# Patient Record
Sex: FEMALE | Race: WHITE | NOT HISPANIC OR LATINO
[De-identification: names, ages, dates, MRNs, and addresses within clinical notes are randomized per-mention and may not be internally consistent; named-entity substitution may affect disease eponyms.]

---

## 2017-01-03 ENCOUNTER — APPOINTMENT (OUTPATIENT)
Dept: PULMONOLOGY | Facility: CLINIC | Age: 72
End: 2017-01-03

## 2017-01-03 VITALS
WEIGHT: 218 LBS | OXYGEN SATURATION: 94 % | BODY MASS INDEX: 39.87 KG/M2 | SYSTOLIC BLOOD PRESSURE: 128 MMHG | DIASTOLIC BLOOD PRESSURE: 78 MMHG | HEART RATE: 76 BPM

## 2017-01-03 VITALS — RESPIRATION RATE: 16 BRPM

## 2017-04-04 ENCOUNTER — APPOINTMENT (OUTPATIENT)
Dept: PULMONOLOGY | Facility: CLINIC | Age: 72
End: 2017-04-04

## 2017-04-04 VITALS — DIASTOLIC BLOOD PRESSURE: 80 MMHG | OXYGEN SATURATION: 94 % | SYSTOLIC BLOOD PRESSURE: 128 MMHG | HEART RATE: 95 BPM

## 2017-04-04 VITALS — BODY MASS INDEX: 38.78 KG/M2 | WEIGHT: 212 LBS

## 2017-07-19 ENCOUNTER — APPOINTMENT (OUTPATIENT)
Dept: PULMONOLOGY | Facility: CLINIC | Age: 72
End: 2017-07-19

## 2017-07-19 VITALS — WEIGHT: 212 LBS | BODY MASS INDEX: 38.78 KG/M2

## 2017-07-19 VITALS — RESPIRATION RATE: 16 BRPM

## 2017-07-19 DIAGNOSIS — G47.34 IDIOPATHIC SLEEP RELATED NONOBSTRUCTIVE ALVEOLAR HYPOVENTILATION: ICD-10-CM

## 2017-10-25 ENCOUNTER — APPOINTMENT (OUTPATIENT)
Dept: PULMONOLOGY | Facility: CLINIC | Age: 72
End: 2017-10-25
Payer: MEDICARE

## 2017-10-25 ENCOUNTER — MED ADMIN CHARGE (OUTPATIENT)
Age: 72
End: 2017-10-25

## 2017-10-25 VITALS
WEIGHT: 221 LBS | BODY MASS INDEX: 40.42 KG/M2 | HEART RATE: 91 BPM | SYSTOLIC BLOOD PRESSURE: 118 MMHG | DIASTOLIC BLOOD PRESSURE: 60 MMHG | OXYGEN SATURATION: 92 %

## 2017-10-25 VITALS — RESPIRATION RATE: 16 BRPM

## 2017-10-25 DIAGNOSIS — Z23 ENCOUNTER FOR IMMUNIZATION: ICD-10-CM

## 2017-10-25 PROCEDURE — 90688 IIV4 VACCINE SPLT 0.5 ML IM: CPT

## 2017-10-25 PROCEDURE — 99214 OFFICE O/P EST MOD 30 MIN: CPT | Mod: 25

## 2017-10-25 PROCEDURE — G0008: CPT

## 2018-04-03 ENCOUNTER — APPOINTMENT (OUTPATIENT)
Dept: PULMONOLOGY | Facility: CLINIC | Age: 73
End: 2018-04-03
Payer: MEDICARE

## 2018-04-03 VITALS — BODY MASS INDEX: 40.42 KG/M2 | WEIGHT: 221 LBS | SYSTOLIC BLOOD PRESSURE: 144 MMHG | DIASTOLIC BLOOD PRESSURE: 82 MMHG

## 2018-04-03 VITALS — RESPIRATION RATE: 16 BRPM

## 2018-04-03 VITALS — HEART RATE: 67 BPM | OXYGEN SATURATION: 94 %

## 2018-04-03 PROCEDURE — 99214 OFFICE O/P EST MOD 30 MIN: CPT

## 2018-04-03 RX ORDER — UMECLIDINIUM BROMIDE AND VILANTEROL TRIFENATATE 62.5; 25 UG/1; UG/1
62.5-25 POWDER RESPIRATORY (INHALATION) DAILY
Qty: 1 | Refills: 5 | Status: DISCONTINUED | COMMUNITY
Start: 2017-04-04 | End: 2018-04-03

## 2018-04-03 RX ORDER — CLONAZEPAM 0.5 MG/1
0.5 TABLET ORAL
Refills: 0 | Status: ACTIVE | COMMUNITY

## 2018-04-03 RX ORDER — ALBUTEROL SULFATE 90 UG/1
108 (90 BASE) AEROSOL, METERED RESPIRATORY (INHALATION)
Qty: 1 | Refills: 4 | Status: DISCONTINUED | COMMUNITY
Start: 2017-07-19 | End: 2018-04-03

## 2018-06-08 ENCOUNTER — APPOINTMENT (OUTPATIENT)
Dept: PULMONOLOGY | Facility: CLINIC | Age: 73
End: 2018-06-08
Payer: MEDICARE

## 2018-06-08 VITALS — BODY MASS INDEX: 38.78 KG/M2 | WEIGHT: 212 LBS

## 2018-06-08 VITALS
DIASTOLIC BLOOD PRESSURE: 80 MMHG | HEART RATE: 74 BPM | SYSTOLIC BLOOD PRESSURE: 130 MMHG | OXYGEN SATURATION: 95 % | HEIGHT: 62 IN | BODY MASS INDEX: 38.78 KG/M2

## 2018-06-08 VITALS — RESPIRATION RATE: 16 BRPM

## 2018-06-08 PROCEDURE — 94664 DEMO&/EVAL PT USE INHALER: CPT | Mod: 59

## 2018-06-08 PROCEDURE — 94060 EVALUATION OF WHEEZING: CPT

## 2018-06-08 PROCEDURE — 99214 OFFICE O/P EST MOD 30 MIN: CPT | Mod: 25

## 2018-06-08 RX ORDER — METFORMIN ER 500 MG 500 MG/1
500 TABLET ORAL
Qty: 90 | Refills: 0 | Status: ACTIVE | COMMUNITY
Start: 2018-05-17

## 2018-06-08 RX ORDER — ATORVASTATIN CALCIUM 40 MG/1
40 TABLET, FILM COATED ORAL
Qty: 90 | Refills: 0 | Status: ACTIVE | COMMUNITY
Start: 2018-02-21

## 2018-06-12 ENCOUNTER — APPOINTMENT (OUTPATIENT)
Dept: PULMONOLOGY | Facility: CLINIC | Age: 73
End: 2018-06-12

## 2018-07-05 ENCOUNTER — APPOINTMENT (OUTPATIENT)
Dept: PULMONOLOGY | Facility: CLINIC | Age: 73
End: 2018-07-05

## 2018-07-05 RX ORDER — PREDNISOLONE ACETATE 10 MG/ML
1 SUSPENSION/ DROPS OPHTHALMIC
Qty: 5 | Refills: 0 | Status: DISCONTINUED | COMMUNITY
Start: 2018-05-21 | End: 2018-07-05

## 2018-07-05 RX ORDER — OFLOXACIN 3 MG/ML
0.3 SOLUTION/ DROPS OPHTHALMIC
Qty: 5 | Refills: 0 | Status: DISCONTINUED | COMMUNITY
Start: 2018-05-21 | End: 2018-07-05

## 2018-07-05 RX ORDER — BROMFENAC 0.76 MG/ML
0.07 SOLUTION/ DROPS OPHTHALMIC
Qty: 5 | Refills: 0 | Status: DISCONTINUED | COMMUNITY
Start: 2018-05-21 | End: 2018-07-05

## 2018-10-22 ENCOUNTER — APPOINTMENT (OUTPATIENT)
Dept: PULMONOLOGY | Facility: CLINIC | Age: 73
End: 2018-10-22
Payer: MEDICARE

## 2018-10-22 VITALS — BODY MASS INDEX: 39.14 KG/M2 | WEIGHT: 214 LBS

## 2018-10-22 VITALS — DIASTOLIC BLOOD PRESSURE: 80 MMHG | OXYGEN SATURATION: 98 % | SYSTOLIC BLOOD PRESSURE: 130 MMHG | HEART RATE: 70 BPM

## 2018-10-22 VITALS — RESPIRATION RATE: 16 BRPM

## 2018-10-22 PROCEDURE — 99214 OFFICE O/P EST MOD 30 MIN: CPT | Mod: 25

## 2018-10-22 PROCEDURE — 94010 BREATHING CAPACITY TEST: CPT

## 2019-04-25 ENCOUNTER — APPOINTMENT (OUTPATIENT)
Dept: PULMONOLOGY | Facility: CLINIC | Age: 74
End: 2019-04-25

## 2019-07-11 ENCOUNTER — APPOINTMENT (OUTPATIENT)
Dept: PULMONOLOGY | Facility: CLINIC | Age: 74
End: 2019-07-11
Payer: MEDICARE

## 2019-07-11 VITALS
BODY MASS INDEX: 37.68 KG/M2 | RESPIRATION RATE: 16 BRPM | HEART RATE: 81 BPM | WEIGHT: 206 LBS | OXYGEN SATURATION: 95 % | SYSTOLIC BLOOD PRESSURE: 128 MMHG | DIASTOLIC BLOOD PRESSURE: 80 MMHG

## 2019-07-11 PROCEDURE — 99214 OFFICE O/P EST MOD 30 MIN: CPT

## 2019-07-11 NOTE — REVIEW OF SYSTEMS
[Back Pain] : ~T back pain [Hypertension] : ~T hypertension [Diabetes] : diabetes mellitus [As Noted in HPI] : as noted in HPI [Negative] : Psychiatric

## 2019-10-14 NOTE — PHYSICAL EXAM
[Normal Conjunctiva] : the conjunctiva exhibited no abnormalities [Eyelids - No Xanthelasma] : the eyelids demonstrated no xanthelasmas [Low Lying Soft Palate] : low lying soft palate [Enlarged Base of the Tongue] : enlargement of the base of the tongue [Elongated Uvula] : elongated uvula [III] : III [Neck Appearance] : the appearance of the neck was normal [Neck Cervical Mass (___cm)] : no neck mass was observed [Jugular Venous Distention Increased] : there was no jugular-venous distention [Thyroid Diffuse Enlargement] : the thyroid was not enlarged [Thyroid Nodule] : there were no palpable thyroid nodules [Heart Rate And Rhythm] : heart rate and rhythm were normal [Heart Sounds] : normal S1 and S2 [Murmurs] : no murmurs present [Abdomen Soft] : soft [Abdomen Tenderness] : non-tender [Abdomen Mass (___ Cm)] : no abdominal mass palpated [Abnormal Walk] : normal gait [Gait - Sufficient For Exercise Testing] : the gait was sufficient for exercise testing [Nail Clubbing] : no clubbing of the fingernails [Cyanosis, Localized] : no localized cyanosis [Petechial Hemorrhages (___cm)] : no petechial hemorrhages [Deep Tendon Reflexes (DTR)] : deep tendon reflexes were 2+ and symmetric [Sensation] : the sensory exam was normal to light touch and pinprick [No Focal Deficits] : no focal deficits [Oriented To Time, Place, And Person] : oriented to person, place, and time [Impaired Insight] : insight and judgment were intact [Affect] : the affect was normal [Normal Rate] : the respiratory rate was normal [Normal Rhythm/Effort] : normal respiratory rhythm and effort [Decreased Breath Sounds] : breath sounds were decreased diffusely [Normal to Percussion] : the lungs were normal to percussion [Normal] : palpation of the chest was normal [Skin Color & Pigmentation] : normal skin color and pigmentation [Skin Turgor] : normal skin turgor [] : no rash [FreeTextEntry1] : morbidly obese

## 2019-10-14 NOTE — ASSESSMENT
[FreeTextEntry1] : Mild COPD mild sleep apnea. CPAP use encouraged. Anoro was prescribed. Followup in 6 months with spirometry. We will probably repeat sleep studies at that time to reassess her sleep apnea and need for CPAP

## 2019-10-14 NOTE — HISTORY OF PRESENT ILLNESS
[FreeTextEntry1] : Patient with mild sleep apnea and mild COPD. Continues to display poor insight. She wants to go back on Anoro. she has not been using her CPAP.

## 2019-12-28 ENCOUNTER — OUTPATIENT (OUTPATIENT)
Dept: OUTPATIENT SERVICES | Facility: HOSPITAL | Age: 74
LOS: 1 days | End: 2019-12-28
Payer: MEDICARE

## 2019-12-28 DIAGNOSIS — G47.33 OBSTRUCTIVE SLEEP APNEA (ADULT) (PEDIATRIC): ICD-10-CM

## 2019-12-28 PROCEDURE — 95810 POLYSOM 6/> YRS 4/> PARAM: CPT

## 2019-12-28 PROCEDURE — 95810 POLYSOM 6/> YRS 4/> PARAM: CPT | Mod: 26

## 2020-01-14 ENCOUNTER — APPOINTMENT (OUTPATIENT)
Dept: PULMONOLOGY | Facility: CLINIC | Age: 75
End: 2020-01-14

## 2020-01-29 ENCOUNTER — APPOINTMENT (OUTPATIENT)
Dept: PULMONOLOGY | Facility: CLINIC | Age: 75
End: 2020-01-29
Payer: MEDICARE

## 2020-01-29 VITALS — RESPIRATION RATE: 16 BRPM

## 2020-01-29 VITALS — HEIGHT: 62 IN | WEIGHT: 211 LBS | BODY MASS INDEX: 38.83 KG/M2

## 2020-01-29 VITALS — OXYGEN SATURATION: 95 % | HEART RATE: 96 BPM | SYSTOLIC BLOOD PRESSURE: 130 MMHG | DIASTOLIC BLOOD PRESSURE: 68 MMHG

## 2020-01-29 PROCEDURE — 99214 OFFICE O/P EST MOD 30 MIN: CPT

## 2020-01-29 RX ORDER — UMECLIDINIUM BROMIDE AND VILANTEROL TRIFENATATE 62.5; 25 UG/1; UG/1
62.5-25 POWDER RESPIRATORY (INHALATION)
Qty: 1 | Refills: 2 | Status: DISCONTINUED | COMMUNITY
Start: 2018-06-08 | End: 2020-01-29

## 2020-01-29 NOTE — REVIEW OF SYSTEMS
[Hypertension] : ~T hypertension [Back Pain] : ~T back pain [Diabetes] : diabetes mellitus [As Noted in HPI] : as noted in HPI [Negative] : Pulmonary Hypertension

## 2020-01-29 NOTE — HISTORY OF PRESENT ILLNESS
[TextBox_4] : Not taking Anoro because of the cost. Feels reasonably well in terms of her breathing. She came in to review her recent sleep study. She is willing to try CPAP again. [TextBox_100] : 12/19 [TextBox_108] : 29 [TextBox_112] : 0 [TextBox_116] : 90

## 2020-01-29 NOTE — PHYSICAL EXAM
[FreeTextEntry1] : morbidly obese [Normal Conjunctiva] : the conjunctiva exhibited no abnormalities [Eyelids - No Xanthelasma] : the eyelids demonstrated no xanthelasmas [Elongated Uvula] : elongated uvula [Low Lying Soft Palate] : low lying soft palate [III] : III [Enlarged Base of the Tongue] : enlargement of the base of the tongue [Neck Appearance] : the appearance of the neck was normal [Neck Cervical Mass (___cm)] : no neck mass was observed [Thyroid Nodule] : there were no palpable thyroid nodules [Thyroid Diffuse Enlargement] : the thyroid was not enlarged [Jugular Venous Distention Increased] : there was no jugular-venous distention [Heart Rate And Rhythm] : heart rate and rhythm were normal [Heart Sounds] : normal S1 and S2 [Abdomen Soft] : soft [Murmurs] : no murmurs present [Abdomen Tenderness] : non-tender [Abdomen Mass (___ Cm)] : no abdominal mass palpated [Abnormal Walk] : normal gait [Nail Clubbing] : no clubbing of the fingernails [Gait - Sufficient For Exercise Testing] : the gait was sufficient for exercise testing [Petechial Hemorrhages (___cm)] : no petechial hemorrhages [Deep Tendon Reflexes (DTR)] : deep tendon reflexes were 2+ and symmetric [Cyanosis, Localized] : no localized cyanosis [No Focal Deficits] : no focal deficits [Sensation] : the sensory exam was normal to light touch and pinprick [Oriented To Time, Place, And Person] : oriented to person, place, and time [Impaired Insight] : insight and judgment were intact [Affect] : the affect was normal [Decreased Breath Sounds] : breath sounds were decreased diffusely [Normal Rhythm/Effort] : normal respiratory rhythm and effort [Normal Rate] : the respiratory rate was normal [Normal to Percussion] : the lungs were normal to percussion [Normal] : palpation of the chest was normal [Skin Color & Pigmentation] : normal skin color and pigmentation [Skin Turgor] : normal skin turgor [] : no rash

## 2020-01-29 NOTE — ASSESSMENT
[FreeTextEntry1] : Patient has moderate obstructive sleep apnea in need of treatment.AutoPAP will be ordered for the patient at 6 to 12 cm H2O.  The patient was instructed to begin wearing it with the goal being all night, every night.  Minimum acceptable use parameters were discussed with the patient.  Followup will be in 2-3 months to evaluate compliance and efficacy, and address any problems the patient may have with APAP.\par \par Patient has moderate COPD and does not wish treatment at this time. She is relatively asymptomatic.\par

## 2020-04-02 ENCOUNTER — APPOINTMENT (OUTPATIENT)
Dept: PULMONOLOGY | Facility: CLINIC | Age: 75
End: 2020-04-02

## 2020-04-02 NOTE — HISTORY OF PRESENT ILLNESS
[Home] : at home, [unfilled] , at the time of the visit. [Medical Office: ([facility])___] : at ~his/her~ medical office located in [place] [Obstructive Sleep Apnea] : obstructive sleep apnea [APAP:] : APAP [TextBox_4] : Denies SOB [TextBox_100] : 12/19 [TextBox_108] : 29 [TextBox_112] : 99 [TextBox_116] : 90 [TextBox_127] : 3/20 [TextBox_129] : 3/20 [TextBox_133] : 100 [TextBox_137] : 100 [TextBox_141] : 6 [TextBox_143] : 26 [TextBox_147] : 2.1 [TextBox_165] : Feels much more alert since on CPAP

## 2020-05-13 ENCOUNTER — APPOINTMENT (OUTPATIENT)
Dept: PULMONOLOGY | Facility: CLINIC | Age: 75
End: 2020-05-13

## 2020-11-02 ENCOUNTER — APPOINTMENT (OUTPATIENT)
Dept: PULMONOLOGY | Facility: CLINIC | Age: 75
End: 2020-11-02
Payer: MEDICARE

## 2020-11-02 VITALS
DIASTOLIC BLOOD PRESSURE: 80 MMHG | HEART RATE: 110 BPM | OXYGEN SATURATION: 94 % | SYSTOLIC BLOOD PRESSURE: 140 MMHG | RESPIRATION RATE: 20 BRPM | WEIGHT: 220 LBS | BODY MASS INDEX: 40.24 KG/M2

## 2020-11-02 PROCEDURE — 99214 OFFICE O/P EST MOD 30 MIN: CPT

## 2020-11-02 RX ORDER — DOXYCYCLINE HYCLATE 100 MG/1
100 TABLET ORAL
Qty: 30 | Refills: 0 | Status: DISCONTINUED | COMMUNITY
Start: 2020-09-15

## 2020-11-02 RX ORDER — ENALAPRIL MALEATE 5 MG/1
5 TABLET ORAL
Qty: 180 | Refills: 0 | Status: DISCONTINUED | COMMUNITY
Start: 2020-09-15

## 2020-11-02 NOTE — PHYSICAL EXAM
[FreeTextEntry1] : morbidly obese [Normal Conjunctiva] : the conjunctiva exhibited no abnormalities [Eyelids - No Xanthelasma] : the eyelids demonstrated no xanthelasmas [Low Lying Soft Palate] : low lying soft palate [Elongated Uvula] : elongated uvula [Enlarged Base of the Tongue] : enlargement of the base of the tongue [III] : III [Neck Appearance] : the appearance of the neck was normal [Neck Cervical Mass (___cm)] : no neck mass was observed [Jugular Venous Distention Increased] : there was no jugular-venous distention [Thyroid Diffuse Enlargement] : the thyroid was not enlarged [Thyroid Nodule] : there were no palpable thyroid nodules [Heart Rate And Rhythm] : heart rate and rhythm were normal [Heart Sounds] : normal S1 and S2 [Murmurs] : no murmurs present [Abdomen Soft] : soft [Abdomen Tenderness] : non-tender [Abdomen Mass (___ Cm)] : no abdominal mass palpated [Abnormal Walk] : normal gait [Gait - Sufficient For Exercise Testing] : the gait was sufficient for exercise testing [Nail Clubbing] : no clubbing of the fingernails [Cyanosis, Localized] : no localized cyanosis [Petechial Hemorrhages (___cm)] : no petechial hemorrhages [Deep Tendon Reflexes (DTR)] : deep tendon reflexes were 2+ and symmetric [Sensation] : the sensory exam was normal to light touch and pinprick [No Focal Deficits] : no focal deficits [Oriented To Time, Place, And Person] : oriented to person, place, and time [Impaired Insight] : insight and judgment were intact [Affect] : the affect was normal [Normal Rate] : the respiratory rate was normal [Normal Rhythm/Effort] : normal respiratory rhythm and effort [Decreased Breath Sounds] : breath sounds were decreased diffusely [Normal to Percussion] : the lungs were normal to percussion [Normal] : palpation of the chest was normal [Skin Color & Pigmentation] : normal skin color and pigmentation [Skin Turgor] : normal skin turgor [] : no rash

## 2020-11-02 NOTE — HISTORY OF PRESENT ILLNESS
[TextBox_4] : Patient remains alert on CPAP. She's been complaining of increasing shortness of breath lately. She does not have any inhalers currently. Of note is that she's gained about 9 pounds since last visit. [Obstructive Sleep Apnea] : obstructive sleep apnea [APAP:] : APAP [TextBox_100] : 12/19 [TextBox_108] : 29 [TextBox_112] : 99 [TextBox_116] : 90 [TextBox_127] : 9/20 [TextBox_129] : 10/20 [TextBox_133] : 100 [TextBox_137] : 100 [TextBox_141] : 6 [TextBox_143] : 37 [TextBox_147] : 1.4 [TextBox_165] : Feels much more alert since on CPAP

## 2020-11-02 NOTE — ASSESSMENT
[FreeTextEntry1] : Sleep apnea with excellent compliance and benefit from CPAP.\par \par Patient with mild to moderate COPD. Probably worse due to weight gain but she should be on some chronic medication. Anoro was restarted and I gave her samples. Followup will be in 3 months.

## 2020-11-02 NOTE — REASON FOR VISIT
[Follow-Up] : a follow-up visit [Sleep Apnea] : sleep apnea [COPD] : COPD [Shortness of Breath] : shortness of breath

## 2021-02-05 ENCOUNTER — APPOINTMENT (OUTPATIENT)
Dept: PULMONOLOGY | Facility: CLINIC | Age: 76
End: 2021-02-05

## 2021-03-09 ENCOUNTER — APPOINTMENT (OUTPATIENT)
Dept: PULMONOLOGY | Facility: CLINIC | Age: 76
End: 2021-03-09
Payer: MEDICARE

## 2021-03-09 VITALS
OXYGEN SATURATION: 96 % | HEIGHT: 65 IN | WEIGHT: 220 LBS | HEART RATE: 83 BPM | DIASTOLIC BLOOD PRESSURE: 70 MMHG | BODY MASS INDEX: 36.65 KG/M2 | SYSTOLIC BLOOD PRESSURE: 118 MMHG | RESPIRATION RATE: 15 BRPM

## 2021-03-09 PROCEDURE — 99214 OFFICE O/P EST MOD 30 MIN: CPT

## 2021-03-09 NOTE — HISTORY OF PRESENT ILLNESS
[TextBox_4] : Patient feels somewhat better with Anoro in terms of shortness of breath but feels that she could use a rescue inhaler. [Obstructive Sleep Apnea] : obstructive sleep apnea [APAP:] : APAP [TextBox_100] : 12/19 [TextBox_108] : 29 [TextBox_112] : 99 [TextBox_116] : 90 [TextBox_127] : 2/21 [TextBox_129] : 3/21 [TextBox_133] : 100 [TextBox_137] : 100 [TextBox_141] : 6 [TextBox_143] : 41 [TextBox_147] : 1.6 [TextBox_165] : Feels much more alert since on CPAP

## 2021-03-09 NOTE — ASSESSMENT
[FreeTextEntry1] : COPD doing better. I ordered a rescue inhaler for her but I told her that he should only be used if she feels wheezy or congested.\par \par Sleep apnea with excellent compliance and benefit from CPAP. CPAP will be continued.\par \par Followup in 6 months.

## 2021-06-24 ENCOUNTER — RX RENEWAL (OUTPATIENT)
Age: 76
End: 2021-06-24

## 2021-09-09 ENCOUNTER — APPOINTMENT (OUTPATIENT)
Dept: PULMONOLOGY | Facility: CLINIC | Age: 76
End: 2021-09-09
Payer: MEDICARE

## 2021-09-09 VITALS
HEART RATE: 72 BPM | RESPIRATION RATE: 16 BRPM | OXYGEN SATURATION: 96 % | DIASTOLIC BLOOD PRESSURE: 78 MMHG | WEIGHT: 222 LBS | SYSTOLIC BLOOD PRESSURE: 122 MMHG | BODY MASS INDEX: 36.94 KG/M2

## 2021-09-09 PROCEDURE — 99214 OFFICE O/P EST MOD 30 MIN: CPT

## 2021-09-09 NOTE — HISTORY OF PRESENT ILLNESS
[TextBox_4] : Breathing better since going on Anoro. [Obstructive Sleep Apnea] : obstructive sleep apnea [APAP:] : APAP [TextBox_100] : 12/19 [TextBox_108] : 29 [TextBox_112] : 99 [TextBox_116] : 90 [TextBox_127] : 8/21 [TextBox_129] : 9/21 [TextBox_133] : 100 [TextBox_137] : 100 [TextBox_141] : 6 [TextBox_143] : 46 [TextBox_147] : 1.6 [TextBox_165] : Feels much more alert since on CPAP

## 2021-09-09 NOTE — PHYSICAL EXAM
[Normal Conjunctiva] : the conjunctiva exhibited no abnormalities [FreeTextEntry1] : morbidly obese [Eyelids - No Xanthelasma] : the eyelids demonstrated no xanthelasmas [Low Lying Soft Palate] : low lying soft palate [Elongated Uvula] : elongated uvula [Enlarged Base of the Tongue] : enlargement of the base of the tongue [III] : III [Neck Appearance] : the appearance of the neck was normal [Neck Cervical Mass (___cm)] : no neck mass was observed [Jugular Venous Distention Increased] : there was no jugular-venous distention [Thyroid Diffuse Enlargement] : the thyroid was not enlarged [Thyroid Nodule] : there were no palpable thyroid nodules [Heart Rate And Rhythm] : heart rate and rhythm were normal [Heart Sounds] : normal S1 and S2 [Murmurs] : no murmurs present [Abdomen Soft] : soft [Abdomen Tenderness] : non-tender [Abdomen Mass (___ Cm)] : no abdominal mass palpated [Abnormal Walk] : normal gait [Gait - Sufficient For Exercise Testing] : the gait was sufficient for exercise testing [Nail Clubbing] : no clubbing of the fingernails [Cyanosis, Localized] : no localized cyanosis [Petechial Hemorrhages (___cm)] : no petechial hemorrhages [Deep Tendon Reflexes (DTR)] : deep tendon reflexes were 2+ and symmetric [Sensation] : the sensory exam was normal to light touch and pinprick [Oriented To Time, Place, And Person] : oriented to person, place, and time [No Focal Deficits] : no focal deficits [Impaired Insight] : insight and judgment were intact [Affect] : the affect was normal [Normal Rate] : the respiratory rate was normal [Normal Rhythm/Effort] : normal respiratory rhythm and effort [Decreased Breath Sounds] : breath sounds were decreased diffusely [Normal to Percussion] : the lungs were normal to percussion [Normal] : palpation of the chest was normal [Skin Color & Pigmentation] : normal skin color and pigmentation [Skin Turgor] : normal skin turgor [] : no rash

## 2022-03-08 ENCOUNTER — APPOINTMENT (OUTPATIENT)
Dept: PULMONOLOGY | Facility: CLINIC | Age: 77
End: 2022-03-08
Payer: MEDICARE

## 2022-03-08 VITALS — OXYGEN SATURATION: 96 % | DIASTOLIC BLOOD PRESSURE: 64 MMHG | SYSTOLIC BLOOD PRESSURE: 110 MMHG | HEART RATE: 92 BPM

## 2022-03-08 VITALS — BODY MASS INDEX: 37.11 KG/M2 | WEIGHT: 223 LBS

## 2022-03-08 PROCEDURE — 99214 OFFICE O/P EST MOD 30 MIN: CPT

## 2022-03-08 RX ORDER — PANTOPRAZOLE 40 MG/1
40 TABLET, DELAYED RELEASE ORAL
Qty: 30 | Refills: 0 | Status: DISCONTINUED | COMMUNITY
Start: 2021-09-01 | End: 2022-03-08

## 2022-03-08 RX ORDER — PANTOPRAZOLE 20 MG/1
20 TABLET, DELAYED RELEASE ORAL
Refills: 0 | Status: DISCONTINUED | COMMUNITY
End: 2022-03-08

## 2022-03-08 NOTE — PHYSICAL EXAM
[FreeTextEntry1] : morbidly obese [Normal Conjunctiva] : the conjunctiva exhibited no abnormalities [Eyelids - No Xanthelasma] : the eyelids demonstrated no xanthelasmas [Low Lying Soft Palate] : low lying soft palate [Elongated Uvula] : elongated uvula [Enlarged Base of the Tongue] : enlargement of the base of the tongue [III] : III [Neck Appearance] : the appearance of the neck was normal [Neck Cervical Mass (___cm)] : no neck mass was observed [Jugular Venous Distention Increased] : there was no jugular-venous distention [Thyroid Diffuse Enlargement] : the thyroid was not enlarged [Thyroid Nodule] : there were no palpable thyroid nodules [Heart Rate And Rhythm] : heart rate and rhythm were normal [Heart Sounds] : normal S1 and S2 [Murmurs] : no murmurs present [Abdomen Soft] : soft [Abdomen Tenderness] : non-tender [Abdomen Mass (___ Cm)] : no abdominal mass palpated [Abnormal Walk] : normal gait [Gait - Sufficient For Exercise Testing] : the gait was sufficient for exercise testing [Cyanosis, Localized] : no localized cyanosis [Nail Clubbing] : no clubbing of the fingernails [Petechial Hemorrhages (___cm)] : no petechial hemorrhages [Deep Tendon Reflexes (DTR)] : deep tendon reflexes were 2+ and symmetric [Sensation] : the sensory exam was normal to light touch and pinprick [No Focal Deficits] : no focal deficits [Oriented To Time, Place, And Person] : oriented to person, place, and time [Impaired Insight] : insight and judgment were intact [Affect] : the affect was normal [Normal Rate] : the respiratory rate was normal [Normal Rhythm/Effort] : normal respiratory rhythm and effort [Decreased Breath Sounds] : breath sounds were decreased diffusely [Normal to Percussion] : the lungs were normal to percussion [Normal] : palpation of the chest was normal [Skin Color & Pigmentation] : normal skin color and pigmentation [Skin Turgor] : normal skin turgor [] : no rash

## 2022-03-08 NOTE — ASSESSMENT
[FreeTextEntry1] : Severe sleep apnea doing well on CPAP. Some increased shortness of breath with her COPD. We will recheck pulmonary function testing on her and I will see her back in 3 weeks to discuss it before we decide on any other medications.

## 2022-03-08 NOTE — HISTORY OF PRESENT ILLNESS
[TextBox_4] : Patient complains of some increased shortness of breath with exertion. Not associated with cough and wheezing but does improve with resting. Remains compliant with Anoro. Weight is stable. [Obstructive Sleep Apnea] : obstructive sleep apnea [APAP:] : APAP [TextBox_100] : 12/19 [TextBox_108] : 29 [TextBox_112] : 99 [TextBox_116] : 90 [TextBox_125] : 6-12 [TextBox_127] : 2/22 [TextBox_129] : 3/22 [TextBox_133] : 100 [TextBox_137] : 100 [TextBox_141] : 6 [TextBox_143] : 56 [TextBox_147] : 1.6 [TextBox_165] : Feels much more alert since on CPAP

## 2022-04-06 ENCOUNTER — APPOINTMENT (OUTPATIENT)
Dept: PULMONOLOGY | Facility: CLINIC | Age: 77
End: 2022-04-06
Payer: MEDICARE

## 2022-04-06 VITALS
RESPIRATION RATE: 16 BRPM | OXYGEN SATURATION: 96 % | DIASTOLIC BLOOD PRESSURE: 78 MMHG | HEART RATE: 125 BPM | SYSTOLIC BLOOD PRESSURE: 124 MMHG

## 2022-04-06 VITALS — BODY MASS INDEX: 42.67 KG/M2 | HEIGHT: 61 IN | WEIGHT: 226 LBS

## 2022-04-06 PROCEDURE — 94729 DIFFUSING CAPACITY: CPT

## 2022-04-06 PROCEDURE — 94727 GAS DIL/WSHOT DETER LNG VOL: CPT

## 2022-04-06 PROCEDURE — 99214 OFFICE O/P EST MOD 30 MIN: CPT | Mod: 25

## 2022-04-06 PROCEDURE — 85018 HEMOGLOBIN: CPT | Mod: QW

## 2022-04-06 PROCEDURE — 94010 BREATHING CAPACITY TEST: CPT

## 2022-04-06 NOTE — PROCEDURE
[FreeTextEntry1] : Pulmonary function studies show no change in FEV1 since 2018. There is a mild drop in vital capacity consistent with 20 pounds weight gain. Obesity related volume changes were noted.

## 2022-04-06 NOTE — HISTORY OF PRESENT ILLNESS
[TextBox_4] : The patient came in for pulmonary function studies today. She remains fully compliant with CPAP and Anoro.

## 2022-04-06 NOTE — ASSESSMENT
[FreeTextEntry1] : Patient's increased shortness of breath is on the basis of weight gain. Her COPD appears to be stable. No additional medications are needed at this time.\par \par Severe obstructive sleep apnea with excellent compliance and benefit from CPAP.\par \par Followup in one year.

## 2022-11-14 ENCOUNTER — APPOINTMENT (OUTPATIENT)
Dept: PULMONOLOGY | Facility: CLINIC | Age: 77
End: 2022-11-14

## 2022-11-14 VITALS
WEIGHT: 225 LBS | OXYGEN SATURATION: 97 % | BODY MASS INDEX: 42.48 KG/M2 | HEIGHT: 61 IN | RESPIRATION RATE: 16 BRPM | SYSTOLIC BLOOD PRESSURE: 124 MMHG | DIASTOLIC BLOOD PRESSURE: 72 MMHG | HEART RATE: 127 BPM

## 2022-11-14 PROCEDURE — G0296 VISIT TO DETERM LDCT ELIG: CPT | Mod: 59

## 2022-11-14 PROCEDURE — 99214 OFFICE O/P EST MOD 30 MIN: CPT | Mod: 25

## 2022-11-14 NOTE — ASSESSMENT
[FreeTextEntry1] : Patient with COPD compounded by morbid obesity.  Shortness of breath has a large component of deconditioning with restriction from obesity.  We will continue Anoro with albuterol as needed.  I have asked her to enroll in pulmonary rehabilitation and started that paperwork.\par \par Lung cancer screening CT scan.\par Moderate to severe sleep apnea with excellent compliance and benefit from CPAP.\par \par Follow-up in 3 months otherwise.

## 2022-11-14 NOTE — HISTORY OF PRESENT ILLNESS
[TextBox_4] : The patient remains fully compliant with CPAP.  She reports ongoing dyspnea on exertion.  She lost few pounds but then gained them back.  Denies cough or wheeze.  Patient is a former smoker having quit 10 years ago.

## 2022-11-21 ENCOUNTER — NON-APPOINTMENT (OUTPATIENT)
Age: 77
End: 2022-11-21

## 2022-11-21 VITALS — WEIGHT: 225 LBS | HEIGHT: 61 IN | BODY MASS INDEX: 42.48 KG/M2

## 2022-11-21 DIAGNOSIS — Z87.891 PERSONAL HISTORY OF NICOTINE DEPENDENCE: ICD-10-CM

## 2022-11-21 NOTE — HISTORY OF PRESENT ILLNESS
[Former] : Former [TextBox_13] : Referred by Dr. Kaley Alvarenga.\par \par Ms. SANDHU is a 77 year old female with a history of HTN, high cholesterol and COPD.\par \par She was called to review eligibility for Low-Dose CT lung cancer screening.  Reviewed and confirmed that the patient meets screening eligibility criteria:\par \par 77 years old \par \par Smoking Status: Former smoker \par \par Number of pack(s) per day: 1\par Number of years smoked: 30 \par Number of pack years: 30\par \par Number of years since quitting smokin\par Quit year: \par \par Ms. SANDHU denies any symptoms of lung cancer, including new cough, change in cough, hemoptysis, and unintentional weight loss.\par \par Ms. SANDHU denies any personal history of lung cancer.  No lung cancer in a first degree relative.  Denies any history of occupational exposures. [YearQuit] : 2010 [PacksperDay] : 1 [N_Years] : 30 [PacksperYear] : 30

## 2022-11-25 ENCOUNTER — APPOINTMENT (OUTPATIENT)
Dept: CT IMAGING | Facility: CLINIC | Age: 77
End: 2022-11-25

## 2022-11-25 ENCOUNTER — OUTPATIENT (OUTPATIENT)
Dept: OUTPATIENT SERVICES | Facility: HOSPITAL | Age: 77
LOS: 1 days | End: 2022-11-25
Payer: MEDICARE

## 2022-11-25 DIAGNOSIS — J44.9 CHRONIC OBSTRUCTIVE PULMONARY DISEASE, UNSPECIFIED: ICD-10-CM

## 2022-11-25 PROCEDURE — 71271 CT THORAX LUNG CANCER SCR C-: CPT | Mod: 26

## 2022-11-25 PROCEDURE — 71271 CT THORAX LUNG CANCER SCR C-: CPT

## 2023-01-11 ENCOUNTER — OFFICE (OUTPATIENT)
Dept: URBAN - METROPOLITAN AREA CLINIC 12 | Facility: CLINIC | Age: 78
Setting detail: OPHTHALMOLOGY
End: 2023-01-11
Payer: MEDICARE

## 2023-01-11 DIAGNOSIS — H43.393: ICD-10-CM

## 2023-01-11 DIAGNOSIS — Z96.1: ICD-10-CM

## 2023-01-11 DIAGNOSIS — E11.9: ICD-10-CM

## 2023-01-11 DIAGNOSIS — D31.31: ICD-10-CM

## 2023-01-11 DIAGNOSIS — H43.812: ICD-10-CM

## 2023-01-11 DIAGNOSIS — H40.033: ICD-10-CM

## 2023-01-11 PROCEDURE — 92250 FUNDUS PHOTOGRAPHY W/I&R: CPT | Performed by: OPTOMETRIST

## 2023-01-11 PROCEDURE — 92014 COMPRE OPH EXAM EST PT 1/>: CPT | Performed by: OPTOMETRIST

## 2023-01-11 ASSESSMENT — REFRACTION_MANIFEST
OD_CYLINDER: -0.50
OD_AXIS: 100
OS_AXIS: 65
OS_VA1: 20/30
OS_SPHERE: +2.25
OU_VA: 20/20
OD_SPHERE: +2.50
OD_VA1: 20/30
OS_CYLINDER: -0.50

## 2023-01-11 ASSESSMENT — TONOMETRY
OS_IOP_MMHG: 18
OD_IOP_MMHG: 20

## 2023-01-11 ASSESSMENT — SPHEQUIV_DERIVED
OS_SPHEQUIV: 2
OD_SPHEQUIV: 2.25
OS_SPHEQUIV: -0.125
OD_SPHEQUIV: -0.125

## 2023-01-11 ASSESSMENT — REFRACTION_CURRENTRX
OD_ADD: +2.75
OD_AXIS: 067
OS_CYLINDER: SPH
OD_OVR_VA: 20/
OS_VPRISM_DIRECTION: PROGS
OS_OVR_VA: 20/
OD_VPRISM_DIRECTION: PROGS
OS_ADD: +2.75
OS_SPHERE: +2.50
OD_CYLINDER: -0.25
OD_SPHERE: +2.50

## 2023-01-11 ASSESSMENT — VISUAL ACUITY
OD_BCVA: 20/40
OS_BCVA: 20/40-2

## 2023-01-11 ASSESSMENT — KERATOMETRY
OD_AXISANGLE_DEGREES: 035
OS_K2POWER_DIOPTERS: 44.75
OS_K1POWER_DIOPTERS: 44.25
OD_K1POWER_DIOPTERS: 44.25
METHOD_AUTO_MANUAL: AUTO
OS_AXISANGLE_DEGREES: 179
OD_K2POWER_DIOPTERS: 44.75

## 2023-01-11 ASSESSMENT — AXIALLENGTH_DERIVED
OD_AL: 23.2775
OS_AL: 23.2775
OD_AL: 22.4103
OS_AL: 22.4985

## 2023-01-11 ASSESSMENT — REFRACTION_AUTOREFRACTION
OD_SPHERE: +0.25
OD_AXIS: 114
OS_SPHERE: +0.25
OS_CYLINDER: -0.75
OS_AXIS: 090
OD_CYLINDER: -0.75

## 2023-01-11 ASSESSMENT — CONFRONTATIONAL VISUAL FIELD TEST (CVF)
OD_FINDINGS: FULL
OS_FINDINGS: FULL

## 2023-01-24 ENCOUNTER — OFFICE (OUTPATIENT)
Dept: URBAN - METROPOLITAN AREA CLINIC 12 | Facility: CLINIC | Age: 78
Setting detail: OPHTHALMOLOGY
End: 2023-01-24
Payer: MEDICARE

## 2023-01-24 VITALS — HEIGHT: 55 IN

## 2023-01-24 DIAGNOSIS — E11.9: ICD-10-CM

## 2023-01-24 DIAGNOSIS — H43.813: ICD-10-CM

## 2023-01-24 DIAGNOSIS — D31.31: ICD-10-CM

## 2023-01-24 DIAGNOSIS — H43.393: ICD-10-CM

## 2023-01-24 DIAGNOSIS — H40.033: ICD-10-CM

## 2023-01-24 DIAGNOSIS — Z96.1: ICD-10-CM

## 2023-01-24 PROCEDURE — 99213 OFFICE O/P EST LOW 20 MIN: CPT | Performed by: OPTOMETRIST

## 2023-01-24 ASSESSMENT — REFRACTION_CURRENTRX
OD_OVR_VA: 20/
OS_CYLINDER: SPH
OS_VPRISM_DIRECTION: PROGS
OD_VPRISM_DIRECTION: PROGS
OD_CYLINDER: -0.25
OS_OVR_VA: 20/
OS_ADD: +2.75
OD_AXIS: 067
OS_SPHERE: +2.50
OD_SPHERE: +2.50
OD_ADD: +2.75

## 2023-01-24 ASSESSMENT — TONOMETRY
OS_IOP_MMHG: 15
OD_IOP_MMHG: 17

## 2023-01-24 ASSESSMENT — AXIALLENGTH_DERIVED
OD_AL: 22.4518
OS_AL: 23.3278
OS_AL: 22.4568
OD_AL: 23.37

## 2023-01-24 ASSESSMENT — REFRACTION_MANIFEST
OD_CYLINDER: -0.50
OD_SPHERE: +2.50
OS_AXIS: 65
OS_SPHERE: +2.25
OD_VA1: 20/30
OD_AXIS: 100
OU_VA: 20/20
OS_VA1: 20/30
OS_CYLINDER: -0.50

## 2023-01-24 ASSESSMENT — CONFRONTATIONAL VISUAL FIELD TEST (CVF)
OD_FINDINGS: FULL
OS_FINDINGS: FULL

## 2023-01-24 ASSESSMENT — REFRACTION_AUTOREFRACTION
OD_AXIS: 119
OS_SPHERE: -0.25
OD_SPHERE: +0.25
OS_AXIS: 089
OD_CYLINDER: -1.00
OS_CYLINDER: -0.25

## 2023-01-24 ASSESSMENT — KERATOMETRY
OS_K1POWER_DIOPTERS: 44.50
OD_K1POWER_DIOPTERS: 44.00
METHOD_AUTO_MANUAL: AUTO
OS_K2POWER_DIOPTERS: 44.75
OD_K2POWER_DIOPTERS: 44.75
OS_AXISANGLE_DEGREES: 005
OD_AXISANGLE_DEGREES: 043

## 2023-01-24 ASSESSMENT — SPHEQUIV_DERIVED
OS_SPHEQUIV: -0.375
OS_SPHEQUIV: 2
OD_SPHEQUIV: 2.25
OD_SPHEQUIV: -0.25

## 2023-01-24 ASSESSMENT — VISUAL ACUITY
OS_BCVA: 20/40
OD_BCVA: 20/30-2

## 2023-01-25 ENCOUNTER — APPOINTMENT (OUTPATIENT)
Dept: PULMONOLOGY | Facility: CLINIC | Age: 78
End: 2023-01-25

## 2023-01-31 ENCOUNTER — APPOINTMENT (OUTPATIENT)
Dept: PULMONOLOGY | Facility: CLINIC | Age: 78
End: 2023-01-31

## 2023-02-02 ENCOUNTER — APPOINTMENT (OUTPATIENT)
Dept: PULMONOLOGY | Facility: CLINIC | Age: 78
End: 2023-02-02

## 2023-02-07 ENCOUNTER — APPOINTMENT (OUTPATIENT)
Dept: PULMONOLOGY | Facility: CLINIC | Age: 78
End: 2023-02-07

## 2023-02-09 ENCOUNTER — APPOINTMENT (OUTPATIENT)
Dept: PULMONOLOGY | Facility: CLINIC | Age: 78
End: 2023-02-09

## 2023-02-14 ENCOUNTER — APPOINTMENT (OUTPATIENT)
Dept: PULMONOLOGY | Facility: CLINIC | Age: 78
End: 2023-02-14

## 2023-02-14 ENCOUNTER — APPOINTMENT (OUTPATIENT)
Dept: PULMONOLOGY | Facility: CLINIC | Age: 78
End: 2023-02-14
Payer: MEDICARE

## 2023-02-14 VITALS
WEIGHT: 221 LBS | HEART RATE: 108 BPM | OXYGEN SATURATION: 97 % | BODY MASS INDEX: 41.76 KG/M2 | DIASTOLIC BLOOD PRESSURE: 70 MMHG | SYSTOLIC BLOOD PRESSURE: 112 MMHG

## 2023-02-14 PROCEDURE — 99214 OFFICE O/P EST MOD 30 MIN: CPT

## 2023-02-14 RX ORDER — RIVAROXABAN 20 MG/1
20 TABLET, FILM COATED ORAL
Refills: 0 | Status: ACTIVE | COMMUNITY

## 2023-02-14 RX ORDER — DILTIAZEM HYDROCHLORIDE 180 MG/1
180 CAPSULE, COATED, EXTENDED RELEASE ORAL
Refills: 0 | Status: ACTIVE | COMMUNITY

## 2023-02-14 NOTE — HISTORY OF PRESENT ILLNESS
[TextBox_4] : Patient had significant tachycardia during 6-minute walk testing for pulmonary rehabilitation and is now undergoing cardiac evaluation.  Shortness of breath is at baseline.  Remains fully compliant with CPAP.

## 2023-02-14 NOTE — ASSESSMENT
[FreeTextEntry1] : COPD awaiting pulmonary rehabilitation.  Continue current medications and complete cardiac evaluation.\par \par Severe obstructive sleep apnea with excellent compliance and benefit from CPAP.\par \par Abnormalities seen on lung cancer screening CT scan.  Repeat is recommended for 6 months.  This has been scheduled for May.  I will see her back here in 4 months.

## 2023-02-16 ENCOUNTER — APPOINTMENT (OUTPATIENT)
Dept: PULMONOLOGY | Facility: CLINIC | Age: 78
End: 2023-02-16

## 2023-02-21 ENCOUNTER — APPOINTMENT (OUTPATIENT)
Dept: PULMONOLOGY | Facility: CLINIC | Age: 78
End: 2023-02-21

## 2023-02-23 ENCOUNTER — APPOINTMENT (OUTPATIENT)
Dept: PULMONOLOGY | Facility: CLINIC | Age: 78
End: 2023-02-23

## 2023-02-28 ENCOUNTER — APPOINTMENT (OUTPATIENT)
Dept: PULMONOLOGY | Facility: CLINIC | Age: 78
End: 2023-02-28

## 2023-03-02 ENCOUNTER — APPOINTMENT (OUTPATIENT)
Dept: PULMONOLOGY | Facility: CLINIC | Age: 78
End: 2023-03-02

## 2023-03-07 ENCOUNTER — APPOINTMENT (OUTPATIENT)
Dept: PULMONOLOGY | Facility: CLINIC | Age: 78
End: 2023-03-07

## 2023-03-09 ENCOUNTER — APPOINTMENT (OUTPATIENT)
Dept: PULMONOLOGY | Facility: CLINIC | Age: 78
End: 2023-03-09

## 2023-03-14 ENCOUNTER — APPOINTMENT (OUTPATIENT)
Dept: PULMONOLOGY | Facility: CLINIC | Age: 78
End: 2023-03-14

## 2023-03-16 ENCOUNTER — APPOINTMENT (OUTPATIENT)
Dept: PULMONOLOGY | Facility: CLINIC | Age: 78
End: 2023-03-16

## 2023-03-21 ENCOUNTER — APPOINTMENT (OUTPATIENT)
Dept: PULMONOLOGY | Facility: CLINIC | Age: 78
End: 2023-03-21

## 2023-03-23 ENCOUNTER — APPOINTMENT (OUTPATIENT)
Dept: PULMONOLOGY | Facility: CLINIC | Age: 78
End: 2023-03-23

## 2023-03-27 NOTE — H&P PST ADULT - NSICDXPASTMEDICALHX_GEN_ALL_CORE_FT
PAST MEDICAL HISTORY:  COPD (chronic obstructive pulmonary disease)     Diabetes     Former smoker     Hyperlipemia     Hypertension     Permanent atrial fibrillation

## 2023-03-27 NOTE — H&P PST ADULT - ASSESSMENT
Indication: 77 year old female with c/o MURCIA/SOB with abnormal stress test.     Risk Stratification:  ASA:  Mallampati:  Bleeding Risk:  Creatinine:  GFR:    Coronary anatomy: unknown    Plan/Recommendations:   -plan for [facility]  -preferred access: [facility]   -patient seen and examined  -confirmed appropriate NPO duration  -ECG and Labs reviewed  -Aspirin 81mg po pre-cath***  -NS 250mL IV bolus pre-cath***  -procedure discussed with patient; risks and benefits explained, questions answered  -consent obtained by attending IC     Indication: 77 year old female with c/o MURCIA/SOB with abnormal stress test.     Risk Stratification:  ASA: 3  Mallampati: 2  Bleeding Risk: 1.7%  Creatinine: 0.79  GFR: 78    Coronary anatomy: unknown    Plan/Recommendations:   -plan for [facility]  -preferred access: [facility]RA   -patient seen and examined  -confirmed appropriate NPO duration  -ECG and Labs reviewed  -Aspirin 81mg po pre-cath  -NS 250mL IV bolus pre-cath  -procedure discussed with patient; risks and benefits explained, questions answered  -consent obtained by attending IC

## 2023-03-27 NOTE — H&P PST ADULT - HISTORY OF PRESENT ILLNESS
Department of Cardiology                                                                  [facility]/[place] [address] [address]                                                            Telephone: [phone]. Fax:[phone]                                                                             Pre- Cardiac Procedure H + P      HPI:  77 year old female h/o HTN, HLD, DM, COPD, prior smoker and Afib on xarelto who c/o SOB and MURCIA.  NST revealed small sized, mild to mod severity, apical and distal inferior, partially reversible defect.  For [facility] to assess coronary anatomy.      Symptoms:        Angina (Class):        Ischemic Symptoms: MURCIA, SOB    Heart Failure:        Systolic/Diastolic/Combined: NA       NYHA Class (within 2 weeks):     Assessment of LVEF:       EF: 55-60%       Assessed by: echo       Date: 2/28/23    Prior Cardiac Interventions: NA         Noninvasive Testing:   Stress Test: Date: 2/28/23       Myocardial Imaging: small sized, mild to mod severity, apical and distal inferior, partially reversible defect.       Risk Assessment (Low, Medium, High): Medium    Echo:   mild to mod MR  tild TR      Associated Risk Factors:        Frailty Screening: (N/A, mild, mod, severe)       Cerebrovascular Disease: N/A       Chronic Lung Disease: N/A       Peripheral Arterial Disease: N/A       Chronic Kidney Disease (if yes, what is GFR): N/A       Uncontrolled Diabetes (if yes, what is HgbA1C or FBS): N/A       Poorly Controlled Hypertension (if yes, what is SBP): N/A       Morbid Obesity (if yes, what is BMI): N/A       History of Recent Ventricular Arrhythmia: N/A       Inability to Ambulate Safely: N/A       Need for Therapeutic Anticoagulation: N/A       Antiplatelet or Contrast Allergy: N/A    Antianginal Therapies:        Beta Blockers:         Calcium Channel Blockers: y       Long Acting Nitrates:        Ranexa:     	  ROS:     PHYSICAL EXAM:        TELEMETRY: 	      ECG:  	    LABS:	 	                                                                                                         Department of Cardiology                                                                  [facility]/[place] [address] [address]                                                            Telephone: [phone]. Fax:[phone]                                                                             Pre- Cardiac Procedure H + P      HPI:  77 year old female h/o HTN, HLD, DM, COPD, prior smoker and Afib on xarelto (last dose 3/26) who c/o SOB and MURCIA.  NST revealed small sized, mild to mod severity, apical and distal inferior, partially reversible defect.  For [facility] to assess coronary anatomy.      Symptoms:        Angina (Class):        Ischemic Symptoms: MURCIA, SOB    Heart Failure:        Systolic/Diastolic/Combined: NA       NYHA Class (within 2 weeks):     Assessment of LVEF:       EF: 55-60%       Assessed by: echo       Date: 2/28/23    Prior Cardiac Interventions: NA         Noninvasive Testing:   Stress Test: Date: 2/28/23       Myocardial Imaging: small sized, mild to mod severity, apical and distal inferior, partially reversible defect.       Risk Assessment (Low, Medium, High): Medium    Echo:   mild to mod MR  mild TR      Associated Risk Factors:        Cerebrovascular Disease: N/A       Chronic Lung Disease: N/A       Peripheral Arterial Disease: N/A       Chronic Kidney Disease (if yes, what is GFR): N/A       Uncontrolled Diabetes (if yes, what is HgbA1C or FBS): N/A       Poorly Controlled Hypertension (if yes, what is SBP): N/A       Morbid Obesity (if yes, what is BMI): N/A       History of Recent Ventricular Arrhythmia: N/A       Inability to Ambulate Safely: N/A       Need for Therapeutic Anticoagulation: yes AF on xarelto       Antiplatelet or Contrast Allergy: N/A    Antianginal Therapies:        Beta Blockers:         Calcium Channel Blockers: y       Long Acting Nitrates:        Ranexa:     	  ROS: as stated above, otherwise negative    PHYSICAL EXAM:  Constitutional: A & O x 3, NAD  HEENT:  Normal oral mucosa, PERRL, EOMI	  Cardiovascular: irreg, No murmurs, No JVD  Respiratory: Lungs clear to auscultation	  Gastrointestinal:  Soft, Non-tender, + BS	  Skin: No rashes or cyanosis  Neurologic: No deficit appreciated  Extremities: Normal range of motion, + edema  Vascular: distal pulses +           TELEMETRY: AF (100-150)	      ECG:  	    LABS:

## 2023-03-28 ENCOUNTER — TRANSCRIPTION ENCOUNTER (OUTPATIENT)
Age: 78
End: 2023-03-28

## 2023-03-28 ENCOUNTER — OUTPATIENT (OUTPATIENT)
Dept: OUTPATIENT SERVICES | Facility: HOSPITAL | Age: 78
LOS: 1 days | End: 2023-03-28
Payer: MEDICARE

## 2023-03-28 ENCOUNTER — APPOINTMENT (OUTPATIENT)
Dept: PULMONOLOGY | Facility: CLINIC | Age: 78
End: 2023-03-28

## 2023-03-28 VITALS
SYSTOLIC BLOOD PRESSURE: 130 MMHG | HEART RATE: 99 BPM | RESPIRATION RATE: 22 BRPM | OXYGEN SATURATION: 98 % | DIASTOLIC BLOOD PRESSURE: 75 MMHG

## 2023-03-28 VITALS
HEART RATE: 84 BPM | SYSTOLIC BLOOD PRESSURE: 105 MMHG | DIASTOLIC BLOOD PRESSURE: 56 MMHG | OXYGEN SATURATION: 95 % | RESPIRATION RATE: 18 BRPM

## 2023-03-28 DIAGNOSIS — Z90.710 ACQUIRED ABSENCE OF BOTH CERVIX AND UTERUS: Chronic | ICD-10-CM

## 2023-03-28 DIAGNOSIS — Z98.890 OTHER SPECIFIED POSTPROCEDURAL STATES: Chronic | ICD-10-CM

## 2023-03-28 DIAGNOSIS — R94.39 ABNORMAL RESULT OF OTHER CARDIOVASCULAR FUNCTION STUDY: ICD-10-CM

## 2023-03-28 LAB
ANION GAP SERPL CALC-SCNC: 13 MMOL/L — SIGNIFICANT CHANGE UP (ref 5–17)
BUN SERPL-MCNC: 26.4 MG/DL — HIGH (ref 8–20)
CALCIUM SERPL-MCNC: 9.1 MG/DL — SIGNIFICANT CHANGE UP (ref 8.4–10.5)
CHLORIDE SERPL-SCNC: 99 MMOL/L — SIGNIFICANT CHANGE UP (ref 96–108)
CO2 SERPL-SCNC: 24 MMOL/L — SIGNIFICANT CHANGE UP (ref 22–29)
CREAT SERPL-MCNC: 0.79 MG/DL — SIGNIFICANT CHANGE UP (ref 0.5–1.3)
EGFR: 78 ML/MIN/1.73M2 — SIGNIFICANT CHANGE UP
GLUCOSE SERPL-MCNC: 136 MG/DL — HIGH (ref 70–99)
HCT VFR BLD CALC: 37.7 % — SIGNIFICANT CHANGE UP (ref 34.5–45)
HGB BLD-MCNC: 12.7 G/DL — SIGNIFICANT CHANGE UP (ref 11.5–15.5)
MAGNESIUM SERPL-MCNC: 2.1 MG/DL — SIGNIFICANT CHANGE UP (ref 1.6–2.6)
MCHC RBC-ENTMCNC: 28.7 PG — SIGNIFICANT CHANGE UP (ref 27–34)
MCHC RBC-ENTMCNC: 33.7 GM/DL — SIGNIFICANT CHANGE UP (ref 32–36)
MCV RBC AUTO: 85.1 FL — SIGNIFICANT CHANGE UP (ref 80–100)
PLATELET # BLD AUTO: 306 K/UL — SIGNIFICANT CHANGE UP (ref 150–400)
POTASSIUM SERPL-MCNC: 4.1 MMOL/L — SIGNIFICANT CHANGE UP (ref 3.5–5.3)
POTASSIUM SERPL-SCNC: 4.1 MMOL/L — SIGNIFICANT CHANGE UP (ref 3.5–5.3)
RBC # BLD: 4.43 M/UL — SIGNIFICANT CHANGE UP (ref 3.8–5.2)
RBC # FLD: 14.5 % — SIGNIFICANT CHANGE UP (ref 10.3–14.5)
SODIUM SERPL-SCNC: 136 MMOL/L — SIGNIFICANT CHANGE UP (ref 135–145)
WBC # BLD: 9.66 K/UL — SIGNIFICANT CHANGE UP (ref 3.8–10.5)
WBC # FLD AUTO: 9.66 K/UL — SIGNIFICANT CHANGE UP (ref 3.8–10.5)

## 2023-03-28 PROCEDURE — C1887: CPT

## 2023-03-28 PROCEDURE — 85027 COMPLETE CBC AUTOMATED: CPT

## 2023-03-28 PROCEDURE — 93010 ELECTROCARDIOGRAM REPORT: CPT

## 2023-03-28 PROCEDURE — 83735 ASSAY OF MAGNESIUM: CPT

## 2023-03-28 PROCEDURE — 36415 COLL VENOUS BLD VENIPUNCTURE: CPT

## 2023-03-28 PROCEDURE — 93458 L HRT ARTERY/VENTRICLE ANGIO: CPT

## 2023-03-28 PROCEDURE — 80048 BASIC METABOLIC PNL TOTAL CA: CPT

## 2023-03-28 PROCEDURE — C1894: CPT

## 2023-03-28 PROCEDURE — C1769: CPT

## 2023-03-28 PROCEDURE — 93005 ELECTROCARDIOGRAM TRACING: CPT

## 2023-03-28 RX ORDER — DILTIAZEM HCL 120 MG
5 CAPSULE, EXT RELEASE 24 HR ORAL ONCE
Refills: 0 | Status: COMPLETED | OUTPATIENT
Start: 2023-03-28 | End: 2023-03-28

## 2023-03-28 RX ORDER — RIVAROXABAN 15 MG-20MG
1 KIT ORAL
Refills: 0 | DISCHARGE

## 2023-03-28 RX ORDER — RIVAROXABAN 15 MG-20MG
20 KIT ORAL
Qty: 0 | Refills: 0 | DISCHARGE
Start: 2023-03-28

## 2023-03-28 RX ORDER — SODIUM CHLORIDE 9 MG/ML
250 INJECTION INTRAMUSCULAR; INTRAVENOUS; SUBCUTANEOUS ONCE
Refills: 0 | Status: DISCONTINUED | OUTPATIENT
Start: 2023-03-28 | End: 2023-04-12

## 2023-03-28 RX ORDER — DILTIAZEM HCL 120 MG
180 CAPSULE, EXT RELEASE 24 HR ORAL ONCE
Refills: 0 | Status: COMPLETED | OUTPATIENT
Start: 2023-03-28 | End: 2023-03-28

## 2023-03-28 RX ADMIN — Medication 180 MILLIGRAM(S): at 14:10

## 2023-03-28 RX ADMIN — Medication 5 MILLIGRAM(S): at 14:50

## 2023-03-28 NOTE — DISCHARGE NOTE PROVIDER - CARE PROVIDER_API CALL
Kiran Yang)  Cardiovascular Disease; Interventional Cardiology  [address] [place] [address]  Phone: [phone]  Fax: [phone]  Follow Up Time:

## 2023-03-28 NOTE — DISCHARGE NOTE PROVIDER - NSDCCPTREATMENT_GEN_ALL_CORE_FT
PRINCIPAL PROCEDURE  Procedure: Left heart cardiac cath  Findings and Treatment: Go to the ED with any acute onset of chest pain, palpitations, shortness of breath or dizziness.   Managing risk factors will help prevent future blockages, risk factors may include: high blood pressure, high cholesterol, obesity, sedentary life style and smoking.    Your diet should be low in fat, cholesterol, salt and carbohydrates, increase fruits (caution if diabetic), vegetables and whole grains/fiber rich foods.   Take all your cardiac  medications as prescribed.    Restricted use with no heavy lifting of affected arm for 48 hours.  No submerging the arm in water for 48 hours.  You may start showering today.  Call your doctor for any bleeding, swelling, loss of sensation in the hand or fingers, or fingers turning blue.  If heavy bleeding or large lumps form, hold pressure at the spot and come to the Emergency Room.  access  Exercise is a very important factor in heart health. Once your post procedure restrictions have passed, you should engage in heart healthy, aerobic exercise. Be sure to have clearance from your cardiologist. Cardiac rehab programs could be extremely beneficial and your cardiologist could help set this up.   Follow up with your cardiologist within 1-2 weeks after your procedure.   Call your cardiologist or our unit ([phone]) with any questions or concerns that may arise.

## 2023-03-28 NOTE — DISCHARGE NOTE PROVIDER - HOSPITAL COURSE
77 year old female h/o HTN, HLD, DM, COPD, prior smoker and Afib on xarelto (last dose 3/26) who c/o SOB and MURCIA.  NST revealed small sized, mild to mod severity, apical and distal inferior, partially reversible defect.  For LHC to assess coronary anatomy.      s/p LHC via RRA with Dr. Yang tolerated procedure well. Pt arrived to recovery in NAD and HDS, RRA access site stable, no bleed/hematoma, distal pulse +,   Intraprocedurally: Heparin 4,000 Units, Versed 1 mg Fentanyl 25 ucg, Heparin 4,000 Units, Cardizem 10 mg IV  Findings: No Obstructive CAD      Plan:  -Formal cath report pending  -Post procedure management/monitoring per protocol  -Access site precautions  -Radial compression band removal at 1800  -Bedrest while radial band on then OOB as tolerated  -NS 0.9% 250ml/hr x 1 bolus: post procedure MELCHOR ppx   -Repeat ECG if any clinical indication or change on tele  -Continue current medical therapy  -Resume Xarelto tonight  -Cont statin therapy with Lipitor 40 mg po qHS   -Educated regarding strict adherence with meds  -Educated regarding post procedure management and care  -Discussed the importance of RF modification  -Cardiac rehab info provided/referral and communication to cardiac rehab completed  -F/U outpt in 1-2 weeks with Cardiologist Dr. Yang  -DISPO:  Plan for D/C if remains HDS and radial site stable and without complications

## 2023-03-28 NOTE — DISCHARGE NOTE PROVIDER - NSDCCPCAREPLAN_GEN_ALL_CORE_FT
PRINCIPAL DISCHARGE DIAGNOSIS  Diagnosis: Chronic atrial fibrillation  Assessment and Plan of Treatment:       SECONDARY DISCHARGE DIAGNOSES  Diagnosis: Hypertension  Assessment and Plan of Treatment: Continue to take antihypertensive medications as prescribed. Obtain a home blood pressure monitor (at any pharmacy or medical supply store) and monitor blood pressure trends. Call your doctor if you note you blood pressure to be running much higher or lower than usual. Limit salt intake.

## 2023-03-28 NOTE — DISCHARGE NOTE PROVIDER - NSDCFUSCHEDAPPT_GEN_ALL_CORE_FT
Kaley Alvarenga  [facility] Physician [facility] [address]  Scheduled Appointment: 05/16/2023

## 2023-03-28 NOTE — PROGRESS NOTE ADULT - SUBJECTIVE AND OBJECTIVE BOX
Now s/p LHC via RRA with Dr. Yang tolerated procedure well. Pt arrived to recovery in NAD and HDS, RRA access site stable, no bleed/hematoma, distal pulse +,   Intraprocedurally: Heparin 4,000 Units, Versed 1 mg Fentanyl 25 ucg, Heparin 4,000 Units, Cardizem 10 mg IV  Findings: No Obstructive CAD      Plan:  -Formal cath report pending  -Post procedure management/monitoring per protocol  -Access site precautions  -Radial compression band removal at 1800  -Bedrest while radial band on then OOB as tolerated  -NS 0.9% 250ml/hr x 1 bolus: post procedure MELCHOR ppx   -Repeat ECG if any clinical indication or change on tele  -Continue current medical therapy  -Resume Xarelto tonight  -Cont statin therapy with Lipitor 40 mg po qHS   -Educated regarding strict adherence with meds  -Educated regarding post procedure management and care  -Discussed the importance of RF modification  -Cardiac rehab info provided/referral and communication to cardiac rehab completed  -F/U outpt in 1-2 weeks with Cardiologist Dr. Yang  -DISPO:  Plan for D/C if remains HDS and radial site stable and without complications

## 2023-03-28 NOTE — DISCHARGE NOTE NURSING/CASE MANAGEMENT/SOCIAL WORK - PATIENT PORTAL LINK FT
You can access the FollowMyHealth Patient Portal offered by [facility] by registering at the following website: [email or web address]. By joining [employer]’s FollowMyHealth portal, you will also be able to view your health information using other applications (apps) compatible with our system.

## 2023-03-28 NOTE — DISCHARGE NOTE PROVIDER - NSDCMRMEDTOKEN_GEN_ALL_CORE_FT
Abilify 30 mg oral tablet: 1 orally once a day  aspirin 81 mg oral tablet: 81 milligram(s) orally once a day  atorvastatin 40 mg oral tablet: 1 tab(s) orally once a day  clonazePAM 0.5 mg oral tablet: 1 tab(s) orally once a day  DilTIAZem Hydrochloride  mg/24 hours oral capsule, extended release: 1 tab(s) orally once a day  enalapril 5 mg oral tablet: 1 tab(s) orally 2 times a day  metFORMIN 500 mg oral tablet: 1 tab(s) orally once a day Hold 48 hours post procedure  pioglitazone 15 mg oral tablet: 1 tab(s) orally once a day  triamterene-hydrochlorothiazide 37.5 mg-25 mg oral capsule: 1 orally once a day  Xarelto 20 mg oral tablet: 20 milligram(s) orally once a day

## 2023-03-28 NOTE — DISCHARGE NOTE PROVIDER - NS AS DC PROVIDER CONTACT Y/N MULTI
Yes [Absent] : Adnexa(ae): Absent [Normal] : Recto-Vaginal Exam: Normal [Fully active, able to carry on all pre-disease performance without restriction] : Status 0 - Fully active, able to carry on all pre-disease performance without restriction [de-identified] : well healed LSC scars

## 2023-03-30 ENCOUNTER — APPOINTMENT (OUTPATIENT)
Dept: PULMONOLOGY | Facility: CLINIC | Age: 78
End: 2023-03-30

## 2023-04-04 ENCOUNTER — APPOINTMENT (OUTPATIENT)
Dept: PULMONOLOGY | Facility: CLINIC | Age: 78
End: 2023-04-04

## 2023-04-06 ENCOUNTER — APPOINTMENT (OUTPATIENT)
Dept: PULMONOLOGY | Facility: CLINIC | Age: 78
End: 2023-04-06

## 2023-04-11 ENCOUNTER — APPOINTMENT (OUTPATIENT)
Dept: PULMONOLOGY | Facility: CLINIC | Age: 78
End: 2023-04-11

## 2023-04-13 ENCOUNTER — APPOINTMENT (OUTPATIENT)
Dept: PULMONOLOGY | Facility: CLINIC | Age: 78
End: 2023-04-13

## 2023-04-18 ENCOUNTER — APPOINTMENT (OUTPATIENT)
Dept: PULMONOLOGY | Facility: CLINIC | Age: 78
End: 2023-04-18

## 2023-04-20 ENCOUNTER — APPOINTMENT (OUTPATIENT)
Dept: PULMONOLOGY | Facility: CLINIC | Age: 78
End: 2023-04-20

## 2023-04-25 ENCOUNTER — APPOINTMENT (OUTPATIENT)
Dept: PULMONOLOGY | Facility: CLINIC | Age: 78
End: 2023-04-25

## 2023-04-27 ENCOUNTER — APPOINTMENT (OUTPATIENT)
Dept: PULMONOLOGY | Facility: CLINIC | Age: 78
End: 2023-04-27

## 2023-04-27 PROBLEM — E11.9 TYPE 2 DIABETES MELLITUS WITHOUT COMPLICATIONS: Chronic | Status: ACTIVE | Noted: 2023-03-27

## 2023-04-27 PROBLEM — E78.5 HYPERLIPIDEMIA, UNSPECIFIED: Chronic | Status: ACTIVE | Noted: 2023-03-27

## 2023-04-27 PROBLEM — J44.9 CHRONIC OBSTRUCTIVE PULMONARY DISEASE, UNSPECIFIED: Chronic | Status: ACTIVE | Noted: 2023-03-27

## 2023-04-27 PROBLEM — Z87.891 PERSONAL HISTORY OF NICOTINE DEPENDENCE: Chronic | Status: ACTIVE | Noted: 2023-03-27

## 2023-04-27 PROBLEM — I48.21 PERMANENT ATRIAL FIBRILLATION: Chronic | Status: ACTIVE | Noted: 2023-03-27

## 2023-04-27 PROBLEM — I10 ESSENTIAL (PRIMARY) HYPERTENSION: Chronic | Status: ACTIVE | Noted: 2023-03-27

## 2023-05-02 ENCOUNTER — OUTPATIENT (OUTPATIENT)
Dept: OUTPATIENT SERVICES | Facility: HOSPITAL | Age: 78
LOS: 1 days | End: 2023-05-02
Payer: MEDICARE

## 2023-05-02 ENCOUNTER — TRANSCRIPTION ENCOUNTER (OUTPATIENT)
Age: 78
End: 2023-05-02

## 2023-05-02 VITALS — HEART RATE: 71 BPM | RESPIRATION RATE: 18 BRPM | SYSTOLIC BLOOD PRESSURE: 111 MMHG | DIASTOLIC BLOOD PRESSURE: 74 MMHG

## 2023-05-02 VITALS
DIASTOLIC BLOOD PRESSURE: 65 MMHG | SYSTOLIC BLOOD PRESSURE: 119 MMHG | TEMPERATURE: 98 F | OXYGEN SATURATION: 95 % | RESPIRATION RATE: 20 BRPM | HEART RATE: 88 BPM

## 2023-05-02 DIAGNOSIS — Z90.710 ACQUIRED ABSENCE OF BOTH CERVIX AND UTERUS: Chronic | ICD-10-CM

## 2023-05-02 DIAGNOSIS — I40.8 OTHER ACUTE MYOCARDITIS: ICD-10-CM

## 2023-05-02 DIAGNOSIS — Z98.890 OTHER SPECIFIED POSTPROCEDURAL STATES: Chronic | ICD-10-CM

## 2023-05-02 LAB
ANION GAP SERPL CALC-SCNC: 13 MMOL/L — SIGNIFICANT CHANGE UP (ref 5–17)
BUN SERPL-MCNC: 23.7 MG/DL — HIGH (ref 8–20)
CALCIUM SERPL-MCNC: 9 MG/DL — SIGNIFICANT CHANGE UP (ref 8.4–10.5)
CHLORIDE SERPL-SCNC: 105 MMOL/L — SIGNIFICANT CHANGE UP (ref 96–108)
CO2 SERPL-SCNC: 25 MMOL/L — SIGNIFICANT CHANGE UP (ref 22–29)
CREAT SERPL-MCNC: 0.62 MG/DL — SIGNIFICANT CHANGE UP (ref 0.5–1.3)
EGFR: 93 ML/MIN/1.73M2 — SIGNIFICANT CHANGE UP
GLUCOSE SERPL-MCNC: 119 MG/DL — HIGH (ref 70–99)
HCT VFR BLD CALC: 31.6 % — LOW (ref 34.5–45)
HGB BLD-MCNC: 10.4 G/DL — LOW (ref 11.5–15.5)
MAGNESIUM SERPL-MCNC: 1.9 MG/DL — SIGNIFICANT CHANGE UP (ref 1.8–2.6)
MCHC RBC-ENTMCNC: 28.8 PG — SIGNIFICANT CHANGE UP (ref 27–34)
MCHC RBC-ENTMCNC: 32.9 GM/DL — SIGNIFICANT CHANGE UP (ref 32–36)
MCV RBC AUTO: 87.5 FL — SIGNIFICANT CHANGE UP (ref 80–100)
PLATELET # BLD AUTO: 224 K/UL — SIGNIFICANT CHANGE UP (ref 150–400)
POTASSIUM SERPL-MCNC: 4.1 MMOL/L — SIGNIFICANT CHANGE UP (ref 3.5–5.3)
POTASSIUM SERPL-SCNC: 4.1 MMOL/L — SIGNIFICANT CHANGE UP (ref 3.5–5.3)
RBC # BLD: 3.61 M/UL — LOW (ref 3.8–5.2)
RBC # FLD: 15.1 % — HIGH (ref 10.3–14.5)
SODIUM SERPL-SCNC: 142 MMOL/L — SIGNIFICANT CHANGE UP (ref 135–145)
WBC # BLD: 7.22 K/UL — SIGNIFICANT CHANGE UP (ref 3.8–10.5)
WBC # FLD AUTO: 7.22 K/UL — SIGNIFICANT CHANGE UP (ref 3.8–10.5)

## 2023-05-02 PROCEDURE — 93005 ELECTROCARDIOGRAM TRACING: CPT

## 2023-05-02 PROCEDURE — 85027 COMPLETE CBC AUTOMATED: CPT

## 2023-05-02 PROCEDURE — 83735 ASSAY OF MAGNESIUM: CPT

## 2023-05-02 PROCEDURE — 93010 ELECTROCARDIOGRAM REPORT: CPT

## 2023-05-02 PROCEDURE — 80048 BASIC METABOLIC PNL TOTAL CA: CPT

## 2023-05-02 PROCEDURE — 36415 COLL VENOUS BLD VENIPUNCTURE: CPT

## 2023-05-02 PROCEDURE — 92960 CARDIOVERSION ELECTRIC EXT: CPT

## 2023-05-02 RX ORDER — TRIAMTERENE/HYDROCHLOROTHIAZID 75 MG-50MG
1 TABLET ORAL
Refills: 0 | DISCHARGE

## 2023-05-02 NOTE — DISCHARGE NOTE PROVIDER - NSDCMRMEDTOKEN_GEN_ALL_CORE_FT
Abilify 30 mg oral tablet: 1 orally once a day  aspirin 81 mg oral tablet: 81 milligram(s) orally once a day  atorvastatin 40 mg oral tablet: 1 tab(s) orally once a day  clonazePAM 0.5 mg oral tablet: 1 tab(s) orally once a day  DilTIAZem Hydrochloride  mg/24 hours oral capsule, extended release: 1 tab(s) orally once a day  enalapril 5 mg oral tablet: 1 tab(s) orally 2 times a day  furosemide 20 mg oral tablet: 1 orally once a day  metFORMIN 500 mg oral tablet: 1 tab(s) orally once a day Hold 48 hours post procedure  Metoprolol Succinate ER 50 mg oral tablet, extended release: 1 orally once a day  pioglitazone 15 mg oral tablet: 1 tab(s) orally once a day  potassium chloride 10 mEq oral capsule, extended release: 1 orally once a day  Xarelto 20 mg oral tablet: 20 milligram(s) orally once a day

## 2023-05-02 NOTE — ASU PATIENT PROFILE, ADULT - FALL HARM RISK - RISK INTERVENTIONS

## 2023-05-02 NOTE — DISCHARGE NOTE PROVIDER - NSDCFUADDINST_GEN_ALL_CORE_FT
Follow up with Dr. Yang next week. Please take your blood thinning medication without interruption.

## 2023-05-02 NOTE — PROGRESS NOTE ADULT - SUBJECTIVE AND OBJECTIVE BOX
BRIEF POST-OP NOTE    I have personally seen and examined the patient today in cath lab holding area spot 6.    PRE-OP DIAGNOSIS: Persistent Atrial fibrillation     POST-OP DIAGNOSIS: SR with ectopic atrial pacemaker site. P wave axis is superiorly directed with short MA. Positive in lead I.     PROCEDURE: [facility]    Physician: Dr. Yang.     ESTIMATED BLOOD LOSS: None    ANESTHESIA TYPE:  [  ]General Anesthesia  [ x ]Sedation  [  ]Local/Regional    CONDITION:  [  ]Critical  [  ]Serious  [ x ]Stable  [  ]Good    EKG: SR (P wave is positive in lead I, short MA with superior axis) at rate of 58bpm; QRSD 80ms.     Vital Signs   HR: 63 (02 May 2023 15:15) (61 - 88)  BP: 124/67 (02 May 2023 15:15) (119/65 - 132/56)  RR: 17 (02 May 2023 15:15) (17 - 20)  SpO2: 96% (02 May 2023 15:14) (95% - 96%)    Physical Exam:  Constitutional: NAD, AAOx3  Cardiovascular: +S1S2 RRR; 3/6 AGUSTINA at LSB  Pulmonary: global expiratory wheezes; late; unlabored  GI: soft NTND +BS  Extremities: 1+ no pitting edema bilaterally   Neuro: non focal, VALADEZ x4    A/P  77 year old female patient with a known history of anxiety, schizophrenia, depression, HTN, HLD, GERD, DM, COPD, prior smoker, and persistent atrial fibrillation on Xarelto who is now s/p DCCV x1 at 360J.     -Bedrest x 1 hours  -Cont Xarelto 20mg PO nightly  -Follow up next week with Dr. Yang  -Discharge home today.

## 2023-05-02 NOTE — DISCHARGE NOTE NURSING/CASE MANAGEMENT/SOCIAL WORK - PATIENT PORTAL LINK FT
You can access the FollowMyHealth Patient Portal offered by [facility] by registering at the following website: [email or web address]. By joining [employer]’s FollowMyHealth portal, you will also be able to view your health information using other applications (apps) compatible with our system.

## 2023-05-02 NOTE — H&P PST ADULT - ASSESSMENT
77 year old female patient with a known history of anxiety, schizophrenia, depression, HTN, HLD, GERD, DM, COPD, prior smoker, and persistent atrial fibrillation on Xarelto with CHADSVASC: 5 (age, gender, HTN, DM) who presents for elective DCCV.     LV gram with normal EF  Questionable compliance with Xarelto in last monthly    - Arrived in fasting state  - Last dose Xarelto on PM of 5/1/23  - PST labs and EKG drawn.  77 year old female patient with a known history of anxiety, schizophrenia, depression, HTN, HLD, GERD, DM, COPD, prior smoker, and persistent atrial fibrillation on Xarelto with CHADSVASC: 5 (age, gender, HTN, DM) who presents for elective DCCV.     LV gram with normal EF  Questionable compliance with Xarelto in last month. Reports she may have missed at least two doses    - Arrived in fasting state  - Last dose Xarelto on PM of 5/1/23  - PST labs and EKG performed.  77 year old female patient with a known history of anxiety, schizophrenia, depression, HTN, HLD, GERD, DM, COPD, prior smoker, and persistent atrial fibrillation on Xarelto with CHADSVASC: 5 (age, gender, HTN, DM) who presents for elective DCCV.     LV gram and TTE with normal EF  Questionable compliance with Xarelto in last month. Reports she may have missed at least two doses in last 30 days.     - Ate 2 hardboiled eggs this AM - procedure postponed till 14:00 per Anesthesiologist.   - Last dose Xarelto on PM of 5/1/23  - PST labs and EKG performed.

## 2023-05-02 NOTE — DISCHARGE NOTE NURSING/CASE MANAGEMENT/SOCIAL WORK - NSDCPEFALRISK_GEN_ALL_CORE
For information on Fall & Injury Prevention, visit: [email or web address] OR  [email or web address] OR  https://www.cdc.gov/steadi/patient.html

## 2023-05-02 NOTE — DISCHARGE NOTE PROVIDER - CARE PROVIDER_API CALL
Ladinsky, Michael A ()  Family Medicine; Geriatric Medicine  [address], [address]  [place] [address]  Phone: [phone]  Fax: [phone]  Established Patient  Follow Up Time: Routine    Kiran Yang)  Cardiology  [address]  [place] [address]  Phone: [phone]  Fax: [phone]  Established Patient  Follow Up Time: Routine   Ladinsky, Michael A ()  Family Medicine; Geriatric Medicine  [address], [address]  [place] [address]  Phone: [phone]  Fax: [phone]  Established Patient  Follow Up Time: Routine    Kiran Yang)  Cardiology  [address]  [place] [address]  Phone: [phone]  Fax: [phone]  Established Patient  Follow Up Time: 1 week

## 2023-05-02 NOTE — DISCHARGE NOTE PROVIDER - NSDCCPCAREPLAN_GEN_ALL_CORE_FT
PRINCIPAL DISCHARGE DIAGNOSIS  Diagnosis: Persistent atrial fibrillation  Assessment and Plan of Treatment:

## 2023-05-02 NOTE — H&P PST ADULT - HISTORY OF PRESENT ILLNESS
This is a 77 year old female patient with a known history of HTN, HLD, DM, COPD, prior smoker, and paroxysmal atrial fibrillation on Xarelto who presented initially with complaints of SOB and MURCIA. A nuclear stress test revealed a small sized, mild to mod severity, apical and distal inferior, partially reversible defect. She underwent a LHC via the RRA on 3/27/23 which revealed no obstructive CAD.     Cardiac Summary:  This is a 77 year old female patient with a known history of anxiety, schizophrenia, depression, HTN, HLD, GERD, DM, COPD, prior smoker, and persistent atrial fibrillation on Xarelto who presented initially with complaints of SOB and MURCIA to Dr. Ladinsky's office. She was referred to Dr. Yang. A nuclear stress test revealed a small sized, mild to mod severity, apical and distal inferior, partially reversible defect. She underwent a LHC via the RRA on 3/27/23 which revealed no obstructive CAD. In terms of her AFib she reports she does not have typical symptoms. She reports that had Dr. Ladinsky not performed an EKG she wouldn't be able to tell if she has AFib. She continues to have occasional dyspnea. She admits to missing 2-3 doses on Xarelto in the last month or so. She also admits to a recent fall at home when she slipped. She otherwise denies any recent fever or chills, dizziness, palpitations or syncope.     Cardiac Summary:   LHC 3/28/23   Diagnostic Conclusions:   Normal EF   Mild to moderate stenosis of the mid LAD and OM3.    This is a 77 year old female patient with a known history of anxiety, schizophrenia, depression, HTN, HLD, GERD, DM, COPD, prior smoker, and persistent atrial fibrillation on Xarelto who presented initially with complaints of SOB and MURCIA to Dr. Ladinsky's office. She was referred to Dr. Yang. A nuclear stress test revealed a small sized, mild to mod severity, apical and distal inferior, partially reversible defect. She underwent a LHC via the RRA on 3/27/23 which revealed no obstructive CAD. In terms of her AFib she reports she does not have typical symptoms. She reports that had Dr. Ladinsky not performed an EKG she wouldn't be able to tell if she has AFib. She continues to have occasional dyspnea. She admits to missing 2-3 doses on Xarelto in the last month or so. She also admits to a recent fall at home when she slipped. She otherwise denies any recent fever or chills, dizziness, palpitations or syncope.     Cardiac Summary:   LHC 3/28/23   Diagnostic Conclusions:   Normal EF   Mild to moderate stenosis of the mid LAD and OM3.   TTE: 2/28/23: EF 55-60%; normal LA;

## 2023-05-02 NOTE — DISCHARGE NOTE PROVIDER - NSDCFUSCHEDAPPT_GEN_ALL_CORE_FT
Kaley Alvarenga  [facility] Physician [facility] [address]  Scheduled Appointment: 05/16/2023

## 2023-05-02 NOTE — DISCHARGE NOTE PROVIDER - PROVIDER TOKENS
PROVIDER:[TOKEN:[[id number]],FOLLOWUP:[Routine],ESTABLISHEDPATIENT:[T]],PROVIDER:[[id number]],FOLLOWUP:[Routine],ESTABLISHEDPATIENT:[T]] PROVIDER:[TOKEN:[[id number]],FOLLOWUP:[Routine],ESTABLISHEDPATIENT:[T]],PROVIDER:[[id number]],FOLLOWUP:[1 week],ESTABLISHEDPATIENT:[T]]

## 2023-05-02 NOTE — DISCHARGE NOTE PROVIDER - NSDCCPTREATMENT_GEN_ALL_CORE_FT
PRINCIPAL PROCEDURE  Procedure: External cardioversion  Findings and Treatment: -Take each dose of your anticoagulation medication (blood thinner) exactly as prescribed.   -Resume your diet with soft foods first.  - Do not drive, operate heavy machinery or make important decisions for 24 hours following the procedure.  - You may resume all other activities the day after the procedure.  Call your doctor if:   -you develop a fever, cough up blood, have any chest pain, palpitations or difficulty breathing.  - you have any questions or concerns regarding the procedure.  If you experience increased difficulty breathing or chest pain, or if you faint, have dizzy spells, or for any other distressing symptom, please seek immediate medical attention.

## 2023-05-02 NOTE — DISCHARGE NOTE PROVIDER - HOSPITAL COURSE
77 year old female patient with a known history of anxiety, schizophrenia, depression, HTN, HLD, GERD, DM, COPD, prior smoker, and persistent atrial fibrillation on Xarelto who is now s/p successful DCCV x 1 at 360J.

## 2023-05-12 ENCOUNTER — APPOINTMENT (OUTPATIENT)
Dept: CT IMAGING | Facility: CLINIC | Age: 78
End: 2023-05-12
Payer: MEDICARE

## 2023-05-12 ENCOUNTER — OUTPATIENT (OUTPATIENT)
Dept: OUTPATIENT SERVICES | Facility: HOSPITAL | Age: 78
LOS: 1 days | End: 2023-05-12
Payer: MEDICARE

## 2023-05-12 DIAGNOSIS — Z98.890 OTHER SPECIFIED POSTPROCEDURAL STATES: Chronic | ICD-10-CM

## 2023-05-12 DIAGNOSIS — Z90.710 ACQUIRED ABSENCE OF BOTH CERVIX AND UTERUS: Chronic | ICD-10-CM

## 2023-05-12 DIAGNOSIS — R93.89 ABNORMAL FINDINGS ON DIAGNOSTIC IMAGING OF OTHER SPECIFIED BODY STRUCTURES: ICD-10-CM

## 2023-05-12 PROCEDURE — 71250 CT THORAX DX C-: CPT

## 2023-05-12 PROCEDURE — 71250 CT THORAX DX C-: CPT | Mod: 26,MH

## 2023-05-24 ENCOUNTER — APPOINTMENT (OUTPATIENT)
Dept: PULMONOLOGY | Facility: CLINIC | Age: 78
End: 2023-05-24
Payer: MEDICARE

## 2023-05-24 VITALS
RESPIRATION RATE: 16 BRPM | SYSTOLIC BLOOD PRESSURE: 118 MMHG | DIASTOLIC BLOOD PRESSURE: 80 MMHG | OXYGEN SATURATION: 96 % | HEIGHT: 62 IN | WEIGHT: 225 LBS | HEART RATE: 80 BPM | BODY MASS INDEX: 41.41 KG/M2

## 2023-05-24 VITALS — RESPIRATION RATE: 16 BRPM

## 2023-05-24 DIAGNOSIS — J44.9 CHRONIC OBSTRUCTIVE PULMONARY DISEASE, UNSPECIFIED: ICD-10-CM

## 2023-05-24 DIAGNOSIS — Z01.811 ENCOUNTER FOR PREPROCEDURAL RESPIRATORY EXAMINATION: ICD-10-CM

## 2023-05-24 DIAGNOSIS — Z99.89 OBSTRUCTIVE SLEEP APNEA (ADULT) (PEDIATRIC): ICD-10-CM

## 2023-05-24 DIAGNOSIS — R93.89 ABNORMAL FINDINGS ON DIAGNOSTIC IMAGING OF OTHER SPECIFIED BODY STRUCTURES: ICD-10-CM

## 2023-05-24 DIAGNOSIS — G47.33 OBSTRUCTIVE SLEEP APNEA (ADULT) (PEDIATRIC): ICD-10-CM

## 2023-05-24 PROCEDURE — 99214 OFFICE O/P EST MOD 30 MIN: CPT

## 2023-05-24 RX ORDER — ASPIRIN 81 MG
81 TABLET, DELAYED RELEASE (ENTERIC COATED) ORAL
Refills: 0 | Status: ACTIVE | COMMUNITY

## 2023-05-24 RX ORDER — PIOGLITAZONE HYDROCHLORIDE 15 MG/1
15 TABLET ORAL
Refills: 0 | Status: ACTIVE | COMMUNITY

## 2023-05-24 RX ORDER — ENALAPRIL MALEATE 5 MG/1
5 TABLET ORAL
Refills: 0 | Status: ACTIVE | COMMUNITY

## 2023-05-24 RX ORDER — UMECLIDINIUM BROMIDE AND VILANTEROL TRIFENATATE 62.5; 25 UG/1; UG/1
62.5-25 POWDER RESPIRATORY (INHALATION)
Qty: 3 | Refills: 3 | Status: DISCONTINUED | COMMUNITY
Start: 2020-11-02 | End: 2023-05-24

## 2023-05-24 RX ORDER — ALBUTEROL SULFATE 90 UG/1
108 (90 BASE) INHALANT RESPIRATORY (INHALATION)
Qty: 1 | Refills: 4 | Status: DISCONTINUED | COMMUNITY
Start: 2021-03-09 | End: 2023-05-24

## 2023-05-24 RX ORDER — FUROSEMIDE 80 MG/1
TABLET ORAL
Refills: 0 | Status: ACTIVE | COMMUNITY

## 2023-05-24 NOTE — ASSESSMENT
[FreeTextEntry1] : Patient has moderate COPD reasonably stable.  A lot of her dyspnea is on the basis of obesity and possibly atrial fibrillation.  She is cleared for ablation from a pulmonary point of view.  I suggest that she take her CPAP to the hospital and be on it during the procedure while sedated.\par \par Chest CT scan shows a superior segment left lower lobe nodule that is being read as more prominent by the radiologist.  A short-term follow-up CT in 3 months will be done and I will see her back after that.\par Trelegy samples were given to the patient to try for the next month.  If she finds that it helps, I will order it for her.

## 2023-05-24 NOTE — HISTORY OF PRESENT ILLNESS
[TextBox_4] : The patient remains compliant with CPAP for her moderate obstructive sleep apnea.  She continues with significant dyspnea on exertion.  She has atrial fibrillation and is going for ablation in a few weeks.  She has not been using Anoro.  She wishes to try Trelegy.  She also had a follow-up CT scan regarding her lung nodules.

## 2023-08-30 ENCOUNTER — APPOINTMENT (OUTPATIENT)
Dept: PULMONOLOGY | Facility: CLINIC | Age: 78
End: 2023-08-30

## 2024-01-31 ENCOUNTER — APPOINTMENT (OUTPATIENT)
Dept: ORTHOPEDIC SURGERY | Facility: CLINIC | Age: 79
End: 2024-01-31
Payer: MEDICARE

## 2024-01-31 VITALS — HEIGHT: 62 IN | BODY MASS INDEX: 41.41 KG/M2 | WEIGHT: 225 LBS

## 2024-01-31 DIAGNOSIS — S53.409A UNSPECIFIED SPRAIN OF UNSPECIFIED ELBOW, INITIAL ENCOUNTER: ICD-10-CM

## 2024-01-31 DIAGNOSIS — S46.911A STRAIN OF UNSPECIFIED MUSCLE, FASCIA AND TENDON AT SHOULDER AND UPPER ARM LEVEL, RIGHT ARM, INITIAL ENCOUNTER: ICD-10-CM

## 2024-01-31 DIAGNOSIS — S53.402A UNSPECIFIED SPRAIN OF LEFT ELBOW, INITIAL ENCOUNTER: ICD-10-CM

## 2024-01-31 DIAGNOSIS — S46.912A STRAIN OF UNSPECIFIED MUSCLE, FASCIA AND TENDON AT SHOULDER AND UPPER ARM LEVEL, LEFT ARM, INITIAL ENCOUNTER: ICD-10-CM

## 2024-01-31 PROCEDURE — 73030 X-RAY EXAM OF SHOULDER: CPT | Mod: LT

## 2024-01-31 PROCEDURE — 99204 OFFICE O/P NEW MOD 45 MIN: CPT

## 2024-02-01 NOTE — DATA REVIEWED
[Elbow] : elbow [Outside X-rays] : outside x-rays [Right] : of the right [Shoulder] : shoulder [Report was reviewed and noted in the chart] : The report was reviewed and noted in the chart [I reviewed the films/CD and agree] : I reviewed the films/CD and agree [FreeTextEntry1] : oa

## 2024-02-01 NOTE — PHYSICAL EXAM
[3 ___] : forward flexion 3[unfilled]/5 [3___] : abduction 3[unfilled]/5 [Degenerative change] : Degenerative change [Right] : right elbow [Left] : left elbow [NL (0)] : extension 0 degrees [4___] : supination 4[unfilled]/5 [] : light touch intact [TWNoteComboBox7] : flexion 130 degrees [TWNoteComboBox6] : pronation 80 degrees [de-identified] : supination 80 degrees

## 2024-02-01 NOTE — DISCUSSION/SUMMARY
[de-identified] : hep and Pt. discussed options with patient and daughter. she has increased dysfunction in both elbow and shoulders after fall but underlying oa in all 4 joints. PT now. follow up 6 weeks. injuries 6 weeks old and oa present so do not think mris necessary at this point.

## 2024-02-01 NOTE — HISTORY OF PRESENT ILLNESS
[10] : 10 [0] : 0 [Sharp] : sharp [Shooting] : shooting [Rest] : rest [Meds] : meds [] : yes [de-identified] : 1/31/24:  bilateral elbow and shoulder pain after fall.   [FreeTextEntry1] : BL shoulder/ elbows  [FreeTextEntry3] : 2 months ago [FreeTextEntry5] : Pt had fallen  [de-identified] : activity, putting seatbelt on  [de-identified] : PCP- RX diclofenac  [de-identified] : XR agatha hill

## 2024-02-01 NOTE — ASSESSMENT
[FreeTextEntry1] : bilateral shoulder pain, bilateral elbow pain after fall around in mid december.  right shoulder pain.  oa present.  worse motion.  concern for cuff tear. strain. left shoulder pain.  concern for cuff tear.  oa present and some inferior subluxation but in place. left elbow pain.  oa present.  no acute fracture seen.  some weakness with extesion but ttp lateral epicondyle triceps feels intact. right elbow pain.  oas present.  possible sprain. strain. no acute fracture seen.  diabetes. emphysema. copd.

## 2024-02-12 ENCOUNTER — OFFICE (OUTPATIENT)
Dept: URBAN - METROPOLITAN AREA CLINIC 12 | Facility: CLINIC | Age: 79
Setting detail: OPHTHALMOLOGY
End: 2024-02-12
Payer: MEDICARE

## 2024-02-12 DIAGNOSIS — H43.813: ICD-10-CM

## 2024-02-12 DIAGNOSIS — E11.9: ICD-10-CM

## 2024-02-12 DIAGNOSIS — D31.31: ICD-10-CM

## 2024-02-12 DIAGNOSIS — H40.033: ICD-10-CM

## 2024-02-12 DIAGNOSIS — Z96.1: ICD-10-CM

## 2024-02-12 DIAGNOSIS — H43.393: ICD-10-CM

## 2024-02-12 PROCEDURE — 92250 FUNDUS PHOTOGRAPHY W/I&R: CPT | Performed by: OPTOMETRIST

## 2024-02-12 PROCEDURE — 92014 COMPRE OPH EXAM EST PT 1/>: CPT | Performed by: OPTOMETRIST

## 2024-02-12 ASSESSMENT — REFRACTION_MANIFEST
OD_SPHERE: +2.50
OD_AXIS: 100
OD_CYLINDER: -0.50
OS_AXIS: 65
OS_CYLINDER: -0.50
OU_VA: 20/20
OD_VA1: 20/30
OS_SPHERE: +2.25
OS_VA1: 20/30

## 2024-02-12 ASSESSMENT — REFRACTION_AUTOREFRACTION
OD_AXIS: 113
OS_AXIS: 088
OD_CYLINDER: -1.25
OD_SPHERE: +0.75
OS_SPHERE: +0.25
OS_CYLINDER: -0.50

## 2024-02-12 ASSESSMENT — SPHEQUIV_DERIVED
OD_SPHEQUIV: 0.125
OD_SPHEQUIV: 2.25
OS_SPHEQUIV: 0
OS_SPHEQUIV: 2

## 2024-02-12 ASSESSMENT — REFRACTION_CURRENTRX
OS_SPHERE: +2.50
OD_OVR_VA: 20/
OD_VPRISM_DIRECTION: PROGS
OD_AXIS: 067
OD_ADD: +2.75
OD_CYLINDER: -0.25
OS_VPRISM_DIRECTION: PROGS
OS_CYLINDER: SPH
OS_ADD: +2.75
OS_OVR_VA: 20/
OD_SPHERE: +2.50

## 2024-02-12 ASSESSMENT — CONFRONTATIONAL VISUAL FIELD TEST (CVF)
OD_FINDINGS: FULL
OS_FINDINGS: FULL

## 2024-02-22 ENCOUNTER — TRANSCRIPTION ENCOUNTER (OUTPATIENT)
Age: 79
End: 2024-02-22

## 2024-02-22 ENCOUNTER — INPATIENT (INPATIENT)
Facility: HOSPITAL | Age: 79
LOS: 8 days | Discharge: HOME | DRG: 378 | End: 2024-03-02
Attending: INTERNAL MEDICINE | Admitting: HOSPITALIST
Payer: MEDICARE

## 2024-02-22 VITALS
HEIGHT: 63 IN | WEIGHT: 216.93 LBS | RESPIRATION RATE: 24 BRPM | HEART RATE: 46 BPM | OXYGEN SATURATION: 94 % | DIASTOLIC BLOOD PRESSURE: 86 MMHG | SYSTOLIC BLOOD PRESSURE: 122 MMHG

## 2024-02-22 DIAGNOSIS — K21.9 GASTRO-ESOPHAGEAL REFLUX DISEASE WITHOUT ESOPHAGITIS: ICD-10-CM

## 2024-02-22 DIAGNOSIS — E66.9 OBESITY, UNSPECIFIED: ICD-10-CM

## 2024-02-22 DIAGNOSIS — I10 ESSENTIAL (PRIMARY) HYPERTENSION: ICD-10-CM

## 2024-02-22 DIAGNOSIS — E11.9 TYPE 2 DIABETES MELLITUS WITHOUT COMPLICATIONS: ICD-10-CM

## 2024-02-22 DIAGNOSIS — D50.9 IRON DEFICIENCY ANEMIA, UNSPECIFIED: ICD-10-CM

## 2024-02-22 DIAGNOSIS — I50.32 CHRONIC DIASTOLIC (CONGESTIVE) HEART FAILURE: ICD-10-CM

## 2024-02-22 DIAGNOSIS — Z90.710 ACQUIRED ABSENCE OF BOTH CERVIX AND UTERUS: Chronic | ICD-10-CM

## 2024-02-22 DIAGNOSIS — K92.2 GASTROINTESTINAL HEMORRHAGE, UNSPECIFIED: ICD-10-CM

## 2024-02-22 DIAGNOSIS — Z98.890 OTHER SPECIFIED POSTPROCEDURAL STATES: Chronic | ICD-10-CM

## 2024-02-22 DIAGNOSIS — S43.014A ANTERIOR DISLOCATION OF RIGHT HUMERUS, INITIAL ENCOUNTER: ICD-10-CM

## 2024-02-22 DIAGNOSIS — R60.1 GENERALIZED EDEMA: ICD-10-CM

## 2024-02-22 DIAGNOSIS — I48.0 PAROXYSMAL ATRIAL FIBRILLATION: ICD-10-CM

## 2024-02-22 DIAGNOSIS — E78.5 HYPERLIPIDEMIA, UNSPECIFIED: ICD-10-CM

## 2024-02-22 DIAGNOSIS — R00.1 BRADYCARDIA, UNSPECIFIED: ICD-10-CM

## 2024-02-22 LAB
ALBUMIN SERPL ELPH-MCNC: 3.5 G/DL — SIGNIFICANT CHANGE UP (ref 3.3–5.2)
ALP SERPL-CCNC: 162 U/L — HIGH (ref 40–120)
ALT FLD-CCNC: 20 U/L — SIGNIFICANT CHANGE UP
ANION GAP SERPL CALC-SCNC: 12 MMOL/L — SIGNIFICANT CHANGE UP (ref 5–17)
AST SERPL-CCNC: 24 U/L — SIGNIFICANT CHANGE UP
BASOPHILS # BLD AUTO: 0.02 K/UL — SIGNIFICANT CHANGE UP (ref 0–0.2)
BASOPHILS NFR BLD AUTO: 0.3 % — SIGNIFICANT CHANGE UP (ref 0–2)
BILIRUB SERPL-MCNC: 1 MG/DL — SIGNIFICANT CHANGE UP (ref 0.4–2)
BUN SERPL-MCNC: 27.2 MG/DL — HIGH (ref 8–20)
CALCIUM SERPL-MCNC: 8.7 MG/DL — SIGNIFICANT CHANGE UP (ref 8.4–10.5)
CHLORIDE SERPL-SCNC: 101 MMOL/L — SIGNIFICANT CHANGE UP (ref 96–108)
CO2 SERPL-SCNC: 25 MMOL/L — SIGNIFICANT CHANGE UP (ref 22–29)
CREAT SERPL-MCNC: 0.95 MG/DL — SIGNIFICANT CHANGE UP (ref 0.5–1.3)
EGFR: 62 ML/MIN/1.73M2 — SIGNIFICANT CHANGE UP
EOSINOPHIL # BLD AUTO: 0.1 K/UL — SIGNIFICANT CHANGE UP (ref 0–0.5)
EOSINOPHIL NFR BLD AUTO: 1.5 % — SIGNIFICANT CHANGE UP (ref 0–6)
GLUCOSE BLDC GLUCOMTR-MCNC: 112 MG/DL — HIGH (ref 70–99)
GLUCOSE BLDC GLUCOMTR-MCNC: 148 MG/DL — HIGH (ref 70–99)
GLUCOSE SERPL-MCNC: 98 MG/DL — SIGNIFICANT CHANGE UP (ref 70–99)
HCT VFR BLD CALC: 24.3 % — LOW (ref 34.5–45)
HGB BLD-MCNC: 7.6 G/DL — LOW (ref 11.5–15.5)
IMM GRANULOCYTES NFR BLD AUTO: 0.5 % — SIGNIFICANT CHANGE UP (ref 0–0.9)
LYMPHOCYTES # BLD AUTO: 0.59 K/UL — LOW (ref 1–3.3)
LYMPHOCYTES # BLD AUTO: 9 % — LOW (ref 13–44)
MAGNESIUM SERPL-MCNC: 2.1 MG/DL — SIGNIFICANT CHANGE UP (ref 1.6–2.6)
MCHC RBC-ENTMCNC: 27.8 PG — SIGNIFICANT CHANGE UP (ref 27–34)
MCHC RBC-ENTMCNC: 31.3 GM/DL — LOW (ref 32–36)
MCV RBC AUTO: 89 FL — SIGNIFICANT CHANGE UP (ref 80–100)
MONOCYTES # BLD AUTO: 0.85 K/UL — SIGNIFICANT CHANGE UP (ref 0–0.9)
MONOCYTES NFR BLD AUTO: 12.9 % — SIGNIFICANT CHANGE UP (ref 2–14)
NEUTROPHILS # BLD AUTO: 5 K/UL — SIGNIFICANT CHANGE UP (ref 1.8–7.4)
NEUTROPHILS NFR BLD AUTO: 75.8 % — SIGNIFICANT CHANGE UP (ref 43–77)
NT-PROBNP SERPL-SCNC: 328 PG/ML — HIGH (ref 0–300)
PLATELET # BLD AUTO: 253 K/UL — SIGNIFICANT CHANGE UP (ref 150–400)
POTASSIUM SERPL-MCNC: 4.5 MMOL/L — SIGNIFICANT CHANGE UP (ref 3.5–5.3)
POTASSIUM SERPL-SCNC: 4.5 MMOL/L — SIGNIFICANT CHANGE UP (ref 3.5–5.3)
PROT SERPL-MCNC: 6.2 G/DL — LOW (ref 6.6–8.7)
RBC # BLD: 2.73 M/UL — LOW (ref 3.8–5.2)
RBC # FLD: 17 % — HIGH (ref 10.3–14.5)
SODIUM SERPL-SCNC: 138 MMOL/L — SIGNIFICANT CHANGE UP (ref 135–145)
TROPONIN T, HIGH SENSITIVITY RESULT: 10 NG/L — SIGNIFICANT CHANGE UP (ref 0–51)
TROPONIN T, HIGH SENSITIVITY RESULT: 12 NG/L — SIGNIFICANT CHANGE UP (ref 0–51)
WBC # BLD: 6.59 K/UL — SIGNIFICANT CHANGE UP (ref 3.8–10.5)
WBC # FLD AUTO: 6.59 K/UL — SIGNIFICANT CHANGE UP (ref 3.8–10.5)

## 2024-02-22 PROCEDURE — 99222 1ST HOSP IP/OBS MODERATE 55: CPT

## 2024-02-22 PROCEDURE — 93010 ELECTROCARDIOGRAM REPORT: CPT

## 2024-02-22 PROCEDURE — 99285 EMERGENCY DEPT VISIT HI MDM: CPT | Mod: GC

## 2024-02-22 PROCEDURE — 71045 X-RAY EXAM CHEST 1 VIEW: CPT | Mod: 26

## 2024-02-22 PROCEDURE — 73030 X-RAY EXAM OF SHOULDER: CPT | Mod: 26,RT,76

## 2024-02-22 PROCEDURE — 99223 1ST HOSP IP/OBS HIGH 75: CPT

## 2024-02-22 RX ORDER — INSULIN LISPRO 100/ML
VIAL (ML) SUBCUTANEOUS
Refills: 0 | Status: DISCONTINUED | OUTPATIENT
Start: 2024-02-22 | End: 2024-03-02

## 2024-02-22 RX ORDER — PIOGLITAZONE HYDROCHLORIDE 15 MG/1
1 TABLET ORAL
Refills: 0 | DISCHARGE

## 2024-02-22 RX ORDER — INSULIN LISPRO 100/ML
VIAL (ML) SUBCUTANEOUS AT BEDTIME
Refills: 0 | Status: DISCONTINUED | OUTPATIENT
Start: 2024-02-22 | End: 2024-03-02

## 2024-02-22 RX ORDER — SODIUM CHLORIDE 9 MG/ML
1000 INJECTION, SOLUTION INTRAVENOUS
Refills: 0 | Status: DISCONTINUED | OUTPATIENT
Start: 2024-02-22 | End: 2024-03-02

## 2024-02-22 RX ORDER — POTASSIUM CHLORIDE 20 MEQ
1 PACKET (EA) ORAL
Refills: 0 | DISCHARGE

## 2024-02-22 RX ORDER — ARIPIPRAZOLE 15 MG/1
1 TABLET ORAL
Refills: 0 | DISCHARGE

## 2024-02-22 RX ORDER — CLONAZEPAM 1 MG
1 TABLET ORAL
Refills: 0 | DISCHARGE

## 2024-02-22 RX ORDER — DEXTROSE 50 % IN WATER 50 %
25 SYRINGE (ML) INTRAVENOUS ONCE
Refills: 0 | Status: DISCONTINUED | OUTPATIENT
Start: 2024-02-22 | End: 2024-03-02

## 2024-02-22 RX ORDER — ASPIRIN/CALCIUM CARB/MAGNESIUM 324 MG
81 TABLET ORAL
Refills: 0 | DISCHARGE

## 2024-02-22 RX ORDER — CLONAZEPAM 1 MG
0.5 TABLET ORAL AT BEDTIME
Refills: 0 | Status: DISCONTINUED | OUTPATIENT
Start: 2024-02-22 | End: 2024-02-29

## 2024-02-22 RX ORDER — METOPROLOL TARTRATE 50 MG
50 TABLET ORAL DAILY
Refills: 0 | Status: DISCONTINUED | OUTPATIENT
Start: 2024-02-22 | End: 2024-02-22

## 2024-02-22 RX ORDER — DEXTROSE 50 % IN WATER 50 %
12.5 SYRINGE (ML) INTRAVENOUS ONCE
Refills: 0 | Status: DISCONTINUED | OUTPATIENT
Start: 2024-02-22 | End: 2024-03-02

## 2024-02-22 RX ORDER — DEXTROSE 50 % IN WATER 50 %
15 SYRINGE (ML) INTRAVENOUS ONCE
Refills: 0 | Status: DISCONTINUED | OUTPATIENT
Start: 2024-02-22 | End: 2024-03-02

## 2024-02-22 RX ORDER — METOPROLOL TARTRATE 50 MG
1 TABLET ORAL
Refills: 0 | DISCHARGE

## 2024-02-22 RX ORDER — FAMOTIDINE 10 MG/ML
40 INJECTION INTRAVENOUS ONCE
Refills: 0 | Status: DISCONTINUED | OUTPATIENT
Start: 2024-02-22 | End: 2024-02-22

## 2024-02-22 RX ORDER — METOPROLOL TARTRATE 50 MG
25 TABLET ORAL DAILY
Refills: 0 | Status: DISCONTINUED | OUTPATIENT
Start: 2024-02-23 | End: 2024-02-24

## 2024-02-22 RX ORDER — FUROSEMIDE 40 MG
40 TABLET ORAL ONCE
Refills: 0 | Status: COMPLETED | OUTPATIENT
Start: 2024-02-22 | End: 2024-02-22

## 2024-02-22 RX ORDER — ARIPIPRAZOLE 15 MG/1
30 TABLET ORAL DAILY
Refills: 0 | Status: DISCONTINUED | OUTPATIENT
Start: 2024-02-22 | End: 2024-03-02

## 2024-02-22 RX ORDER — HYDROMORPHONE HYDROCHLORIDE 2 MG/ML
0.5 INJECTION INTRAMUSCULAR; INTRAVENOUS; SUBCUTANEOUS ONCE
Refills: 0 | Status: DISCONTINUED | OUTPATIENT
Start: 2024-02-22 | End: 2024-02-22

## 2024-02-22 RX ORDER — METFORMIN HYDROCHLORIDE 850 MG/1
1 TABLET ORAL
Qty: 0 | Refills: 0 | DISCHARGE

## 2024-02-22 RX ORDER — GLUCAGON INJECTION, SOLUTION 0.5 MG/.1ML
1 INJECTION, SOLUTION SUBCUTANEOUS ONCE
Refills: 0 | Status: DISCONTINUED | OUTPATIENT
Start: 2024-02-22 | End: 2024-03-02

## 2024-02-22 RX ORDER — IRON SUCROSE 20 MG/ML
200 INJECTION, SOLUTION INTRAVENOUS EVERY 24 HOURS
Refills: 0 | Status: COMPLETED | OUTPATIENT
Start: 2024-02-22 | End: 2024-02-24

## 2024-02-22 RX ORDER — FUROSEMIDE 40 MG
40 TABLET ORAL DAILY
Refills: 0 | Status: DISCONTINUED | OUTPATIENT
Start: 2024-02-22 | End: 2024-02-22

## 2024-02-22 RX ORDER — ATORVASTATIN CALCIUM 80 MG/1
1 TABLET, FILM COATED ORAL
Refills: 0 | DISCHARGE

## 2024-02-22 RX ORDER — ALBUTEROL 90 UG/1
2 AEROSOL, METERED ORAL EVERY 6 HOURS
Refills: 0 | Status: DISCONTINUED | OUTPATIENT
Start: 2024-02-22 | End: 2024-03-02

## 2024-02-22 RX ORDER — ATORVASTATIN CALCIUM 80 MG/1
40 TABLET, FILM COATED ORAL AT BEDTIME
Refills: 0 | Status: DISCONTINUED | OUTPATIENT
Start: 2024-02-22 | End: 2024-03-02

## 2024-02-22 RX ORDER — ACETAMINOPHEN 500 MG
975 TABLET ORAL EVERY 8 HOURS
Refills: 0 | Status: DISCONTINUED | OUTPATIENT
Start: 2024-02-22 | End: 2024-03-02

## 2024-02-22 RX ORDER — PANTOPRAZOLE SODIUM 20 MG/1
40 TABLET, DELAYED RELEASE ORAL EVERY 12 HOURS
Refills: 0 | Status: DISCONTINUED | OUTPATIENT
Start: 2024-02-22 | End: 2024-03-02

## 2024-02-22 RX ORDER — FUROSEMIDE 40 MG
1 TABLET ORAL
Refills: 0 | DISCHARGE

## 2024-02-22 RX ADMIN — ARIPIPRAZOLE 30 MILLIGRAM(S): 15 TABLET ORAL at 22:45

## 2024-02-22 RX ADMIN — PANTOPRAZOLE SODIUM 40 MILLIGRAM(S): 20 TABLET, DELAYED RELEASE ORAL at 18:10

## 2024-02-22 RX ADMIN — HYDROMORPHONE HYDROCHLORIDE 0.5 MILLIGRAM(S): 2 INJECTION INTRAMUSCULAR; INTRAVENOUS; SUBCUTANEOUS at 17:02

## 2024-02-22 RX ADMIN — Medication 975 MILLIGRAM(S): at 21:19

## 2024-02-22 RX ADMIN — IRON SUCROSE 200 MILLIGRAM(S): 20 INJECTION, SOLUTION INTRAVENOUS at 18:10

## 2024-02-22 RX ADMIN — Medication 0.5 MILLIGRAM(S): at 22:45

## 2024-02-22 RX ADMIN — Medication 5 MILLIGRAM(S): at 20:19

## 2024-02-22 RX ADMIN — HYDROMORPHONE HYDROCHLORIDE 0.5 MILLIGRAM(S): 2 INJECTION INTRAMUSCULAR; INTRAVENOUS; SUBCUTANEOUS at 17:54

## 2024-02-22 RX ADMIN — Medication 975 MILLIGRAM(S): at 20:19

## 2024-02-22 RX ADMIN — ATORVASTATIN CALCIUM 40 MILLIGRAM(S): 80 TABLET, FILM COATED ORAL at 22:46

## 2024-02-22 RX ADMIN — Medication 40 MILLIGRAM(S): at 10:14

## 2024-02-22 NOTE — DISCHARGE NOTE PROVIDER - NSDCFUSCHEDAPPT_GEN_ALL_CORE_FT
Ramu Hernández Physician [facility]  [address] [facility]  Scheduled Appointment: 03/20/2024

## 2024-02-22 NOTE — ED ADULT NURSE REASSESSMENT NOTE - NS ED NURSE REASSESS COMMENT FT1
Received report from ROCKY, pt came from [facility]. Pt is AxOx3, cardiac monitor and  in place, pt is sinus mike on monitor,  at 94%. PMHX of COPD. Pt is aware of plan of care.

## 2024-02-22 NOTE — H&P ADULT - HISTORY OF PRESENT ILLNESS
78 year old female with Hypertension, Dyslipidemia, Diabetes, COPD and Atrial Fibrillation on anticoagulation presents with Right arm pain and swelling and bruising. As per patient she hurt her arm  approximately 6 weeks ago and had imaging which is reported to have Xray and MRI and reported to be normal.  She does have pain in the arm and take 2 extra strength Tylenol twice daily as well as Motrin sometimes in addition to baby aspirin she takes daily. She also reports heartburn as well as 3 episodes of darkish blood in stool in the past few weeks . Denies any nausea, hematemesis, abdominal pain. Also admits to occasional lightheadedness Last colonoscopy years ago.  Denies any chest pain, dizziness, dyspnea, palpitations, Orthopnea or PND. Does get up in night to urinate. Also has mild swelling of feet.

## 2024-02-22 NOTE — CONSULT NOTE ADULT - TIME BILLING
Reviewing lab work and history in [place] to formulate the above assessment and management plan recommendations. Reviewing lab work and history in order to formulate the above assessment and management plan recommendations.

## 2024-02-22 NOTE — CONSULT NOTE ADULT - SUBJECTIVE AND OBJECTIVE BOX
Patient is a 78y old  Female who presents with a chief complaint of My arm hurts and is bruised and swollen  Anemia (22 Feb 2024 11:39)      HPI:  78 year old female former smoker with  PMH of Hypertension, HLD, Diabetes, COPD and Atrial Fibrillation (S/p A-fib ablation with Dr Crowley 6/2023) with continued episodes of paroxysmal A-Fib and remains on Xarelto, who presents to ER today with complaints of Right arm pain and increased swelling and bruising. As per patient she hurt her arm approximately 6 weeks ago and had imaging which dtr reported was an Xray and MRI and both studies were reported to be normal. Pt also reports recent few episodes of dark tarry stool in the past few weeks.  Denies any chest pain, dizziness, dyspnea, palpitations, orthopnea or PND.  B/L LE edema is noted, however Pt states her lower extremity edema is minimal compared to previously.       PAST MEDICAL & SURGICAL HISTORY:  Permanent atrial fibrillation  Hypertension  Hyperlipemia  Diabetes  COPD (chronic obstructive pulmonary disease)  Former smoker  H/O: hysterectomy  H/O lumpectomy          PREVIOUS DIAGNOSTIC TESTING:      ECHO  FINDINGS:  2/28/2023 Atrial Fibrillation, Normal LVEF 55-60%, Mild-Moderate MR, Mild TR    CARDIAC CATHETERIZATION  FINDINGS: 3/2023 LHC shows Normal LVEF 55%, Mild to moderate stenosis of the MID LAD and OM3, No AS, Very Mild MR      Allergies  penicillins (Unknown)  doxycycline (Unknown)            MEDICATIONS  (HOME):  · 	Xarelto 20 mg oral tablet: 20 milligram(s) orally once a day  · 	enalapril 5 mg oral tablet: 1 tab(s) orally 2 times a day  · 	DilTIAZem Hydrochloride  mg/24 hours oral capsule, extended release: 1 tab(s) orally once a day  · 	furosemide 20 mg oral tablet: 1 orally once a day  · 	Metoprolol Succinate ER 50 mg oral tablet, extended release: 1 orally once a day  · 	aspirin 81 mg oral tablet: 81 milligram(s) orally once a day  · 	clonazePAM 0.5 mg oral tablet: 1 tab(s) orally once a day  · 	potassium chloride 10 mEq oral capsule, extended release: 1 orally once a day  · 	metFORMIN 500 mg oral tablet: 1 tab(s) orally once a day Hold 48 hours post procedure  · 	atorvastatin 40 mg oral tablet: 1 tab(s) orally once a day  · 	AMiodarone 200 mg PO daily         pioglitazone 15 mg oral tablet: 1 tab(s) orally once a day  · 	Abilify 30 mg oral tablet: 1 orally once a day        FAMILY HISTORY:  FH: heart disease (Father)    CIGARETTES: FORMER SMOKER    ALCOHOL: DENIES     REVIEW OF SYSTEMS:  CONSTITUTIONAL: No fever, weight loss, or fatigue  EYES: No eye pain, visual disturbances, or discharge  ENMT:  No difficulty hearing, tinnitus, vertigo; No sinus or throat pain  NECK: No pain or stiffness  RESPIRATORY: No cough, Mild exp wheeze, No chills or hemoptysis; No Shortness of Breath  CARDIOVASCULAR: No chest pain, palpitations, passing out, or dizziness, Moderate B/L LE leg swelling  GASTROINTESTINAL: No abdominal or epigastric pain. No nausea, vomiting, or hematemesis; No diarrhea or constipation.  GENITOURINARY: No dysuria, frequency, hematuria, or incontinence  NEUROLOGICAL: No headaches, memory loss, loss of strength, numbness, or tremors  SKIN: No itching, burning, rashes, or lesions   ENDOCRINE: No heat or cold intolerance; No hair loss  MUSCULOSKELETAL: + Right arm and shoulder pain and swelling; No muscle, back, or extremity pain  PSYCHIATRIC: No depression, anxiety, mood swings, or difficulty sleeping  HEME/LYMPH: No easy bruising, or bleeding gums  ALLERY AND IMMUNOLOGIC: No hives or eczema	    Vital Signs Last 24 Hrs  T(C): --  T(F): --  HR: 46 (22 Feb 2024 10:00) (46 - 46)  BP: 117/52 (22 Feb 2024 10:00) (117/52 - 122/86)  BP(mean): --  RR: 20 (22 Feb 2024 10:00) (20 - 24)  SpO2: 94% (22 Feb 2024 10:00) (94% - 94%)    Parameters below as of 22 Feb 2024 10:00  Patient On (Oxygen Delivery Method): room air        Daily Height in cm: 160.02 (22 Feb 2024 09:55)    Daily           PHYSICAL EXAM:  Appearance: Normal, well nourished	  HEENT:   Normal oral mucosa, PERRL, EOMI, sclera non-icteric	  Cardiovascular: Normal S1 S2, No JVD, No cardiac murmurs, No carotid bruits, Moderate B/L LE edema  Respiratory: Lungs diminished B/L bases few mild expiratory wheezes	  Psychiatry: A & O x 3, Mood & affect appropriate  Gastrointestinal:  Soft, Non-tender, + BS, no bruits	  Skin: No rashes, + R UE ecchymoses, No cyanosis  Neurologic: Grossly non-focal  Extremities: R UE weakness, swelling and pain,  No clubbing, or cyanosis, Moderate B/L LE edema        INTERPRETATION OF TELEMETRY: Sinus Bradycardia HR 40's    ECG:  Abnormal, Sinus Bradycardia, cannot rule out old anterolateral infarct, non-specific ST- T wave abnormalities          LABS:                        7.6    6.59  )-----------( 253      ( 22 Feb 2024 10:00 )             24.3     02-22    138  |  101  |  27.2<H>  ----------------------------<  98  4.5   |  25.0  |  0.95    Ca    8.7      22 Feb 2024 10:00  Mg     2.1     02-22    TPro  6.2<L>  /  Alb  3.5  /  TBili  1.0  /  DBili  x   /  AST  24  /  ALT  20  /  AlkPhos  162<H>  02-22          Urinalysis Basic - ( 22 Feb 2024 10:00 )    Color: x / Appearance: x / SG: x / pH: x  Gluc: 98 mg/dL / Ketone: x  / Bili: x / Urobili: x   Blood: x / Protein: x / Nitrite: x   Leuk Esterase: x / RBC: x / WBC x   Sq Epi: x / Non Sq Epi: x / Bacteria: x      BNP: 328    RADIOLOGY  ACC: [id number] EXAM:  XR CHEST PORTABLE URGENT 1V   ORDERED BY: AMY HARDIN   PROCEDURE DATE:  02/22/2024    INTERPRETATION:  EXAM: XR CHEST URGENT    INDICATION: Chest Pain XXR    COMPARISON: CT May 12, 2023    IMPRESSION: Increased perihilar interstitial markings bilaterally. An   element of fluid overload should be considered. The nodular density seen   on previous CT not obvious on this chest x-ray. Follow-up correlation   with repeat CT suggested as indicated clinically previously.  Anterior inferior dislocation of the right humeral head from the glenoid   noted.  Borderline cardiomegaly.      Assessment:  78 year old female former smoker with  PMH of Hypertension, HLD, Diabetes, COPD and Atrial Fibrillation (S/p A-fib ablation with Dr Crowley 6/2023) with continued episodes of paroxysmal A-Fib and remains on Xarelto, who presents to ER today with complaints of Right arm pain and increased swelling and bruising. As per patient she hurt her arm approximately 6 weeks ago and had imaging which dtr reported was an Xray and MRI and both studies were reported to be normal. Pt also reports recent few episodes of dark tarry stool in the past few weeks.  Denies any chest pain, dizziness, dyspnea, palpitations, orthopnea or PND.  B/L LE edema is noted, however Pt states her lower extremity edema is minimal compared to previously.     Office Echo 2/28/2023 shows Atrial Fibrillation, Normal LVEF 55-60%, Mild-Moderate MR, Mild TR  LHC 3/2023  shows Normal LVEF 55%, Mild to moderate stenosis of the MID LAD and OM3, No AS, Very Mild MR    Pt seen today in ER sitting bedside in wheelchair awake, alert and oriented, Denies chest pain and SOB.  Complains of RUE swelling and pain that is worsening after a fall few weeks ago.  Upon ER work up Pt found to be anemic H/H 7.6/24.3, pt states few intermittent dark stools over the past few weeks, denies BRBPR.  ECG shows Sinus Bradycardia, cannot rule out old anterolateral infarct, non-specific ST- T wave abnormalities, Currently SB on CM HR 40's denies dizziness.  BP stable. Troponin negative x 2, BNP very minimally elevated at 328, CXR- No fluid overload.  O2 Sat's 96% on RA.       Plan:  Bradycardia/ Anemia/ R/o GI Bleed  Discontinue Cardizem  Hold Amiodarone for now  Hold Lasix (not fluid overloaded)  Decrease Toprol XL 25 mg PO daily if HR > 70 and SBP > 110  Ok to hold Xarelto pending GI work up  Continue Enalapril 5 mg PO BID for SBP > 120  Continue Atorvastatin 40mg PO nightly    No contraindication from a cardiac standpoint for EGD/ Colonoscopy

## 2024-02-22 NOTE — DISCHARGE NOTE PROVIDER - CARE PROVIDERS DIRECT ADDRESSES
,[email or web address]

## 2024-02-22 NOTE — DISCHARGE NOTE PROVIDER - NSDCFUADDINST_GEN_ALL_CORE_FT
ORTHO: Non weight bearing Right shoulder. Follow-up outpatient with Dr. Hernández. [place] for comfort.  ORTHO: Weight bearing as tolerated for Right upper extremity. Follow-up outpatient with Dr. Hernández. [place] for comfort.

## 2024-02-22 NOTE — ED ADULT NURSE NOTE - OBJECTIVE STATEMENT
A&Ox4, RR even and unlabored on RA. skin is warm and dry. large amount of bruising noted to right chest wall that appears old. PT C/O swelling to lower extremities and right arm for the past week. Pain to right hand and back. +lightheadedness.  PT state she recently was put on a lower dose of her diuretics at home. PT reports a fall a month ago. Hx of afib and is on Xarelto. ON CM in found to be bradycardic to the 40s.

## 2024-02-22 NOTE — H&P ADULT - NSHPLABSRESULTS_GEN_ALL_CORE
Personally reviewed   EKG with bradycardia  Chest X-Ray with pulmonary congestion and displace right humerus

## 2024-02-22 NOTE — CONSULT NOTE ADULT - SUBJECTIVE AND OBJECTIVE BOX
Chief Complaint:  Patient is a 78y old  Female who presents with a chief complaint of edema to bilateral upper and lower extremities       HPI: 78-year-old female who is a poor historian with history of HTN, HLD, DM, COPD (no home oxygen), former smoker, atrial fibrillation on Xarelto presenting with increased edema to bilateral upper and lower extremities. Patient states that she was told to stop taking furosemide with last dose 4 days ago. GI consulted for anemia. Patient is poor historian. Patient with multiple falls, most recent fall 4 weeks ago and was evaluated at another hospital. Denies any falls or traumatic injuries afterwards. Daughter attributes falls to patient's poor mobility and imbalance, while patient reports she has had episodes of dizziness. Patient reports she takes ibuprofen at least twice daily for pain in her right arm. Patient reports she has seen bright red blood in her stool twice in the past month. Last EGD/colonoscopy more than 10 years ago, unsure of results. Quit smoking about 15 years ago, denies drinking and alcohol use. Hemoglobin 7.6 on admission, was 10.4 last May. Denies chest pain, palpitations, nausea, vomiting, abdominal pain, diarrhea, constipation, hematemesis, and melena.     PAST MEDICAL & SURGICAL HISTORY:  Permanent atrial fibrillation      Hypertension      Hyperlipemia      Diabetes      COPD (chronic obstructive pulmonary disease)      Former smoker      H/O: hysterectomy      H/O lumpectomy          REVIEW OF SYSTEMS:   General: Negative  HEENT: Negative  CV: Negative  Respiratory: Negative  GI: See HPI  : Negative  MSK: Negative  Hematologic: Negative  Skin: Negative    MEDICATIONS:   MEDICATIONS  (STANDING):  dextrose 5%. 1000 milliLiter(s) (50 mL/Hr) IV Continuous <Continuous>  dextrose 5%. 1000 milliLiter(s) (100 mL/Hr) IV Continuous <Continuous>  dextrose 50% Injectable 25 Gram(s) IV Push once  dextrose 50% Injectable 12.5 Gram(s) IV Push once  dextrose 50% Injectable 25 Gram(s) IV Push once  glucagon  Injectable 1 milliGRAM(s) IntraMuscular once  insulin lispro (ADMELOG) corrective regimen sliding scale   SubCutaneous three times a day before meals  insulin lispro (ADMELOG) corrective regimen sliding scale   SubCutaneous at bedtime  iron sucrose Injectable 200 milliGRAM(s) IV Push every 24 hours  pantoprazole  Injectable 40 milliGRAM(s) IV Push every 12 hours    MEDICATIONS  (PRN):  dextrose Oral Gel 15 Gram(s) Oral once PRN Blood Glucose LESS THAN 70 milliGRAM(s)/deciliter          DIET:  Diet, DASH/TLC:   Sodium & Cholesterol Restricted  Consistent Carbohydrate Evening Snack (CSTCHOSN)  1200mL Fluid Restriction ([id number]) (02-22-24 @ 12:01) [Active]          ALLERGIES:   Allergies    penicillins (Unknown)  doxycycline (Unknown)    Intolerances        Substance Use:   (  ) never used  (  ) other:  Tobacco Usage:  (   ) never smoked   (   ) former smoker   (   ) current smoker  (     ) pack year  (        ) last cigarette date  Alcohol Usage:    Family History   IBD (  ) Yes   (  ) No  GI Malignancy (  )  Yes    (  ) No    Health Management  Last Colonoscopy:  Last Endoscopy:     VITAL SIGNS:   Vital Signs Last 24 Hrs  T(C): --  T(F): --  HR: 46 (22 Feb 2024 10:00) (46 - 46)  BP: 117/52 (22 Feb 2024 10:00) (117/52 - 122/86)  BP(mean): --  RR: 20 (22 Feb 2024 10:00) (20 - 24)  SpO2: 94% (22 Feb 2024 10:00) (94% - 94%)    Parameters below as of 22 Feb 2024 10:00  Patient On (Oxygen Delivery Method): room air      I&O's Summary      PHYSICAL EXAM:   GENERAL:  No acute distress  HEENT:  NC/AT, conjunctiva clear, sclera anicteric  CHEST:  diminished breath sounds  HEART:  bradycardic   ABDOMEN:  Soft, obese non-tender, non-distended, normoactive bowel sounds, no rebound or guarding  EXTREMITIES: + edema bilateral upper and lower extremities  SKIN:  Warm, dry  NEURO:  Calm, cooperative    LABS:                        7.6    6.59  )-----------( 253      ( 22 Feb 2024 10:00 )             24.3     Hemoglobin: 7.6 g/dL (02-22-24 @ 10:00)    02-22    138  |  101  |  27.2<H>  ----------------------------<  98  4.5   |  25.0  |  0.95    Ca    8.7      22 Feb 2024 10:00  Mg     2.1     02-22    TPro  6.2<L>  /  Alb  3.5  /  TBili  1.0  /  DBili  x   /  AST  24  /  ALT  20  /  AlkPhos  162<H>  02-22    LIVER FUNCTIONS - ( 22 Feb 2024 10:00 )  Alb: 3.5 g/dL / Pro: 6.2 g/dL / ALK PHOS: 162 U/L / ALT: 20 U/L / AST: 24 U/L / GGT: x         RADIOLOGY & ADDITIONAL STUDIES:         Chief Complaint:  Patient is a 78y old  Female who presents with a chief complaint of edema to bilateral upper and lower extremities       HPI: 78-year-old female who is a poor historian with history of HTN, HLD, DM, COPD (no home oxygen), former smoker, atrial fibrillation on Xarelto presenting with increased edema to bilateral upper and lower extremities. Patient states that she was told to stop taking furosemide with last dose 4 days ago. GI consulted for anemia. Patient is poor historian. Patient with multiple falls, most recent fall 4 weeks ago and was evaluated at another hospital. Denies any falls or traumatic injuries afterwards. Daughter attributes falls to patient's poor mobility and imbalance, while patient reports she has had episodes of dizziness. Patient reports she takes ibuprofen at least twice daily for pain in her right arm. CXR in ED Anterior inferior dislocation of the right humeral head from the glenoid noted. Patient reports she has seen bright red blood in her stool twice in the past month. Last EGD/colonoscopy more than 10 years ago, unsure of results. Quit smoking about 15 years ago, denies drinking and alcohol use. Hemoglobin 7.6 on admission, was 10.4 last May. Denies chest pain, palpitations, nausea, vomiting, abdominal pain, diarrhea, constipation, hematemesis, and melena.     PAST MEDICAL & SURGICAL HISTORY:  Permanent atrial fibrillation      Hypertension      Hyperlipemia      Diabetes      COPD (chronic obstructive pulmonary disease)      Former smoker      H/O: hysterectomy      H/O lumpectomy          REVIEW OF SYSTEMS:   General: Negative  HEENT: Negative  CV: Negative  Respiratory: Negative  GI: See HPI  : Negative  MSK: Negative  Hematologic: Negative  Skin: Negative    MEDICATIONS:   MEDICATIONS  (STANDING):  dextrose 5%. 1000 milliLiter(s) (50 mL/Hr) IV Continuous <Continuous>  dextrose 5%. 1000 milliLiter(s) (100 mL/Hr) IV Continuous <Continuous>  dextrose 50% Injectable 25 Gram(s) IV Push once  dextrose 50% Injectable 12.5 Gram(s) IV Push once  dextrose 50% Injectable 25 Gram(s) IV Push once  glucagon  Injectable 1 milliGRAM(s) IntraMuscular once  insulin lispro (ADMELOG) corrective regimen sliding scale   SubCutaneous three times a day before meals  insulin lispro (ADMELOG) corrective regimen sliding scale   SubCutaneous at bedtime  iron sucrose Injectable 200 milliGRAM(s) IV Push every 24 hours  pantoprazole  Injectable 40 milliGRAM(s) IV Push every 12 hours    MEDICATIONS  (PRN):  dextrose Oral Gel 15 Gram(s) Oral once PRN Blood Glucose LESS THAN 70 milliGRAM(s)/deciliter          DIET:  Diet, DASH/TLC:   Sodium & Cholesterol Restricted  Consistent Carbohydrate Evening Snack (CSTCHOSN)  1200mL Fluid Restriction ([id number]) (02-22-24 @ 12:01) [Active]          ALLERGIES:   Allergies    penicillins (Unknown)  doxycycline (Unknown)    Intolerances        Substance Use:   (  ) never used  (  ) other:  Tobacco Usage:  (   ) never smoked   (   ) former smoker   (   ) current smoker  (     ) pack year  (        ) last cigarette date  Alcohol Usage:    Family History   IBD (  ) Yes   (  ) No  GI Malignancy (  )  Yes    (  ) No    Health Management  Last Colonoscopy:  Last Endoscopy:     VITAL SIGNS:   Vital Signs Last 24 Hrs  T(C): --  T(F): --  HR: 46 (22 Feb 2024 10:00) (46 - 46)  BP: 117/52 (22 Feb 2024 10:00) (117/52 - 122/86)  BP(mean): --  RR: 20 (22 Feb 2024 10:00) (20 - 24)  SpO2: 94% (22 Feb 2024 10:00) (94% - 94%)    Parameters below as of 22 Feb 2024 10:00  Patient On (Oxygen Delivery Method): room air      I&O's Summary      PHYSICAL EXAM:   GENERAL:  No acute distress  HEENT:  NC/AT, conjunctiva clear, sclera anicteric  CHEST:  diminished breath sounds  HEART:  bradycardic   ABDOMEN:  Soft, obese non-tender, non-distended, normoactive bowel sounds, no rebound or guarding  EXTREMITIES: + edema bilateral upper and lower extremities  SKIN:  Warm, dry  NEURO:  Calm, cooperative    LABS:                        7.6    6.59  )-----------( 253      ( 22 Feb 2024 10:00 )             24.3     Hemoglobin: 7.6 g/dL (02-22-24 @ 10:00)    02-22    138  |  101  |  27.2<H>  ----------------------------<  98  4.5   |  25.0  |  0.95    Ca    8.7      22 Feb 2024 10:00  Mg     2.1     02-22    TPro  6.2<L>  /  Alb  3.5  /  TBili  1.0  /  DBili  x   /  AST  24  /  ALT  20  /  AlkPhos  162<H>  02-22    LIVER FUNCTIONS - ( 22 Feb 2024 10:00 )  Alb: 3.5 g/dL / Pro: 6.2 g/dL / ALK PHOS: 162 U/L / ALT: 20 U/L / AST: 24 U/L / GGT: x         RADIOLOGY & ADDITIONAL STUDIES:  < from: Xray Chest 1 View- PORTABLE-Urgent (02.22.24 @ 10:24) >    ACC: [id number] EXAM:  XR CHEST PORTABLE URGENT 1V   ORDERED BY: AMY HARDIN     PROCEDURE DATE:  02/22/2024          INTERPRETATION:  EXAM: XR CHEST URGENT    INDICATION: Chest Pain XXR    COMPARISON: CT May 12, 2023    IMPRESSION: Increased perihilar interstitial markings bilaterally. An   element of fluid overload should be considered. The nodular density seen   on previous CT not obvious on this chest x-ray. Follow-up correlation   with repeat CT suggested as indicated clinically previously.    Anterior inferior dislocation of the right humeral head from the glenoid   noted.    Borderline cardiomegaly.    --- End of Report ---            CHAD WALSH MD; Attending Radiologist    < end of copied text >

## 2024-02-22 NOTE — H&P ADULT - ASSESSMENT
78 year old female with Hypertension, Dyslipidemia, Diabetes, Paroxysmal Atrial Fibrillation and COPD presented with     Anemia (Hgb 7.6), Elevated   Chest X-Ray with increased interstitial marking  EKG Sinus bradycardia    # Acute CHF, unknown EF  # Bradycardia  # Paroxysmal Atrial Fibrillation  - Admit to telemetry  - I/O, daily weight, salt and fluid restriction  - Furosemide 40mg IV daily  - BB with parameters due to bradycardia  - Hold CCB and Amiodarone  due to bradycardia  - Rivaroxaban  - Consider ARNi, MRA if BP tolerates  - Consider SGLT2 upon discharge  - TTE  - Cardiology evaluation    # T2DM  - Hold oral hypoglycemic agent  - Blood glucose monitoring with sliding scale Lispro  - A1c in am 78 year old female with Hypertension, Dyslipidemia, Diabetes, Paroxysmal Atrial Fibrillation and COPD presented with     Anemia (Hgb 7.6), Elevated   Chest X-Ray with increased interstitial marking  EKG Sinus bradycardia    # Acute CHF, unknown EF  # Bradycardia  # Paroxysmal Atrial Fibrillation  - Admit to telemetry  - I/O, daily weight, salt and fluid restriction  - Furosemide 40mg IV daily  - BB with parameters due to bradycardia  - Hold CCB and Amiodarone  due to bradycardia  - Rivaroxaban  - Consider ARNi, MRA if BP tolerates  - Consider SGLT2 upon discharge  - TTE  - Cardiology evaluation    # T2DM  - Hold oral hypoglycemic agent  - Blood glucose monitoring with sliding scale Lispro  - A1c in am    # Dyslipidemia  - Statin  - FLP in am    # COPD    VTE prophylaxis     78 year old female with Hypertension, Dyslipidemia, Diabetes, COPD and Atrial Fibrillation on anticoagulation presents with Right arm pain and swelling and bruising. A  Anemia (Hgb 7.6), Elevated   Chest X-Ray with increased interstitial marking  EKG Sinus bradycardia    # Anemia, likely due to upper GI loss  # Iron deficiency  # GERD   - Admit to telemetry given bradycardia  - Diet for now, NPO after midnight for EGD in am  - Hod Rivaroxaban for now.  - No NSAIDS  - Pantoprazole 40mg IV q12  - IV Iron  - Monitor Hgb, transfuse PRBC if <7 or symptoms  - GI for procedures    # Bradycardia  # Paroxysmal Atrial Fibrillation  # Chronic CHF ?EF  - Admit to telemetry  - I/O, daily weight, salt and fluid restriction  - Furosemide 40mg IV daily. Hold off Triamterene for now  - BB with parameters due to bradycardia  - Hold CCB and Amiodarone  due to bradycardia  - Rivaroxaban on hold due to anemia  - Consider ARNi, MRA if BP tolerates  - Consider SGLT2 upon discharge  - TTE  - Cardiology evaluation    # Dislocation, Right Shoulder  - Acetaminophen 975mg q8  - ER to attempt traction and call Orthopedics as needed    # T2DM  - Hold oral hypoglycemic agent  - Blood glucose monitoring with sliding scale Lispro  - A1c in am    # Dyslipidemia  - Statin  - FLP in am    # COPD  - PRN MING  VTE prophylaxis    Discussed with patient and daughter at bedside  Discussed with GI   78 year old female with Hypertension, Dyslipidemia, Diabetes, COPD and Atrial Fibrillation on anticoagulation presents with Right arm pain and swelling and bruising. Daily ASA, Xarelto and now with Motrin for right shoulder pain  Anemia (Hgb 7.6), Elevated . Chest X-Ray with increased interstitial marking as well as inferior dislocation of right shoulder. EKG Sinus bradycardia    # Anemia, likely due to slow ongoing upper GI loss  # Iron deficiency  # GERD   - Admit to telemetry given bradycardia  - Diet for now, NPO after midnight for EGD in am  - Hod Rivaroxaban and ASA for now.  - No NSAIDS  - Pantoprazole 40mg IV q12  - IV Iron  - Monitor Hgb, transfuse PRBC if <7 or symptoms  - GI for procedures    # Bradycardia  # Paroxysmal Atrial Fibrillation  # Chronic CHF ?EF  - Admit to telemetry  - I/O, daily weight, salt and fluid restriction  - Furosemide 40mg PO daily. Hold off Triamterene for now  - BB with parameters due to bradycardia  - Hold CCB and Amiodarone  due to bradycardia  - Rivaroxaban on hold due to anemia  - Consider ARNi, MRA if BP tolerates  - Consider SGLT2 upon discharge  - TTE  - Cardiology evaluation    # Dislocation, Right Shoulder  - Acetaminophen 975mg q8  - ER to attempt traction and call Orthopedics as needed    # T2DM  - Hold oral hypoglycemic agent  - Blood glucose monitoring with sliding scale Lispro  - A1c in am    # Dyslipidemia  - Statin  - FLP in am    # COPD  - PRN MING  VTE prophylaxis  - VCD    Discussed with patient and daughter at bedside  Discussed with GI

## 2024-02-22 NOTE — ED PROVIDER NOTE - ATTENDING CONTRIBUTION TO CARE
Pt with swelling in arms and legs that started 4 d ago after stopping furosemide.  no other complaints.    physical - mike regular. ctab. abd - soft, nt. edema x 4.     plan - labs and imaging reviewed. pt with acute on chronic HF.  case d/w dr. patel and recommends admission for diuresis.

## 2024-02-22 NOTE — DISCHARGE NOTE PROVIDER - ATTENDING DISCHARGE PHYSICAL EXAMINATION:
aaox3 nad   rrr s1s2  ctab  soft abdomen  no LE edema  nonfocal neuro  unable to raise right shoulder 2/2 pain  d/w daughter over the phone

## 2024-02-22 NOTE — CONSULT NOTE ADULT - NS ATTEND AMEND GEN_ALL_CORE FT
Orthopaedic Surgery Attending Addendum:    I have personally performed a face-to-face diagnostic evaluation on this patient.  I have reviewed the physician assistant note and agree with the history, exam, and plan of care, except as noted.    On February 29 evening at 5 PM I had a thorough conversation together with patient and family member Nu.  We discussed clinical exam findings as well as history and imaging findings.  It is concerning to see the CT scan findings with tremendous remodeling of the humeral head with flattening and a Hill-Sachs that appears to be chronic as well as remodeling of the glenoid socket.  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  We have also discussed the significant calcification throughout the soft tissue surrounding the capsular aspect of the joint and the deltoid and surrounding tissues which also contributes to the chronic aspect and the likely risk of failure to maintain reduction despite closed versus open reduction.    Furthermore, at the time of visit the patient currently has multiple medical comorbidities including morbid obesity, diabetes, hx of anemia and blood transfusions for which family member mentions endoscopy and colonoscopy are currently being performed to further assess this condition.  She is currently on oxygen in the hospital.  With this in mind we had a thorough conversation regarding the risks and benefits of proceeding with any closer open and management.  The likelihood of fracturing the humeral head as well as the glenoid and other issues that may require open management of the shoulder and in the likelihood of uncontrolled and/or history of diabetes it is concerning to bring the patient to the operating room.  At this point in time I have had a thorough discussion about this case together with my trauma partner here at [place] as well as an upper extremity colleague Dr. Houser in the [facility] system.  At this point the providers all agree that close reduction in the setting is likely to lead to failure and or other complications.  The patient and family member verbalized understanding of the above.  As such we will continue with conservative management in this scenario as it is likely the safest course of action.    Kris Toledo MD  Hand & Upper Extremity Surgery  Orthopedic Trauma Surgery  [facility]
I evaluated this pt. with my ACP and agree with the above assessment and management plan. Pt with symptomatic anemia and dark stools with remote colonoscopy more than 10 years ago and possibly a prior EGD done around that time although the pt. is uncertain if she ever had an EGD. Would transfuse to Hb of 8 grams or higher. No evidence of active GI bleeding presently. Cardiac clearance needed for possible EGD tomorrow. Pt. can eat today and then NPO after MN tonight for possible EGD tomorrow. IV Pantoprazole for GI mucosal cytoprotection ordered. Pt. would be a poor candidate for colonoscopy given her immobility which would make it very difficult for her to adequately prep for colonoscopy. If EGD findings are significant enough to account for her anemia we will not need to consider proceeding with colonoscopy. Repeat labs ordered for the AM.

## 2024-02-22 NOTE — DISCHARGE NOTE PROVIDER - NS AS DC PROVIDER CONTACT Y/N MULTI
Progress Note - Infectious Disease   Elba Tomas 70 y o  male MRN: [id number]  Unit/Bed#: S -01 Encounter: [id number]      Impression/Plan:  1  Systemic inflammatory response syndrome-POA   Tachycardia and leukocytosis   Suspect multifactorial including small-bowel obstruction and probable aspiration pneumonitis   Consideration for the possibility of aspiration pneumonia   Fortunately the patient remains hemodynamically stable despite his systemic illness   He seems to be tolerating the antibiotics without difficulty   Patient has completed 5 days of antibiotics in the procalcitonin level has decreased substantially  He now remains stable off all antibiotics for the past 72 hours  -monitor off all antibiotics  -recheck CBC diff  -follow-up blood cultures  -treatment of small-bowel obstruction  -supportive care     2  Acute hypoxic respiratory failure-in the setting of vomiting and aspiration events   Suspect patient has aspiration pneumonitis   Consideration for the possibility of developing aspiration pneumonia   White cell count has significantly decreased since yesterday and his oxygenation status has remained stable   Procalcitonin level is elevated but has decreased substantially  Patient completed 5 days of ertapenem  -no additional antibiotics  -monitor respiratory status  -supportive care     3  Small-bowel obstruction-recurrent   Patient has had an NG-tube placed   His abdomen is firm but does not appear to be tender based upon his responses to the exam   X-ray still with dilated loops of bowel  NG tube is now been removed  -serial abdominal exams  -patient for upper GI  -no surgical intervention as per the wife's wishes   -outpatient CT enterography  -GI follow-up     4  Acute kidney injury-in a patient with suprapubic catheter in place   No urinary obstruction seen on imaging   Suspect this is a pre renal issue   Consideration for the possibility of ATN    Renal function is much improved  -volume management  -recheck BMP  -dose adjust antibiotics as needed     5  Advanced dementia-with global aphasia         6  Multiple antibiotic allergies-not listed but wife insists the patient also is allergic to linezolid so this is been discontinued  Stephanie Lao continue monitor for any intolerance of antibiotics     7  Gram-positive bacteremia-appears to be contaminant with 2 organisms isolated in 1 set   No additional workup or treatment needed for now    Discussed the above management plan with the primary service    No active infectious disease issues  We will sign off  Please call if questions  Antibiotics:  Off antibiotics 3    Subjective:  Patient resting comfortably  Remains nonverbal   His NG tube has been removed  He appears comfortable    Objective:  Vitals:  Temp:  [98 4 °F (36 9 °C)-98 9 °F (37 2 °C)] 98 9 °F (37 2 °C)  HR:  [59-69] 59  Resp:  [18] 18  BP: (148-159)/(68-79) 159/77  SpO2:  [93 %-94 %] 94 %  Temp (24hrs), Av 7 °F (37 1 °C), Min:98 4 °F (36 9 °C), Max:98 9 °F (37 2 °C)  Current: Temperature: 98 9 °F (37 2 °C)    Physical Exam:   General Appearance:  Somnolent, nontoxic, no acute distress  Throat: Oropharynx moist without lesions  Lungs:   Clear to auscultation bilaterally; no wheezes, rhonchi or rales; respirations unlabored   Heart:  RRR; no murmur, rub or gallop   Abdomen:   Soft, non-tender, non-distended, positive bowel sounds  Extremities: No clubbing, cyanosis or edema   Skin: No new rashes or lesions  No draining wounds noted         Labs, Imaging, & Other studies:   All pertinent labs and imaging studies were personally reviewed  Results from last 7 days   Lab Units 22  0446 22  1024 22  0000   WBC Thousand/uL 7 41 8 65 7 61   HEMOGLOBIN g/dL 10 3* 10 2* 9 4*   PLATELETS Thousands/uL 300 299 275     Results from last 7 days   Lab Units 22  0446 22  0446 22  0446 22  1134 22  1134 22  0416 22  0554 02/15/22  0611 02/15/22  0611   SODIUM mmol/L 140  --  138  --  135*   < > 143   < > 148*   POTASSIUM mmol/L 3 2*   < > 3 0*   < > 4 5   < > 3 3*   < > 3 3*   CHLORIDE mmol/L 108   < > 105   < > 106   < > 110*   < > 111*   CO2 mmol/L 25   < > 20*   < > 15*   < > 24   < > 26   BUN mg/dL 5   < > 7   < > 9   < > 23   < > 33*   CREATININE mg/dL 1 31*   < > 1 25   < > 1 21   < > 1 66*   < > 2 17*   EGFR ml/min/1 73sq m 53   < > 56   < > 59   < > 40   < > 29   CALCIUM mg/dL 8 4   < > 8 2*   < > 8 2*   < > 8 0*   < > 8 7   AST U/L  --   --  10  --   --   --  13  --  11   ALT U/L  --   --  10*  --   --   --  10*   < > 11*   ALK PHOS U/L  --   --  71  --   --   --  59   < > 70    < > = values in this interval not displayed  Results from last 7 days   Lab Units 02/14/22  1743 02/14/22  1524 02/14/22  1257 02/14/22  1254   BLOOD CULTURE   --   --  Staphylococcus coagulase negative*  Diphtheroids No Growth After 5 Days     GRAM STAIN RESULT   --   --  Gram positive cocci in clusters*  Gram positive rods*  --    URINE CULTURE   --  >100,000 cfu/ml Proteus mirabilis*  >100,000 cfu/ml Klebsiella-Enterobacter  group*  >100,000 cfu/ml Staphylococcus species*  --   --    MRSA CULTURE ONLY  No Methicillin Resistant Staphlyococcus aureus (MRSA) isolated  --   --   --      Results from last 7 days   Lab Units 02/18/22  0000 02/16/22  0554 02/15/22  0955   PROCALCITONIN ng/ml 2 20* 11 76* 19 41* Yes

## 2024-02-22 NOTE — DISCHARGE NOTE PROVIDER - HOSPITAL COURSE
78 yr old female with hypertension, hyperlipidemia, diabetes mellitus, COPD, atrial fibrillation on Xarelto, KAJAL on CPAP, schizophrenia presented with complaints of right arm pain and swelling for few weeks after hurting herself at home, ? fall. Also noted to be on Motrin intermittently for pain. Labs revealed Hb 7.6, . She endorsed symptoms of heartburn, dark stools. She was noted to have bradycardia and shoulder imaging revealed Medial right shoulder dislocation. Xarelto and aspirin were held, cardiology, GI and orthopedics consulted. Conscious sedation could not be obtained in ED for reduction, NWB recommended by orthopedics. She was also noted to have bradycardia, hence Amiodarone and calcium channel blockers were held. EGD recommended by GI, she was cleared by cardiology, TTE was ordered. She was given a unit of PRBC. She was noted to have iron deficiency anemia. EGD was done, revealed non erosive gastritis, colonoscopy with moderate diverticulosis and hemorrhoids. CT enterography was recommended by GI. Her CBC remained stable. Orthopedics recommended close vs open reduction for shoulder dislocation. Ortho later recommended non opreative management for now and otpatient follow up. CTE without source of bleeding. PT and OT were consulted. 78 yr old female with hypertension, hyperlipidemia, diabetes mellitus, COPD, atrial fibrillation on Xarelto, KAJAL on CPAP, schizophrenia presented with complaints of right arm pain and swelling for few weeks after hurting herself at home, ? fall. Also noted to be on Motrin intermittently for pain. Labs revealed Hb 7.6, . She endorsed symptoms of heartburn, dark stools. She was noted to have bradycardia and shoulder imaging revealed Medial right shoulder dislocation. Xarelto and aspirin were held, cardiology, GI and orthopedics consulted. Conscious sedation could not be obtained in ED for reduction, NWB recommended by orthopedics. She was also noted to have bradycardia, hence Amiodarone and calcium channel blockers were held. EGD recommended by GI, she was cleared by cardiology, TTE was ordered. She was given a unit of PRBC. She was noted to have iron deficiency anemia. EGD was done, revealed non erosive gastritis, colonoscopy with moderate diverticulosis and hemorrhoids. CT enterography was negative. Her CBC remained stable. Orthopedics recommended close vs open reduction for shoulder dislocation. Ortho later recommended non operative management for now and outpatient follow up.      stable for MA home.

## 2024-02-22 NOTE — DISCHARGE NOTE PROVIDER - CARE PROVIDER_API CALL
Panchito Marino  Orthopaedic Surgery  [address] [place] [address]  Phone: [phone]  Fax: [phone]  Follow Up Time:     Kiran Yang  Interventional Cardiology  [address], [address]  [place] [address]  Phone: [phone]  Fax: [phone]  Follow Up Time:     Wally Coffey  Pulmonary Disease  [address], [address]  [place] [address]  Phone: [phone]  Fax: [phone]  Follow Up Time:

## 2024-02-22 NOTE — ED PROVIDER NOTE - CLINICAL SUMMARY MEDICAL DECISION MAKING FREE TEXT BOX
78-year-old female with increased swelling to lower and upper extremities in the setting of discontinuation of furosemide now with mild dyspnea.  No chest pain, palpitations.  Hemodynamically stable with bradycardia, saturating well on room air, diffuse swelling to upper and lower extremities with pitting edema.  ECG bradycardic with no ischemia.  Differential includes CHF exacerbation, electrolyte derangement, anemia. Cardiology consulted. 78-year-old female with increased swelling to lower and upper extremities in the setting of discontinuation of furosemide now with mild dyspnea.  No chest pain, palpitations.  Hemodynamically stable with bradycardia, saturating well on room air, diffuse swelling to upper and lower extremities with pitting edema.  ECG bradycardic with no ischemia.  Differential includes CHF exacerbation, electrolyte derangement, anemia. Cardiology consulted.  Labs and imaging independently reviewed and interpreted with note of anemia.  Treating for CHF exacerbation.  Telemetry admission. 78-year-old female with increased swelling to lower and upper extremities in the setting of discontinuation of furosemide now with mild dyspnea.  No chest pain, palpitations.  Hemodynamically stable with bradycardia, saturating well on room air, diffuse swelling to upper and lower extremities with pitting edema.  ECG bradycardic with no ischemia.  Differential includes CHF exacerbation, electrolyte derangement, anemia. Cardiology consulted.  Labs independently reviewed and interpreted with note of anemia.  Treating for CHF exacerbation.  Telemetry admission.

## 2024-02-22 NOTE — CONSULT NOTE ADULT - REASON FOR ADMISSION
My arm hurts and is bruised and swollen  Anemia

## 2024-02-22 NOTE — H&P ADULT - NSHPPHYSICALEXAM_GEN_ALL_CORE
OBJECTIVE:  Vital Signs Last 24 Hrs  T(C): --  T(F): --  HR: 46 (22 Feb 2024 10:00) (46 - 46)  BP: 117/52 (22 Feb 2024 10:00) (117/52 - 122/86)  BP(mean): --  RR: 20 (22 Feb 2024 10:00) (20 - 24)  SpO2: 94% (22 Feb 2024 10:00) (94% - 94%)    Parameters below as of 22 Feb 2024 10:00  Patient On (Oxygen Delivery Method): room air        PHYSICAL EXAMINATION  General:   HEENT:    NECK:    CVS:   RESP:    GI:    :   MSK:    CNS:    INTEG:    PSYCH: OBJECTIVE:  Vital Signs Last 24 Hrs  T(C): --  T(F): --  HR: 46 (22 Feb 2024 10:00) (46 - 46)  BP: 117/52 (22 Feb 2024 10:00) (117/52 - 122/86)  BP(mean): --  RR: 20 (22 Feb 2024 10:00) (20 - 24)  SpO2: 94% (22 Feb 2024 10:00) (94% - 94%)    Parameters below as of 22 Feb 2024 10:00  Patient On (Oxygen Delivery Method): room air        PHYSICAL EXAMINATION  BMI 38  General: Obese female sitting in wheelchair, comfortable  HEENT:  Pale conjunctiva  NECK:  Supple  CVS: regular rate and rhythm S1 S2  RESP:  Right basilar fine crackles  GI:  Soft nondistended nontender BS+  : No suprapubic tenderness  MSK:  Right upper extremity with limited ROM due to pain and bruising from shoulder to elbow. Trace pedal edema  CNS:  Awake, oriented x 3. Follows  commands  INTEG:  Multiple ecchymoses RUE  PSYCH: Fair mood

## 2024-02-22 NOTE — DISCHARGE NOTE PROVIDER - NSDCCPCAREPLAN_GEN_ALL_CORE_FT
PRINCIPAL DISCHARGE DIAGNOSIS  Diagnosis: Iron deficiency anemia secondary to blood loss (chronic)  Assessment and Plan of Treatment:       SECONDARY DISCHARGE DIAGNOSES  Diagnosis: CHF exacerbation  Assessment and Plan of Treatment:     Diagnosis: Chronic atrial fibrillation  Assessment and Plan of Treatment:     Diagnosis: H/O dislocation of shoulder  Assessment and Plan of Treatment:      PRINCIPAL DISCHARGE DIAGNOSIS  Diagnosis: Iron deficiency anemia secondary to blood loss (chronic)  Assessment and Plan of Treatment: take protonix and iron supplements      SECONDARY DISCHARGE DIAGNOSES  Diagnosis: Chronic atrial fibrillation  Assessment and Plan of Treatment: restarted xarelto  stop diltiazem and amiodarone for low heart rate      Diagnosis: H/O dislocation of shoulder  Assessment and Plan of Treatment: follow up with orthopedics

## 2024-02-22 NOTE — DISCHARGE NOTE PROVIDER - NSDCHC_MEDRECSTATUS_GEN_ALL_CORE
"Physical Therapy  Visit Type: initial evaluation  Treatment diagnosis: Gait Dysfunction  Precautions:  Medical precautions: 5/13 - s/p L TKA  Lines:       Lines in chart and on patient reviewed, precautions maintained throughout session. Lower Extremity:    Left:  weight bearing: as tolerated. knee immobilizer until adequate quad strength      SUBJECTIVE  Patient agreed to participate in therapy this date. Patient verbally agrees to allow the following to be present during session: spouse and son  ""I'm dizzy\""Patient has not been hospitalized, in a skilled nursing facility, or seen by home health in the last 30 days.   Patient / Family Goal: return to previous functional status, maximize function and return home    Pain     Location: L knee    At onset of session (out of 10): 4  RN informed on pain level     OBJECTIVE     Oriented to person, place, time and situation   Strength (out of 5 unless otherwise indicated)   WFL: RLE, left ankle  Hip:  Hip Flexion: Left: 4, pain  Hip Extension: Left: 4, pain  Knee:   - Flexion:      â¢ Left: 4-, pain   - Extension:      â¢ Left: 4-, pain    Transfers:    Assistive devices: 2-wheeled walker and gait belt    Sit to stand: with verbal cues and contact guard/touching/steadying assist    Stand to sit: with verbal cues and contact guard/touching/steadying assist    Stand pivot: with verbal cues and contact guard/touching/steadying assist  Training completed:    Tasks: sit to stand, stand to sit and stand pivot    Education details: patient safety and patient demonstrates understanding    L knee immobilizer worn due to dec L quad activation  Gait/Ambulation:     Assistance: contact guard/touching/steadying assist   Assistive device: 2-wheeled walker    Distance (ft): 60    Pattern: step to    Type: antalgic and decreased john    Stance phase: Left: decreased heel strike, decreased weight shift, decreased time in single limb stance and decreased knee stability;     Swing phase: " Right: decreased step length  Training Completed:    Tasks: gait training on level surfaces    Education details: patient safety and patient demonstrates understanding    L knee immobilizer worn due to dec L quad activation  Stair Mobility:    Number of steps: 0;   Training completed:    Tasks: stair training    Education details: patient safety    Stair sequencing and technique reviewed with pt and pt's family. Pt and family demonstrating understanding with sequencing and technique for stair entry to home. Gait belt issued. Interventions     Training provided: activity tolerance, bed mobility training, compensatory techniques, HEP training, gait training, positioning, safety training, transfer training, use of assistive device and stair training  Education on stairs, tub bench, and plan of care. Skilled input: Verbal instruction/cues and tactile instruction/cues  Verbal Consent: Writer verbally educated and received verbal consent for hand placement, positioning of patient, and techniques to be performed today from patient for clothing adjustments for techniques, therapist position for techniques and hand placement and palpation for techniques as described above and how they are pertinent to the patient's plan of care. ASSESSMENT    Impairments: range of motion, strength, endurance and pain  Functional Limitations: all functional mobility           Discharge Recommendations  Recommendation for Discharge: PT IL: Patient requires 24 HOUR assistance to perform mobility and/or ADLs safely, Patient is appropriate for Physical Therapy 1-3 times per week  PT/OT Mobility Equipment for Discharge: RW issued   Skilled therapy is not required due to pt cleared for discharge and per outpatient joint sx protocol.     Pain at end of session: RN informed on pain level 4/10, location: L knee  Predicted patient presentation: Low (stable) - Patient comorbidities and complexities, as defined above, will have little effect on progress for prescribed plan of care. End of Session:   Location: in chair  Safety measures: call light within reach  Handoff to: nurse and family/caregiver  Education Provided:   Learning assessment:  - Primary learner: patient, patient's family and with patient present  - Are they ready to learn: yes  - Preferred learning style: verbal and demonstration  - Barriers to learning: language  Education provided during session:  - Receiving education: patient, patient's family and with patient present  - Results of above outlined education: Verbalizes understanding and Demonstrates understanding    PLAN    Interventions: Pt cleared for discharge home from PT standpoint once medically cleared. Agreement to plan and goals: patient agrees with goals and treatment plan      Documented in the chart in the following areas: Prior Level of Function. Assessment. Admitting diagnosis: Osteoarthritis of left knee, unspecified osteoarthritis type (M17.12)    Co-morbidities and problem list:   There is no problem list on file for this patient. Treatment Diagnosis: Gait Dysfunction    The referring provider's electronic signature on the evaluation authorizes the therapy plan of care and certifies the need for these services, furnished under this plan of care while under their care.       Therapy procedure time and total treatment time can be found documented on the Time Entry flowsheet Admission Reconciliation is Completed  Discharge Reconciliation is Not Complete Admission Reconciliation is Completed  Discharge Reconciliation is Completed

## 2024-02-22 NOTE — ED ADULT NURSE NOTE - CHIEF COMPLAINT QUOTE
Pt BIBA from home, c/o swelling to right arm x 1 week, hx of afib on blood thinners, bradycardic upon ED arrival, pt unsure if that's happened before

## 2024-02-22 NOTE — CONSULT NOTE ADULT - SUBJECTIVE AND OBJECTIVE BOX
Pt Name: NOE SANDHU    MRN: [id number]      Patient is a 78y Female PMHx Hypertension, Dyslipidemia, Diabetes, COPD and Atrial Fibrillation on anticoagulation presents with Right arm pain and swelling and bruising. As per patient she hurt her arm  approximately 6 weeks ago during [facility] s/p mechanical fall. Most recently had acute onset pain of right shoulder 3 days ago, minimally able to move RUE since. Patient Follows Dr. Hernández Orthopedic doctor [place]. Patient last Xray of right shoulder was taken in  late January as per daughter bedside, shoulder was not dislocated as per office. Patient on Xarelto. No other orthopedic complaints. Found to have right  Shoulder dislocation incidental finding on CXR.       REVIEW OF SYSTEMS    General: Alert, responsive, in NAD    Respiratory and Thorax: No difficulty breathing. No cough.    Musculoskeletal: SEE HPI.    Neurological: No sensory or motor changes.     ROS is otherwise negative.      PAST MEDICAL & SURGICAL HISTORY:  PAST MEDICAL & SURGICAL HISTORY:  Permanent atrial fibrillation      Hypertension      Hyperlipemia      Diabetes      COPD (chronic obstructive pulmonary disease)      Former smoker      H/O: hysterectomy      H/O lumpectomy          Allergies: penicillins (Unknown)  doxycycline (Unknown)      Medications: acetaminophen     Tablet .. 975 milliGRAM(s) Oral every 8 hours PRN  albuterol    90 MICROgram(s) HFA Inhaler 2 Puff(s) Inhalation every 6 hours PRN  ARIPiprazole 30 milliGRAM(s) Oral daily  atorvastatin 40 milliGRAM(s) Oral at bedtime  clonazePAM  Tablet 0.5 milliGRAM(s) Oral at bedtime  dextrose 5%. 1000 milliLiter(s) IV Continuous <Continuous>  dextrose 5%. 1000 milliLiter(s) IV Continuous <Continuous>  dextrose 50% Injectable 12.5 Gram(s) IV Push once  dextrose 50% Injectable 25 Gram(s) IV Push once  dextrose 50% Injectable 25 Gram(s) IV Push once  dextrose Oral Gel 15 Gram(s) Oral once PRN  enalapril 5 milliGRAM(s) Oral two times a day  glucagon  Injectable 1 milliGRAM(s) IntraMuscular once  insulin lispro (ADMELOG) corrective regimen sliding scale   SubCutaneous three times a day before meals  insulin lispro (ADMELOG) corrective regimen sliding scale   SubCutaneous at bedtime  iron sucrose Injectable 200 milliGRAM(s) IV Push every 24 hours  pantoprazole  Injectable 40 milliGRAM(s) IV Push every 12 hours      FAMILY HISTORY:  FH: heart disease (Father)  : non-contributory  Social History:                             7.6    6.59  )-----------( 253      ( 22 Feb 2024 10:00 )             24.3       02-22    138  |  101  |  27.2<H>  ----------------------------<  98  4.5   |  25.0  |  0.95    Ca    8.7      22 Feb 2024 10:00  Mg     2.1     02-22    TPro  6.2<L>  /  Alb  3.5  /  TBili  1.0  /  DBili  x   /  AST  24  /  ALT  20  /  AlkPhos  162<H>  02-22      Vital Signs Last 24 Hrs  T(C): 36.7 (22 Feb 2024 16:00), Max: 36.7 (22 Feb 2024 16:00)  T(F): 98 (22 Feb 2024 16:00), Max: 98 (22 Feb 2024 16:00)  HR: 45 (22 Feb 2024 16:00) (45 - 46)  BP: 120/75 (22 Feb 2024 16:00) (117/52 - 122/86)  BP(mean): --  RR: 20 (22 Feb 2024 16:00) (20 - 24)  SpO2: 94% (22 Feb 2024 16:00) (94% - 94%)    Parameters below as of 22 Feb 2024 16:00  Patient On (Oxygen Delivery Method): room air      Daily Height in cm: 160.02 (22 Feb 2024 09:55)    Daily       PHYSICAL EXAM:      Appearance: Alert, responsive, in no acute distress.    Neurological: Sensation is grossly intact to light touch. Median, Radial, ulna nerve distribution intact, No focal deficits or weaknesses found.    Skin:  Ecchymosis over right shoulder and UE.     Vascular: 2+ Radial pulses. Cap refill < 2 sec. No extremity ulcerations. No cyanosis.    Musculoskeletal:         Left Upper Extremity: Spontaneously moving. Active ROM of digits, wrist flexion extension, elbow flexion extension, shoulder abduction adduction without pain elicited. Clavicle nontender to palpation. No bony tenderness. Compartments soft, Compressible, nontender. Denies Numbness, tingling, other orthopedic complaints.       Right Upper Extremity: Active ROM of digits, wrist flexion extension, elbow flexion extension , No shoulder ROM due to pain. Clavicle nontender to palpation. No bony tenderness. Compartments soft, compressible, nontender. Denies Numbness, tingling, other orthopedic complaints.    < from: Xray Chest 1 View- PORTABLE-Urgent (02.22.24 @ 10:24) >    ACC: [id number] EXAM:  XR CHEST PORTABLE URGENT 1V   ORDERED BY: AMY HARDIN     PROCEDURE DATE:  02/22/2024          INTERPRETATION:  EXAM: XR CHEST URGENT    INDICATION: Chest Pain XXR    COMPARISON: CT May 12, 2023    IMPRESSION: Increased perihilar interstitial markings bilaterally. An   element of fluid overload should be considered. The nodular density seen   on previous CT not obvious on this chest x-ray. Follow-up correlation   with repeat CT suggested as indicated clinically previously.    Anterior inferior dislocation of the right humeral head from the glenoid   noted.    Borderline cardiomegaly.    --- End of Report ---        CHAD WALSH MD; Attending Radiologist  This document has been electronically signed. Feb 22 2024 11:34AM      Dedicated Xray of right shoulder obtained.      JOINT REDUCTION  PROCEDURE NOTE: Joint reduction     Performed by: Margarito Wood PA-C    Indication: Dislocation of  Right shoulder    Consent: The risks and benefits of the procedure including incomplete reduction, secondary fracture, nerve damage and bleeding were explained and the patient verbalized their understanding and wished to proceed with the procedure. Written consent was obtained following the discussion.    Universal Protocol: a time out was performed and the correct patient and site were verified     Procedure: Neurovascular exam intact prior to joint reduction.  Skin exam: no bleeding or lacerations at the fracture site. Pain control using IV Dilaudid was administered. Reduction of the Right shoulder was unsuccessful.  The joint was immobilized with splint.  Distally, the extremity was neurovascular intact following the procedure.  The patient tolerated the procedure well.    Post reduction films obtained and demonstrated failed reduction.    Complications: None       A/P:  Pt is a  78y Female found to have Right shoulder dislocation with unknown chronicity <1 month    PLAN:   * D/w Dr. Toledo  * unable to obtain conscious sedation due to patient being admitted and no longer in the ED  * Failed attempt bedside with pain control  * Pain control  * Sling  * NWB RUE  * plan to be determined given chronicity of shoulder vs another attempt to reduce Pt Name: NOE SANDHU    MRN: [id number]      Patient is a 78y Female PMHx Hypertension, Dyslipidemia, Diabetes, COPD and Atrial Fibrillation on anticoagulation presents with Right arm pain and swelling and bruising. As per patient she hurt her arm  approximately 6 weeks ago during [facility] s/p mechanical fall. Most recently had acute onset pain of right shoulder 3 days ago, minimally able to move RUE since. Patient Follows Dr. Hernández Orthopedic doctor [place]. Patient last Xray of right shoulder was taken in  late January as per daughter bedside, shoulder was not dislocated as per office. Patient on Xarelto. No other orthopedic complaints. Found to have right  Shoulder dislocation incidental finding on CXR.       REVIEW OF SYSTEMS    General: Alert, responsive, in NAD    Respiratory and Thorax: No difficulty breathing. No cough.    Musculoskeletal: SEE HPI.    Neurological: No sensory or motor changes.     ROS is otherwise negative.      PAST MEDICAL & SURGICAL HISTORY:  PAST MEDICAL & SURGICAL HISTORY:  Permanent atrial fibrillation      Hypertension      Hyperlipemia      Diabetes      COPD (chronic obstructive pulmonary disease)      Former smoker      H/O: hysterectomy      H/O lumpectomy          Allergies: penicillins (Unknown)  doxycycline (Unknown)      Medications: acetaminophen     Tablet .. 975 milliGRAM(s) Oral every 8 hours PRN  albuterol    90 MICROgram(s) HFA Inhaler 2 Puff(s) Inhalation every 6 hours PRN  ARIPiprazole 30 milliGRAM(s) Oral daily  atorvastatin 40 milliGRAM(s) Oral at bedtime  clonazePAM  Tablet 0.5 milliGRAM(s) Oral at bedtime  dextrose 5%. 1000 milliLiter(s) IV Continuous <Continuous>  dextrose 5%. 1000 milliLiter(s) IV Continuous <Continuous>  dextrose 50% Injectable 12.5 Gram(s) IV Push once  dextrose 50% Injectable 25 Gram(s) IV Push once  dextrose 50% Injectable 25 Gram(s) IV Push once  dextrose Oral Gel 15 Gram(s) Oral once PRN  enalapril 5 milliGRAM(s) Oral two times a day  glucagon  Injectable 1 milliGRAM(s) IntraMuscular once  insulin lispro (ADMELOG) corrective regimen sliding scale   SubCutaneous three times a day before meals  insulin lispro (ADMELOG) corrective regimen sliding scale   SubCutaneous at bedtime  iron sucrose Injectable 200 milliGRAM(s) IV Push every 24 hours  pantoprazole  Injectable 40 milliGRAM(s) IV Push every 12 hours      FAMILY HISTORY:  FH: heart disease (Father)  : non-contributory  Social History:                             7.6    6.59  )-----------( 253      ( 22 Feb 2024 10:00 )             24.3       02-22    138  |  101  |  27.2<H>  ----------------------------<  98  4.5   |  25.0  |  0.95    Ca    8.7      22 Feb 2024 10:00  Mg     2.1     02-22    TPro  6.2<L>  /  Alb  3.5  /  TBili  1.0  /  DBili  x   /  AST  24  /  ALT  20  /  AlkPhos  162<H>  02-22      Vital Signs Last 24 Hrs  T(C): 36.7 (22 Feb 2024 16:00), Max: 36.7 (22 Feb 2024 16:00)  T(F): 98 (22 Feb 2024 16:00), Max: 98 (22 Feb 2024 16:00)  HR: 45 (22 Feb 2024 16:00) (45 - 46)  BP: 120/75 (22 Feb 2024 16:00) (117/52 - 122/86)  BP(mean): --  RR: 20 (22 Feb 2024 16:00) (20 - 24)  SpO2: 94% (22 Feb 2024 16:00) (94% - 94%)    Parameters below as of 22 Feb 2024 16:00  Patient On (Oxygen Delivery Method): room air      Daily Height in cm: 160.02 (22 Feb 2024 09:55)    Daily       PHYSICAL EXAM:      Appearance: Alert, responsive, in no acute distress.    Neurological: Sensation is grossly intact to light touch. Median, Radial, ulna nerve distribution intact, No focal deficits or weaknesses found.    Skin:  Ecchymosis over right shoulder and UE.     Vascular: 2+ Radial pulses. Cap refill < 2 sec. No extremity ulcerations. No cyanosis.    Musculoskeletal:         Left Upper Extremity: Spontaneously moving. Active ROM of digits, wrist flexion extension, elbow flexion extension, shoulder abduction adduction without pain elicited. Clavicle nontender to palpation. No bony tenderness. Compartments soft, Compressible, nontender. Denies Numbness, tingling, other orthopedic complaints.       Right Upper Extremity: Active ROM of digits, wrist flexion extension, elbow flexion extension , No shoulder ROM due to pain. Clavicle nontender to palpation. No bony tenderness. Compartments soft, compressible, nontender. Denies Numbness, tingling, other orthopedic complaints.    < from: Xray Chest 1 View- PORTABLE-Urgent (02.22.24 @ 10:24) >    ACC: [id number] EXAM:  XR CHEST PORTABLE URGENT 1V   ORDERED BY: AMY HARDIN     PROCEDURE DATE:  02/22/2024          INTERPRETATION:  EXAM: XR CHEST URGENT    INDICATION: Chest Pain XXR    COMPARISON: CT May 12, 2023    IMPRESSION: Increased perihilar interstitial markings bilaterally. An   element of fluid overload should be considered. The nodular density seen   on previous CT not obvious on this chest x-ray. Follow-up correlation   with repeat CT suggested as indicated clinically previously.    Anterior inferior dislocation of the right humeral head from the glenoid   noted.    Borderline cardiomegaly.    --- End of Report ---        CHAD WALSH MD; Attending Radiologist  This document has been electronically signed. Feb 22 2024 11:34AM      Dedicated Xray of right shoulder obtained.      JOINT REDUCTION  PROCEDURE NOTE: Joint reduction     Performed by: Margarito Wood PA-C    Indication: Dislocation of  Right shoulder    Consent: The risks and benefits of the procedure including incomplete reduction, secondary fracture, nerve damage and bleeding were explained and the patient verbalized their understanding and wished to proceed with the procedure. Written consent was obtained following the discussion.    Universal Protocol: a time out was performed and the correct patient and site were verified     Procedure: Neurovascular exam intact prior to joint reduction.  Skin exam: no bleeding or lacerations at the fracture site. Pain control using IV Dilaudid was administered. Reduction of the Right shoulder was unsuccessful.  The joint was immobilized with splint.  Distally, the extremity was neurovascular intact following the procedure.  The patient tolerated the procedure well.    Post reduction films obtained and demonstrated failed reduction.    Complications: None       A/P:  Pt is a  78y Female found to have Right shoulder dislocation with unknown chronicity     PLAN:   * D/w Dr. Toledo  * unable to obtain conscious sedation due to by ED due to risk   * multiple trials Failed attempts bedside with pain control  * Pain control  * Sling  * NWB RUE  * follow-up outpatient with Dr. Hernández, No acute orthopedic surgical intervention  * May eat from Orthopedic stand point

## 2024-02-22 NOTE — ED PROVIDER NOTE - OBJECTIVE STATEMENT
78-year-old female with history of HTN, HLD, DM, COPD former smoker, atrial fibrillation on Xarelto presenting with increased swelling in lower extremities and upper extremities.  Patient states that she was told to stop taking furosemide with last dose 4 days ago.  patient reports mild work of breathing. Denies fevers, chills, headache, chest pain, palpitations, shortness of breath, cough, nausea, vomiting, diarrhea, hematuria, dysuria, dark stools, focal neurologic symptoms.  Follows with Dr. Yang 78-year-old female who is a poor historian with history of HTN, HLD, DM, COPD former smoker, atrial fibrillation on Xarelto presenting with increased swelling in lower extremities and upper extremities.  Patient states that she was told to stop taking furosemide with last dose 4 days ago.  Patient reports mild work of breathing. Patient states that she fell 4 weeks ago and was evaluated at another hospital. Denies any falls or traumatic injuries afterwards. Denies fevers, chills, headache, chest pain, palpitations, shortness of breath, cough, nausea, vomiting, diarrhea, hematuria, dysuria, dark stools, focal neurologic symptoms.  Follows with Dr. Yang

## 2024-02-22 NOTE — DISCHARGE NOTE PROVIDER - PROVIDER TOKENS
PROVIDER:[TOKEN:[[id number]]],PROVIDER:[TOKEN:[[id number]]],PROVIDER:[TOKEN:[[id number]]]

## 2024-02-22 NOTE — DISCHARGE NOTE PROVIDER - NSDCMRMEDTOKEN_GEN_ALL_CORE_FT
Abilify 30 mg oral tablet: 1 orally once a day  amiodarone 200 mg oral tablet: 1 tab(s) orally once a day  aspirin 81 mg oral tablet: 81 milligram(s) orally once a day  atorvastatin 40 mg oral tablet: 1 tab(s) orally once a day  clonazePAM 0.5 mg oral tablet: 1 tab(s) orally once a day  DilTIAZem Hydrochloride  mg/24 hours oral capsule, extended release: 1 tab(s) orally once a day  enalapril 5 mg oral tablet: 1 tab(s) orally 2 times a day  furosemide 20 mg oral tablet: 1 orally once a day  metFORMIN 500 mg oral tablet: 1 tab(s) orally once a day Hold 48 hours post procedure  Metoprolol Succinate ER 50 mg oral tablet, extended release: 1 orally once a day  pioglitazone 15 mg oral tablet: 1 tab(s) orally once a day  Xarelto 20 mg oral tablet: 20 milligram(s) orally once a day   Abilify 30 mg oral tablet: 1 orally once a day  acetaminophen 325 mg oral tablet: 3 tab(s) orally every 8 hours As needed Mild Pain (1 - 3), Moderate Pain (4 - 6)  aspirin 81 mg oral tablet: 81 milligram(s) orally once a day  atorvastatin 40 mg oral tablet: 1 tab(s) orally once a day  clonazePAM 0.5 mg oral tablet: 1 tab(s) orally once a day  enalapril 5 mg oral tablet: 1 tab(s) orally 2 times a day  ferrous sulfate 325 mg (65 mg elemental iron) oral tablet: 1 tab(s) orally once a day  furosemide 20 mg oral tablet: 1 orally once a day  metFORMIN 500 mg oral tablet: 1 tab(s) orally once a day Hold 48 hours post procedure  Metoprolol Succinate ER 50 mg oral tablet, extended release: 1 orally once a day  pioglitazone 15 mg oral tablet: 1 tab(s) orally once a day  Protonix 40 mg oral delayed release tablet: 1 tab(s) orally once a day  Xarelto 20 mg oral tablet: 20 milligram(s) orally once a day   Abilify 30 mg oral tablet: 1 orally once a day  acetaminophen 325 mg oral tablet: 3 tab(s) orally every 8 hours As needed Mild Pain (1 - 3), Moderate Pain (4 - 6)  aspirin 81 mg oral tablet: 81 milligram(s) orally once a day  atorvastatin 40 mg oral tablet: 1 tab(s) orally once a day  clonazePAM 0.5 mg oral tablet: 1 tab(s) orally once a day  enalapril 5 mg oral tablet: 1 tab(s) orally 2 times a day  ferrous sulfate 325 mg (65 mg elemental iron) oral tablet: 1 tab(s) orally once a day  furosemide 20 mg oral tablet: 1 orally once a day  metFORMIN 500 mg oral tablet: 1 tab(s) orally once a day Hold 48 hours post procedure  Metoprolol Succinate ER 50 mg oral tablet, extended release: 1 orally once a day  pioglitazone 15 mg oral tablet: 1 tab(s) orally once a day  Protonix 40 mg oral delayed release tablet: 1 tab(s) orally once a day  Xarelto 20 mg oral tablet: 1 tab(s) orally once a day

## 2024-02-22 NOTE — CONSULT NOTE ADULT - ASSESSMENT
78-year-old female who is a poor historian with history of HTN, HLD, DM, COPD (no home oxygen), former smoker, atrial fibrillation on Xarelto presenting with increased edema to bilateral upper and lower extremities. Patient states that she was told to stop taking furosemide with last dose 4 days ago. GI consulted for anemia.    #anemia  - Hemoglobin 7.6  - Trend CBC, transfuse as needed. Monitor for signs of bleeding. Avoid NSAIDs  - IV PPI BID for GI mucosal cytoprotection  - Plan for EGD tomorrow.   - Keep NPO after midnight  - hold Xarelto if started on heparin hold 4 hours pre-procedure  - Active T&S, INR < 1.5, platelet > 50 pre-procedure  - Cardiac clearance needed (patient bradycardic; asymptomatic)  _________________________________________________________________  Assessment and recommendations are final when note is signed by the attending physician.      78-year-old female who is a poor historian with history of HTN, HLD, DM, COPD (no home oxygen), former smoker, atrial fibrillation on Xarelto presenting with increased edema to bilateral upper and lower extremities. Patient states that she was told to stop taking furosemide with last dose 4 days ago. GI consulted for anemia.    #anemia  - Hemoglobin 7.6  - Trend CBC, transfuse as needed. Monitor for signs of bleeding. Avoid NSAIDs  - IV PPI BID for GI mucosal cytoprotection  - Plan for EGD tomorrow.   - Keep NPO after midnight  - hold Xarelto if started on heparin hold 4 hours pre-procedure  - Active T&S, INR < 1.5, platelet > 50 pre-procedure  - Cardiac clearance needed (patient bradycardic; asymptomatic)  - TTE pending    _________________________________________________________________  Assessment and recommendations are final when note is signed by the attending physician.      78-year-old female who is a poor historian with history of HTN, HLD, DM, COPD (no home oxygen), former smoker, atrial fibrillation on Xarelto presenting with increased edema to bilateral upper and lower extremities. Patient states that she was told to stop taking furosemide with last dose 4 days ago. GI consulted for anemia.    #anemia  - Hemoglobin 7.6  - Trend CBC, transfuse as needed. Monitor for signs of bleeding. Avoid NSAIDs  - IV PPI BID for GI mucosal cytoprotection  - Plan for EGD tomorrow.   - Keep NPO after midnight  - hold Xarelto if started on heparin hold 4 hours pre-procedure  - Active T&S, INR < 1.5, platelet > 50 pre-procedure  - Cardiac clearance needed (patient bradycardic; asymptomatic)  - TTE pending  - Please optimize patient for procedure    _________________________________________________________________  Assessment and recommendations are final when note is signed by the attending physician.

## 2024-02-22 NOTE — ED PROVIDER NOTE - PROGRESS NOTE DETAILS
spoke with hospitalist with recommendation for telemetry admission. -DO Leighton Stable on reassessment.  Spoke with hospitalist for telemetry admission.  Additional orders placed per medicine request. -DO Leighton Spoke with hospitalist with concern for Right shoulder on chest x-ray. dedicated right shoulder x-ray ordered.  Spoke with orthopedic team for evaluation of possible chronic right shoulder dislocation in the setting of patient fell about 4 weeks ago.  -DO Leighton

## 2024-02-22 NOTE — ED PROVIDER NOTE - CARE PLAN
1 Principal Discharge DX:	Efraín   Principal Discharge DX:	Anasarca  Secondary Diagnosis:	CHF exacerbation

## 2024-02-23 LAB
A1C WITH ESTIMATED AVERAGE GLUCOSE RESULT: 5 % — SIGNIFICANT CHANGE UP (ref 4–5.6)
ABO RH CONFIRMATION: SIGNIFICANT CHANGE UP
ALBUMIN SERPL ELPH-MCNC: 3.1 G/DL — LOW (ref 3.3–5.2)
ALP SERPL-CCNC: 147 U/L — HIGH (ref 40–120)
ALT FLD-CCNC: 17 U/L — SIGNIFICANT CHANGE UP
ANION GAP SERPL CALC-SCNC: 11 MMOL/L — SIGNIFICANT CHANGE UP (ref 5–17)
APTT BLD: 49.9 SEC — HIGH (ref 24.5–35.6)
AST SERPL-CCNC: 17 U/L — SIGNIFICANT CHANGE UP
BASOPHILS # BLD AUTO: 0.03 K/UL — SIGNIFICANT CHANGE UP (ref 0–0.2)
BASOPHILS NFR BLD AUTO: 0.7 % — SIGNIFICANT CHANGE UP (ref 0–2)
BILIRUB SERPL-MCNC: 0.9 MG/DL — SIGNIFICANT CHANGE UP (ref 0.4–2)
BLD GP AB SCN SERPL QL: SIGNIFICANT CHANGE UP
BUN SERPL-MCNC: 28.1 MG/DL — HIGH (ref 8–20)
CALCIUM SERPL-MCNC: 8.6 MG/DL — SIGNIFICANT CHANGE UP (ref 8.4–10.5)
CHLORIDE SERPL-SCNC: 105 MMOL/L — SIGNIFICANT CHANGE UP (ref 96–108)
CO2 SERPL-SCNC: 28 MMOL/L — SIGNIFICANT CHANGE UP (ref 22–29)
CREAT SERPL-MCNC: 1.04 MG/DL — SIGNIFICANT CHANGE UP (ref 0.5–1.3)
EGFR: 56 ML/MIN/1.73M2 — LOW
EOSINOPHIL # BLD AUTO: 0.11 K/UL — SIGNIFICANT CHANGE UP (ref 0–0.5)
EOSINOPHIL NFR BLD AUTO: 2.5 % — SIGNIFICANT CHANGE UP (ref 0–6)
ESTIMATED AVERAGE GLUCOSE: 97 MG/DL — SIGNIFICANT CHANGE UP (ref 68–114)
GLUCOSE BLDC GLUCOMTR-MCNC: 100 MG/DL — HIGH (ref 70–99)
GLUCOSE BLDC GLUCOMTR-MCNC: 105 MG/DL — HIGH (ref 70–99)
GLUCOSE BLDC GLUCOMTR-MCNC: 118 MG/DL — HIGH (ref 70–99)
GLUCOSE BLDC GLUCOMTR-MCNC: 129 MG/DL — HIGH (ref 70–99)
GLUCOSE SERPL-MCNC: 91 MG/DL — SIGNIFICANT CHANGE UP (ref 70–99)
HCT VFR BLD CALC: 22.5 % — LOW (ref 34.5–45)
HCT VFR BLD CALC: 23.1 % — LOW (ref 34.5–45)
HCV AB S/CO SERPL IA: 0.09 S/CO — SIGNIFICANT CHANGE UP (ref 0–0.99)
HCV AB SERPL-IMP: SIGNIFICANT CHANGE UP
HGB BLD-MCNC: 7.2 G/DL — LOW (ref 11.5–15.5)
HGB BLD-MCNC: 7.3 G/DL — LOW (ref 11.5–15.5)
IMM GRANULOCYTES NFR BLD AUTO: 0.5 % — SIGNIFICANT CHANGE UP (ref 0–0.9)
INR BLD: 1.4 RATIO — HIGH (ref 0.85–1.18)
LACTATE BLDV-MCNC: 0.9 MMOL/L — SIGNIFICANT CHANGE UP (ref 0.5–2)
LYMPHOCYTES # BLD AUTO: 0.4 K/UL — LOW (ref 1–3.3)
LYMPHOCYTES # BLD AUTO: 9.3 % — LOW (ref 13–44)
MAGNESIUM SERPL-MCNC: 2.2 MG/DL — SIGNIFICANT CHANGE UP (ref 1.6–2.6)
MCHC RBC-ENTMCNC: 28.9 PG — SIGNIFICANT CHANGE UP (ref 27–34)
MCHC RBC-ENTMCNC: 31.6 GM/DL — LOW (ref 32–36)
MCV RBC AUTO: 91.3 FL — SIGNIFICANT CHANGE UP (ref 80–100)
MONOCYTES # BLD AUTO: 0.6 K/UL — SIGNIFICANT CHANGE UP (ref 0–0.9)
MONOCYTES NFR BLD AUTO: 13.9 % — SIGNIFICANT CHANGE UP (ref 2–14)
NEUTROPHILS # BLD AUTO: 3.16 K/UL — SIGNIFICANT CHANGE UP (ref 1.8–7.4)
NEUTROPHILS NFR BLD AUTO: 73.1 % — SIGNIFICANT CHANGE UP (ref 43–77)
PHOSPHATE SERPL-MCNC: 4.1 MG/DL — SIGNIFICANT CHANGE UP (ref 2.4–4.7)
PLATELET # BLD AUTO: 226 K/UL — SIGNIFICANT CHANGE UP (ref 150–400)
POTASSIUM SERPL-MCNC: 3.8 MMOL/L — SIGNIFICANT CHANGE UP (ref 3.5–5.3)
POTASSIUM SERPL-SCNC: 3.8 MMOL/L — SIGNIFICANT CHANGE UP (ref 3.5–5.3)
PROT SERPL-MCNC: 5.5 G/DL — LOW (ref 6.6–8.7)
PROTHROM AB SERPL-ACNC: 15.4 SEC — HIGH (ref 9.5–13)
RBC # BLD: 2.53 M/UL — LOW (ref 3.8–5.2)
RBC # FLD: 17 % — HIGH (ref 10.3–14.5)
SODIUM SERPL-SCNC: 144 MMOL/L — SIGNIFICANT CHANGE UP (ref 135–145)
TSH SERPL-MCNC: 10.68 UIU/ML — HIGH (ref 0.27–4.2)
WBC # BLD: 4.32 K/UL — SIGNIFICANT CHANGE UP (ref 3.8–10.5)
WBC # FLD AUTO: 4.32 K/UL — SIGNIFICANT CHANGE UP (ref 3.8–10.5)

## 2024-02-23 PROCEDURE — 71045 X-RAY EXAM CHEST 1 VIEW: CPT | Mod: 26

## 2024-02-23 PROCEDURE — 99233 SBSQ HOSP IP/OBS HIGH 50: CPT

## 2024-02-23 RX ORDER — INFLUENZA VIRUS VACCINE 15; 15; 15; 15 UG/.5ML; UG/.5ML; UG/.5ML; UG/.5ML
0.7 SUSPENSION INTRAMUSCULAR ONCE
Refills: 0 | Status: DISCONTINUED | OUTPATIENT
Start: 2024-02-23 | End: 2024-03-02

## 2024-02-23 RX ADMIN — ARIPIPRAZOLE 30 MILLIGRAM(S): 15 TABLET ORAL at 21:37

## 2024-02-23 RX ADMIN — PANTOPRAZOLE SODIUM 40 MILLIGRAM(S): 20 TABLET, DELAYED RELEASE ORAL at 06:18

## 2024-02-23 RX ADMIN — IRON SUCROSE 200 MILLIGRAM(S): 20 INJECTION, SOLUTION INTRAVENOUS at 19:00

## 2024-02-23 RX ADMIN — PANTOPRAZOLE SODIUM 40 MILLIGRAM(S): 20 TABLET, DELAYED RELEASE ORAL at 19:01

## 2024-02-23 RX ADMIN — Medication 5 MILLIGRAM(S): at 19:38

## 2024-02-23 RX ADMIN — ATORVASTATIN CALCIUM 40 MILLIGRAM(S): 80 TABLET, FILM COATED ORAL at 21:37

## 2024-02-23 RX ADMIN — Medication 0.5 MILLIGRAM(S): at 21:37

## 2024-02-23 NOTE — PROGRESS NOTE ADULT - SUBJECTIVE AND OBJECTIVE BOX
[facility] Division of Hospital Medicine    Chief Complaint:  Anemia    SUBJECTIVE / OVERNIGHT EVENTS: Patient seen and examined. Hypothermic overnight Also noted to be bradycardic. EGD cancelled for today.     Patient denies chest pain, SOB, abd pain, N/V, fever, chills, dysuria or any other complaints. All remainder ROS negative.     MEDICATIONS  (STANDING):  ARIPiprazole 30 milliGRAM(s) Oral daily  atorvastatin 40 milliGRAM(s) Oral at bedtime  clonazePAM  Tablet 0.5 milliGRAM(s) Oral at bedtime  dextrose 5%. 1000 milliLiter(s) (50 mL/Hr) IV Continuous <Continuous>  dextrose 5%. 1000 milliLiter(s) (100 mL/Hr) IV Continuous <Continuous>  dextrose 50% Injectable 25 Gram(s) IV Push once  dextrose 50% Injectable 12.5 Gram(s) IV Push once  dextrose 50% Injectable 25 Gram(s) IV Push once  enalapril 5 milliGRAM(s) Oral two times a day  glucagon  Injectable 1 milliGRAM(s) IntraMuscular once  influenza  Vaccine (HIGH DOSE) 0.7 milliLiter(s) IntraMuscular once  insulin lispro (ADMELOG) corrective regimen sliding scale   SubCutaneous three times a day before meals  insulin lispro (ADMELOG) corrective regimen sliding scale   SubCutaneous at bedtime  iron sucrose Injectable 200 milliGRAM(s) IV Push every 24 hours  metoprolol succinate ER 25 milliGRAM(s) Oral daily  pantoprazole  Injectable 40 milliGRAM(s) IV Push every 12 hours    MEDICATIONS  (PRN):  acetaminophen     Tablet .. 975 milliGRAM(s) Oral every 8 hours PRN Mild Pain (1 - 3), Moderate Pain (4 - 6)  albuterol    90 MICROgram(s) HFA Inhaler 2 Puff(s) Inhalation every 6 hours PRN Shortness of Breath and/or Wheezing  dextrose Oral Gel 15 Gram(s) Oral once PRN Blood Glucose LESS THAN 70 milliGRAM(s)/deciliter        I&O's Summary      PHYSICAL EXAM:  Vital Signs Last 24 Hrs  T(C): 36.4 (23 Feb 2024 12:10), Max: 37.1 (23 Feb 2024 09:23)  T(F): 97.6 (23 Feb 2024 12:10), Max: 98.7 (23 Feb 2024 09:23)  HR: 53 (23 Feb 2024 12:10) (41 - 82)  BP: 111/72 (23 Feb 2024 12:10) (95/58 - 121/74)  BP(mean): --  RR: 20 (23 Feb 2024 12:10) (20 - 20)  SpO2: 94% (23 Feb 2024 12:10) (90% - 96%)    Parameters below as of 23 Feb 2024 12:10  Patient On (Oxygen Delivery Method): room air            CONSTITUTIONAL: NAD, obese  ENMT: Moist oral mucosa, no pharyngeal injection or exudates  RESPIRATORY: Normal respiratory effort; lungs are clear to auscultation bilaterally  CARDIOVASCULAR: Regular rate and rhythm, normal S1 and S2, ; No lower extremity edema;  ABDOMEN: Nontender to palpation, normoactive bowel sounds, no rebound/guarding;   MUSCLOSKELETAL:  decreased rom in right shoulder  PSYCH: A+O to person, place, and time; affect appropriate  NEUROLOGY: CN 2-12 are intact and symmetric; no gross sensory deficits;   SKIN: No rashes; no palpable lesions    LABS:                        7.2    x     )-----------( x        ( 23 Feb 2024 07:49 )             22.5     02-23    144  |  105  |  28.1<H>  ----------------------------<  91  3.8   |  28.0  |  1.04    Ca    8.6      23 Feb 2024 02:15  Phos  4.1     02-23  Mg     2.2     02-23    TPro  5.5<L>  /  Alb  3.1<L>  /  TBili  0.9  /  DBili  x   /  AST  17  /  ALT  17  /  AlkPhos  147<H>  02-23    PT/INR - ( 23 Feb 2024 02:15 )   PT: 15.4 sec;   INR: 1.40 ratio         PTT - ( 23 Feb 2024 02:15 )  PTT:49.9 sec      Urinalysis Basic - ( 23 Feb 2024 02:15 )    Color: x / Appearance: x / SG: x / pH: x  Gluc: 91 mg/dL / Ketone: x  / Bili: x / Urobili: x   Blood: x / Protein: x / Nitrite: x   Leuk Esterase: x / RBC: x / WBC x   Sq Epi: x / Non Sq Epi: x / Bacteria: x        CAPILLARY BLOOD GLUCOSE      POCT Blood Glucose.: 118 mg/dL (23 Feb 2024 12:08)  POCT Blood Glucose.: 105 mg/dL (23 Feb 2024 09:08)  POCT Blood Glucose.: 148 mg/dL (22 Feb 2024 22:42)  POCT Blood Glucose.: 112 mg/dL (22 Feb 2024 17:44)        RADIOLOGY & ADDITIONAL TESTS:  Results Reviewed:   Imaging Personally Reviewed:  Electrocardiogram Personally Reviewed:

## 2024-02-23 NOTE — PROGRESS NOTE ADULT - ASSESSMENT
77 y/o F with PMHx HTN, HLD, DM, COPD (no home oxygen), former smoker, atrial fibrillation on Xarelto presenting with increased edema to bilateral upper and lower extremities. GI consulted for anemia.    #Anemia  #Afib on Xarelto   #NSAID use     - Trend CBC, transfuse as needed, maintain Hgb > 8 in cardiac patient. Monitor for signs of bleeding.   - Avoid NSAIDs (excluding aspirin)   - IV PPI BID for GI mucosal cytoprotection  - Plan for EGD (+/- colonoscopy) when patient is medically optimized   - Continue diet as tolerated   _________________________________________________________________  Assessment and recommendations are final when note is signed by the attending physician.

## 2024-02-23 NOTE — PROGRESS NOTE ADULT - ASSESSMENT
78 year old female with Hypertension, Dyslipidemia, Diabetes, COPD and Atrial Fibrillation on anticoagulation presents with Right arm pain and swelling and bruising. Daily ASA, Xarelto and now with Motrin for right shoulder pain  Anemia (Hgb 7.6), Elevated . Chest X-Ray with increased interstitial marking as well as inferior dislocation of right shoulder. EKG Sinus bradycardia    # Anemia, likely due to slow ongoing upper GI loss  # Iron deficiency  # GERD   -  telemetry given bradycardia  - EGD cancelled due to hypothermia  - Hod Rivaroxaban and ASA for now.  - No NSAIDS  - Pantoprazole 40mg IV q12  - IV Iron  - Monitor Hgb, transfuse PRBC if <7 or symptoms, will give 1 unit rbc as her hgb is 7.3  - GI following    #Hypothermia overnight  - follow blood cx  - follow ua  - obtain TSH    # Bradycardia  # Paroxysmal Atrial Fibrillation  # Chronic CHF ?EF  - Admit to telemetry  - I/O, daily weight, salt and fluid restriction  - Furosemide 40mg PO daily. Hold off Triamterene for now  - BB with parameters due to bradycardia  - Hold CCB and Amiodarone due to bradycardia  - Rivaroxaban on hold due to anemia  - Consider ARNi, MRA if BP tolerates  - Consider SGLT2 upon discharge  - TTE  - Cardiology evaluation    # Dislocation, Right Shoulder  - Acetaminophen 975mg q8  - Ortho consulted, MARY Blackwood, follow-up outpatient with Dr. Hernández, No acute orthopedic surgical intervention    # T2DM  - Hold oral hypoglycemic agent  - Blood glucose monitoring with sliding scale Lispro  - A1c 5.0%    # Dyslipidemia  - Statin    # COPD  - PRN IMNG    VTE prophylaxis  - VCD

## 2024-02-23 NOTE — CHART NOTE - NSCHARTNOTEFT_GEN_A_CORE
Called by RN overnight, pt hypothermic 94.5 F  Infectious workup ordered. Repeat hgb 7.3.     Pt seen and examined at bedside. Complains of mild SOB. Denies any chest pain, palpitations, dizziness, abd pain, N/V, or burning with urination. Temp s/p warming blanket now 95.5 F.  Blood consent obtained at bedside. Discussed indications, risks, benefits if pt were to need a transfusion including mild allergic reaction, fever, SOB, anaphylaxis, low risk of moshe bloodborne pathogens. Pt agreeable to transfusion if needed    Vital Signs Last 24 Hrs  T(C): 35.3 (23 Feb 2024 03:15), Max: 36.7 (22 Feb 2024 16:00)  T(F): 95.5 (23 Feb 2024 03:15), Max: 98 (22 Feb 2024 16:00)  HR: 46 (23 Feb 2024 03:15) (41 - 46)  BP: 113/69 (23 Feb 2024 03:15) (109/80 - 122/86)  RR: 20 (23 Feb 2024 03:15) (20 - 24)  SpO2: 91% (23 Feb 2024 03:15) (90% - 96%)    Parameters below as of 23 Feb 2024 03:15  Patient On (Oxygen Delivery Method): room air    PHYSICAL EXAM:  GENERAL: Pt lying in bed comfortably in NAD  CHEST/LUNG: Unlabored respirations. Lungs clear to auscultation bilaterally; No rales, rhonchi or wheezing.   HEART: +S1, S2, Regular rate and rhythm   ABDOMEN: Bowel sounds present; Soft, Nontender, Nondistended. No guarding or rigidity    SKIN: No rashes or lesions    Infectious workup ordered  - Lactate 0.9. no leukocytosis, blood cultures sent   - UA pending  - C/o mild SOB--> repeat CXR pending   - Repeat H/H at 0700  - Pt can't recall home CPAP settings, will start on CPAP at 5 tomorrow night   - Will cont to monitor Called by RN overnight, pt hypothermic 94.5 F  Infectious workup ordered. Repeat hgb 7.3.     Pt seen and examined at bedside. Complains of mild SOB but states she normally wears CPAP at night, can not recall settings. Denies any chest pain, palpitations, dizziness, abd pain, N/V, or burning with urination. Temp s/p warming blanket now 95.5 F.  Blood consent obtained at bedside. Discussed indications, risks, benefits if pt were to need a transfusion including mild allergic reaction, fever, SOB, anaphylaxis, low risk of moshe bloodborne pathogens. Pt agreeable to transfusion if needed    Vital Signs Last 24 Hrs  T(C): 35.3 (23 Feb 2024 03:15), Max: 36.7 (22 Feb 2024 16:00)  T(F): 95.5 (23 Feb 2024 03:15), Max: 98 (22 Feb 2024 16:00)  HR: 46 (23 Feb 2024 03:15) (41 - 46)  BP: 113/69 (23 Feb 2024 03:15) (109/80 - 122/86)  RR: 20 (23 Feb 2024 03:15) (20 - 24)  SpO2: 91% (23 Feb 2024 03:15) (90% - 96%)    Parameters below as of 23 Feb 2024 03:15  Patient On (Oxygen Delivery Method): room air    PHYSICAL EXAM:  GENERAL: Pt lying in bed comfortably in NAD  CHEST/LUNG: Unlabored respirations. Lungs clear to auscultation bilaterally; No rales, rhonchi or wheezing.   HEART: +S1, S2, Regular rate and rhythm   ABDOMEN: Bowel sounds present; Soft, Nontender, Nondistended. No guarding or rigidity    SKIN: No rashes or lesions    Infectious workup ordered  - Lactate 0.9. no leukocytosis, blood cultures sent   - UA pending  - C/o mild SOB--> repeat CXR pending   - Hgb 7.3 < 7.6, will repeat H/H at 0700  - pt wishes to start CPAP tomorrow night, will start at 5  - Will cont to monitor

## 2024-02-23 NOTE — PROGRESS NOTE ADULT - SUBJECTIVE AND OBJECTIVE BOX
Chief Complaint:  Patient is a 78y old  Female who presents with a chief complaint of My arm hurts and is bruised and swollen  Anemia (22 Feb 2024 18:39)      HPI/ 24 hr events: Patient seen and examined at bedside. She feels well this morning. She is tolerating regular diet. She did not have a BM overnight or today. She was hypothermic overnight and case cancelled. Vitals are overall stable.       REVIEW OF SYSTEMS:   General: Negative  HEENT: Negative  CV: Negative  Respiratory: Negative  GI: See HPI  : Negative  MSK: Negative  Hematologic: Negative  Skin: Negative    MEDICATIONS:   MEDICATIONS  (STANDING):  ARIPiprazole 30 milliGRAM(s) Oral daily  atorvastatin 40 milliGRAM(s) Oral at bedtime  clonazePAM  Tablet 0.5 milliGRAM(s) Oral at bedtime  dextrose 5%. 1000 milliLiter(s) (50 mL/Hr) IV Continuous <Continuous>  dextrose 5%. 1000 milliLiter(s) (100 mL/Hr) IV Continuous <Continuous>  dextrose 50% Injectable 25 Gram(s) IV Push once  dextrose 50% Injectable 12.5 Gram(s) IV Push once  dextrose 50% Injectable 25 Gram(s) IV Push once  enalapril 5 milliGRAM(s) Oral two times a day  glucagon  Injectable 1 milliGRAM(s) IntraMuscular once  influenza  Vaccine (HIGH DOSE) 0.7 milliLiter(s) IntraMuscular once  insulin lispro (ADMELOG) corrective regimen sliding scale   SubCutaneous three times a day before meals  insulin lispro (ADMELOG) corrective regimen sliding scale   SubCutaneous at bedtime  iron sucrose Injectable 200 milliGRAM(s) IV Push every 24 hours  metoprolol succinate ER 25 milliGRAM(s) Oral daily  pantoprazole  Injectable 40 milliGRAM(s) IV Push every 12 hours    MEDICATIONS  (PRN):  acetaminophen     Tablet .. 975 milliGRAM(s) Oral every 8 hours PRN Mild Pain (1 - 3), Moderate Pain (4 - 6)  albuterol    90 MICROgram(s) HFA Inhaler 2 Puff(s) Inhalation every 6 hours PRN Shortness of Breath and/or Wheezing  dextrose Oral Gel 15 Gram(s) Oral once PRN Blood Glucose LESS THAN 70 milliGRAM(s)/deciliter            DIET:  Diet, DASH/TLC:   Sodium & Cholesterol Restricted  Consistent Carbohydrate Evening Snack (CSTCHOSN)  1200mL Fluid Restriction ([id number]) (02-22-24 @ 12:01) [Active]          ALLERGIES:   Allergies    penicillins (Unknown)  doxycycline (Unknown)    Intolerances        VITAL SIGNS:   Vital Signs Last 24 Hrs  T(C): 37.1 (23 Feb 2024 09:23), Max: 37.1 (23 Feb 2024 09:23)  T(F): 98.7 (23 Feb 2024 09:23), Max: 98.7 (23 Feb 2024 09:23)  HR: 60 (23 Feb 2024 09:23) (41 - 82)  BP: 99/63 (23 Feb 2024 09:23) (95/58 - 121/74)  BP(mean): --  RR: 20 (23 Feb 2024 06:15) (20 - 20)  SpO2: 95% (23 Feb 2024 09:23) (90% - 96%)    Parameters below as of 23 Feb 2024 09:23    O2 Flow (L/min): 1    I&O's Summary      PHYSICAL EXAM:   GENERAL:  No acute distress  HEENT:  NC/AT, conjunctiva clear, sclera anicteric  CHEST:  Diminished breath sounds, on nasal cannula   HEART:  Bradycardic rate  ABDOMEN:  Soft, non-tender, non-distended, normoactive bowel sounds, no rebound or guarding  EXTREMITIES: +edema  SKIN:  Warm, dry  NEURO:  Calm, cooperative    LABS:                        7.2    x     )-----------( x        ( 23 Feb 2024 07:49 )             22.5     Hemoglobin: 7.2 g/dL (02-23-24 @ 07:49)  Hemoglobin: 7.3 g/dL (02-23-24 @ 02:15)  Hemoglobin: 7.6 g/dL (02-22-24 @ 10:00)    02-23    144  |  105  |  28.1<H>  ----------------------------<  91  3.8   |  28.0  |  1.04    Ca    8.6      23 Feb 2024 02:15  Phos  4.1     02-23  Mg     2.2     02-23    TPro  5.5<L>  /  Alb  3.1<L>  /  TBili  0.9  /  DBili  x   /  AST  17  /  ALT  17  /  AlkPhos  147<H>  02-23    LIVER FUNCTIONS - ( 23 Feb 2024 02:15 )  Alb: 3.1 g/dL / Pro: 5.5 g/dL / ALK PHOS: 147 U/L / ALT: 17 U/L / AST: 17 U/L / GGT: x             PT/INR - ( 23 Feb 2024 02:15 )   PT: 15.4 sec;   INR: 1.40 ratio         PTT - ( 23 Feb 2024 02:15 )  PTT:49.9 sec                                        RADIOLOGY & ADDITIONAL STUDIES:                 Chief Complaint:  Patient is a 78y old  Female who presents with a chief complaint of My arm hurts and is bruised and swollen  Anemia (22 Feb 2024 18:39)      HPI/ 24 hr events: Patient seen and examined at bedside. She feels well this morning. She is tolerating regular diet. She did not have a BM overnight or today. She was hypothermic overnight and case cancelled. Vitals are overall stable.       REVIEW OF SYSTEMS:   General: Negative  HEENT: Negative  CV: Negative  Respiratory: Negative  GI: See HPI  : Negative  MSK: Negative  Hematologic: Negative  Skin: Negative    MEDICATIONS:   MEDICATIONS  (STANDING):  ARIPiprazole 30 milliGRAM(s) Oral daily  atorvastatin 40 milliGRAM(s) Oral at bedtime  clonazePAM  Tablet 0.5 milliGRAM(s) Oral at bedtime  dextrose 5%. 1000 milliLiter(s) (50 mL/Hr) IV Continuous <Continuous>  dextrose 5%. 1000 milliLiter(s) (100 mL/Hr) IV Continuous <Continuous>  dextrose 50% Injectable 25 Gram(s) IV Push once  dextrose 50% Injectable 12.5 Gram(s) IV Push once  dextrose 50% Injectable 25 Gram(s) IV Push once  enalapril 5 milliGRAM(s) Oral two times a day  glucagon  Injectable 1 milliGRAM(s) IntraMuscular once  influenza  Vaccine (HIGH DOSE) 0.7 milliLiter(s) IntraMuscular once  insulin lispro (ADMELOG) corrective regimen sliding scale   SubCutaneous three times a day before meals  insulin lispro (ADMELOG) corrective regimen sliding scale   SubCutaneous at bedtime  iron sucrose Injectable 200 milliGRAM(s) IV Push every 24 hours  metoprolol succinate ER 25 milliGRAM(s) Oral daily  pantoprazole  Injectable 40 milliGRAM(s) IV Push every 12 hours    MEDICATIONS  (PRN):  acetaminophen     Tablet .. 975 milliGRAM(s) Oral every 8 hours PRN Mild Pain (1 - 3), Moderate Pain (4 - 6)  albuterol    90 MICROgram(s) HFA Inhaler 2 Puff(s) Inhalation every 6 hours PRN Shortness of Breath and/or Wheezing  dextrose Oral Gel 15 Gram(s) Oral once PRN Blood Glucose LESS THAN 70 milliGRAM(s)/deciliter            DIET:  Diet, DASH/TLC:   Sodium & Cholesterol Restricted  Consistent Carbohydrate Evening Snack (CSTCHOSN)  1200mL Fluid Restriction ([id number]) (02-22-24 @ 12:01) [Active]          ALLERGIES:   Allergies    penicillins (Unknown)  doxycycline (Unknown)    Intolerances        VITAL SIGNS:   Vital Signs Last 24 Hrs  T(C): 37.1 (23 Feb 2024 09:23), Max: 37.1 (23 Feb 2024 09:23)  T(F): 98.7 (23 Feb 2024 09:23), Max: 98.7 (23 Feb 2024 09:23)  HR: 60 (23 Feb 2024 09:23) (41 - 82)  BP: 99/63 (23 Feb 2024 09:23) (95/58 - 121/74)  BP(mean): --  RR: 20 (23 Feb 2024 06:15) (20 - 20)  SpO2: 95% (23 Feb 2024 09:23) (90% - 96%)    Parameters below as of 23 Feb 2024 09:23    O2 Flow (L/min): 1    I&O's Summary      PHYSICAL EXAM:   GENERAL:  No acute distress  HEENT:  NC/AT, conjunctiva clear, sclera anicteric  CHEST:  Diminished breath sounds, on nasal cannula   HEART:  Bradycardic rate  ABDOMEN:  Soft, non-tender, non-distended, normoactive bowel sounds, no rebound or guarding  EXTREMITIES: +edema  SKIN:  Warm, dry  NEURO:  Calm, cooperative    LABS:                        7.2    x     )-----------( x        ( 23 Feb 2024 07:49 )             22.5     Hemoglobin: 7.2 g/dL (02-23-24 @ 07:49)  Hemoglobin: 7.3 g/dL (02-23-24 @ 02:15)  Hemoglobin: 7.6 g/dL (02-22-24 @ 10:00)    02-23    144  |  105  |  28.1<H>  ----------------------------<  91  3.8   |  28.0  |  1.04    Ca    8.6      23 Feb 2024 02:15  Phos  4.1     02-23  Mg     2.2     02-23    TPro  5.5<L>  /  Alb  3.1<L>  /  TBili  0.9  /  DBili  x   /  AST  17  /  ALT  17  /  AlkPhos  147<H>  02-23    LIVER FUNCTIONS - ( 23 Feb 2024 02:15 )  Alb: 3.1 g/dL / Pro: 5.5 g/dL / ALK PHOS: 147 U/L / ALT: 17 U/L / AST: 17 U/L / GGT: x             PT/INR - ( 23 Feb 2024 02:15 )   PT: 15.4 sec;   INR: 1.40 ratio         PTT - ( 23 Feb 2024 02:15 )  PTT:49.9 sec                                        RADIOLOGY & ADDITIONAL STUDIES:

## 2024-02-23 NOTE — PATIENT PROFILE ADULT - FUNCTIONAL ASSESSMENT - DAILY ACTIVITY 6.
4 = No assist / stand by assistance Birth Control Pills Pregnancy And Lactation Text: This medication should be avoided if pregnant and for the first 30 days post-partum.

## 2024-02-23 NOTE — PROGRESS NOTE ADULT - SUBJECTIVE AND OBJECTIVE BOX
INTERVAL HISTORY:  Hypothermic overnight  GI work up was cancelled  No chest pain  Now being transfused  	  MEDICATIONS:  enalapril 5 milliGRAM(s) Oral two times a day  metoprolol succinate ER 25 milliGRAM(s) Oral daily  albuterol    90 MICROgram(s) HFA Inhaler 2 Puff(s) Inhalation every 6 hours PRN    acetaminophen     Tablet .. 975 milliGRAM(s) Oral every 8 hours PRN  ARIPiprazole 30 milliGRAM(s) Oral daily  clonazePAM  Tablet 0.5 milliGRAM(s) Oral at bedtime    pantoprazole  Injectable 40 milliGRAM(s) IV Push every 12 hours    atorvastatin 40 milliGRAM(s) Oral at bedtime  dextrose 50% Injectable 12.5 Gram(s) IV Push once  dextrose 50% Injectable 25 Gram(s) IV Push once  dextrose 50% Injectable 25 Gram(s) IV Push once  dextrose Oral Gel 15 Gram(s) Oral once PRN  glucagon  Injectable 1 milliGRAM(s) IntraMuscular once  insulin lispro (ADMELOG) corrective regimen sliding scale   SubCutaneous three times a day before meals  insulin lispro (ADMELOG) corrective regimen sliding scale   SubCutaneous at bedtime    dextrose 5%. 1000 milliLiter(s) IV Continuous <Continuous>  dextrose 5%. 1000 milliLiter(s) IV Continuous <Continuous>  influenza  Vaccine (HIGH DOSE) 0.7 milliLiter(s) IntraMuscular once  iron sucrose Injectable 200 milliGRAM(s) IV Push every 24 hours        PHYSICAL EXAM:    T(C): 36.4 (02-23-24 @ 14:23), Max: 37.1 (02-23-24 @ 09:23)  HR: 49 (02-23-24 @ 14:23) (41 - 82)  BP: 102/73 (02-23-24 @ 14:23) (95/58 - 121/74)  RR: 20 (02-23-24 @ 12:10) (20 - 20)  SpO2: 94% (02-23-24 @ 12:10) (90% - 96%)  Wt(kg): --    I&O's Summary      Daily     Daily     Appearance: Normal	  HEENT:   Normal oral mucosa, PERRL, EOMI	  Cardiovascular: Normal S1 S2, No JVD, No murmurs, No edema  Respiratory: Lungs clear to auscultation	  Psychiatry: A & O x 3, Mood & affect appropriate  Gastrointestinal:  Soft, Non-tender, + BS	  Skin: No rashes, No ecchymoses, No cyanosis  Neurologic: Non-focal  Extremities: Mild edema      TELEMETRY: 	  Sinus Leon 50's                          7.2    x     )-----------( x        ( 23 Feb 2024 07:49 )             22.5     02-23    144  |  105  |  28.1<H>  ----------------------------<  91  3.8   |  28.0  |  1.04    Ca    8.6      23 Feb 2024 02:15  Phos  4.1     02-23  Mg     2.2     02-23    TPro  5.5<L>  /  Alb  3.1<L>  /  TBili  0.9  /  DBili  x   /  AST  17  /  ALT  17  /  AlkPhos  147<H>  02-23    proBNP:   Lipid Profile:   HgA1c:     ASSESSMENT/PLAN: 	  78 year old female former smoker with  PMH of Hypertension, HLD, Diabetes, COPD and Atrial Fibrillation (S/p A-fib ablation with Dr Crowley 6/2023) with continued episodes of paroxysmal A-Fib and remains on Xarelto, who presents to ER today with complaints of Right arm pain and increased swelling and bruising. As per patient she hurt her arm approximately 6 weeks ago and had imaging which dtr reported was an Xray and MRI and both studies were reported to be normal. Pt also reports recent few episodes of dark tarry stool in the past few weeks.   Office Echo 2/28/2023 shows Atrial Fibrillation, Normal LVEF 55-60%, Mild-Moderate MR, Mild TR  C 3/2023  shows Normal LVEF 55%, Mild to moderate stenosis of the MID LAD and OM3, No AS, Very Mild MR    Being transfused  Will repeat Echo in the hospital  Hypothermic.  ? etiology.  Monitor   No cardiac contraindication to GI work up ( EGD/Colonoscopy)  when medically optimized.

## 2024-02-24 ENCOUNTER — RESULT REVIEW (OUTPATIENT)
Age: 79
End: 2024-02-24

## 2024-02-24 LAB
ALBUMIN SERPL ELPH-MCNC: 3.1 G/DL — LOW (ref 3.3–5.2)
ALP SERPL-CCNC: 148 U/L — HIGH (ref 40–120)
ALT FLD-CCNC: 16 U/L — SIGNIFICANT CHANGE UP
ANION GAP SERPL CALC-SCNC: 12 MMOL/L — SIGNIFICANT CHANGE UP (ref 5–17)
AST SERPL-CCNC: 18 U/L — SIGNIFICANT CHANGE UP
BASOPHILS # BLD AUTO: 0.04 K/UL — SIGNIFICANT CHANGE UP (ref 0–0.2)
BASOPHILS NFR BLD AUTO: 0.8 % — SIGNIFICANT CHANGE UP (ref 0–2)
BILIRUB SERPL-MCNC: 1 MG/DL — SIGNIFICANT CHANGE UP (ref 0.4–2)
BUN SERPL-MCNC: 33.8 MG/DL — HIGH (ref 8–20)
CALCIUM SERPL-MCNC: 8.4 MG/DL — SIGNIFICANT CHANGE UP (ref 8.4–10.5)
CHLORIDE SERPL-SCNC: 107 MMOL/L — SIGNIFICANT CHANGE UP (ref 96–108)
CO2 SERPL-SCNC: 26 MMOL/L — SIGNIFICANT CHANGE UP (ref 22–29)
CREAT SERPL-MCNC: 0.85 MG/DL — SIGNIFICANT CHANGE UP (ref 0.5–1.3)
EGFR: 71 ML/MIN/1.73M2 — SIGNIFICANT CHANGE UP
EOSINOPHIL # BLD AUTO: 0.2 K/UL — SIGNIFICANT CHANGE UP (ref 0–0.5)
EOSINOPHIL NFR BLD AUTO: 4 % — SIGNIFICANT CHANGE UP (ref 0–6)
GLUCOSE BLDC GLUCOMTR-MCNC: 112 MG/DL — HIGH (ref 70–99)
GLUCOSE BLDC GLUCOMTR-MCNC: 139 MG/DL — HIGH (ref 70–99)
GLUCOSE BLDC GLUCOMTR-MCNC: 144 MG/DL — HIGH (ref 70–99)
GLUCOSE BLDC GLUCOMTR-MCNC: 96 MG/DL — SIGNIFICANT CHANGE UP (ref 70–99)
GLUCOSE SERPL-MCNC: 80 MG/DL — SIGNIFICANT CHANGE UP (ref 70–99)
HCT VFR BLD CALC: 26 % — LOW (ref 34.5–45)
HCT VFR BLD CALC: 27.5 % — LOW (ref 34.5–45)
HGB BLD-MCNC: 8.3 G/DL — LOW (ref 11.5–15.5)
HGB BLD-MCNC: 8.7 G/DL — LOW (ref 11.5–15.5)
IMM GRANULOCYTES NFR BLD AUTO: 0.8 % — SIGNIFICANT CHANGE UP (ref 0–0.9)
LYMPHOCYTES # BLD AUTO: 0.77 K/UL — LOW (ref 1–3.3)
LYMPHOCYTES # BLD AUTO: 15.5 % — SIGNIFICANT CHANGE UP (ref 13–44)
MAGNESIUM SERPL-MCNC: 2.4 MG/DL — SIGNIFICANT CHANGE UP (ref 1.6–2.6)
MCHC RBC-ENTMCNC: 28.4 PG — SIGNIFICANT CHANGE UP (ref 27–34)
MCHC RBC-ENTMCNC: 28.5 PG — SIGNIFICANT CHANGE UP (ref 27–34)
MCHC RBC-ENTMCNC: 31.6 GM/DL — LOW (ref 32–36)
MCHC RBC-ENTMCNC: 31.9 GM/DL — LOW (ref 32–36)
MCV RBC AUTO: 89.3 FL — SIGNIFICANT CHANGE UP (ref 80–100)
MCV RBC AUTO: 89.9 FL — SIGNIFICANT CHANGE UP (ref 80–100)
MONOCYTES # BLD AUTO: 0.72 K/UL — SIGNIFICANT CHANGE UP (ref 0–0.9)
MONOCYTES NFR BLD AUTO: 14.5 % — HIGH (ref 2–14)
NEUTROPHILS # BLD AUTO: 3.21 K/UL — SIGNIFICANT CHANGE UP (ref 1.8–7.4)
NEUTROPHILS NFR BLD AUTO: 64.4 % — SIGNIFICANT CHANGE UP (ref 43–77)
PLATELET # BLD AUTO: 234 K/UL — SIGNIFICANT CHANGE UP (ref 150–400)
PLATELET # BLD AUTO: 239 K/UL — SIGNIFICANT CHANGE UP (ref 150–400)
POTASSIUM SERPL-MCNC: 3.5 MMOL/L — SIGNIFICANT CHANGE UP (ref 3.5–5.3)
POTASSIUM SERPL-SCNC: 3.5 MMOL/L — SIGNIFICANT CHANGE UP (ref 3.5–5.3)
PROT SERPL-MCNC: 5.6 G/DL — LOW (ref 6.6–8.7)
RBC # BLD: 2.91 M/UL — LOW (ref 3.8–5.2)
RBC # BLD: 3.06 M/UL — LOW (ref 3.8–5.2)
RBC # FLD: 16.2 % — HIGH (ref 10.3–14.5)
RBC # FLD: 16.2 % — HIGH (ref 10.3–14.5)
SODIUM SERPL-SCNC: 145 MMOL/L — SIGNIFICANT CHANGE UP (ref 135–145)
T4 FREE SERPL-MCNC: 1.3 NG/DL — SIGNIFICANT CHANGE UP (ref 0.9–1.8)
WBC # BLD: 4.98 K/UL — SIGNIFICANT CHANGE UP (ref 3.8–10.5)
WBC # BLD: 5.97 K/UL — SIGNIFICANT CHANGE UP (ref 3.8–10.5)
WBC # FLD AUTO: 4.98 K/UL — SIGNIFICANT CHANGE UP (ref 3.8–10.5)
WBC # FLD AUTO: 5.97 K/UL — SIGNIFICANT CHANGE UP (ref 3.8–10.5)

## 2024-02-24 PROCEDURE — 93356 MYOCRD STRAIN IMG SPCKL TRCK: CPT

## 2024-02-24 PROCEDURE — 99233 SBSQ HOSP IP/OBS HIGH 50: CPT

## 2024-02-24 PROCEDURE — 93306 TTE W/DOPPLER COMPLETE: CPT | Mod: 26

## 2024-02-24 RX ADMIN — IRON SUCROSE 200 MILLIGRAM(S): 20 INJECTION, SOLUTION INTRAVENOUS at 17:45

## 2024-02-24 RX ADMIN — PANTOPRAZOLE SODIUM 40 MILLIGRAM(S): 20 TABLET, DELAYED RELEASE ORAL at 17:52

## 2024-02-24 RX ADMIN — ATORVASTATIN CALCIUM 40 MILLIGRAM(S): 80 TABLET, FILM COATED ORAL at 22:46

## 2024-02-24 RX ADMIN — ARIPIPRAZOLE 30 MILLIGRAM(S): 15 TABLET ORAL at 17:44

## 2024-02-24 RX ADMIN — Medication 0.5 MILLIGRAM(S): at 22:46

## 2024-02-24 RX ADMIN — Medication 5 MILLIGRAM(S): at 05:39

## 2024-02-24 RX ADMIN — PANTOPRAZOLE SODIUM 40 MILLIGRAM(S): 20 TABLET, DELAYED RELEASE ORAL at 05:38

## 2024-02-24 NOTE — PROGRESS NOTE ADULT - SUBJECTIVE AND OBJECTIVE BOX
Follow up for anemia, bradycardia     no overnight  events , she has noraml temp , tele with bradycardia NSR   cardiology follow up appreciated     MEDICATIONS  (STANDING):  ARIPiprazole 30 milliGRAM(s) Oral daily  atorvastatin 40 milliGRAM(s) Oral at bedtime  clonazePAM  Tablet 0.5 milliGRAM(s) Oral at bedtime  dextrose 5%. 1000 milliLiter(s) (50 mL/Hr) IV Continuous <Continuous>  dextrose 5%. 1000 milliLiter(s) (100 mL/Hr) IV Continuous <Continuous>  dextrose 50% Injectable 12.5 Gram(s) IV Push once  dextrose 50% Injectable 25 Gram(s) IV Push once  dextrose 50% Injectable 25 Gram(s) IV Push once  enalapril 5 milliGRAM(s) Oral two times a day  glucagon  Injectable 1 milliGRAM(s) IntraMuscular once  influenza  Vaccine (HIGH DOSE) 0.7 milliLiter(s) IntraMuscular once  insulin lispro (ADMELOG) corrective regimen sliding scale   SubCutaneous three times a day before meals  insulin lispro (ADMELOG) corrective regimen sliding scale   SubCutaneous at bedtime  iron sucrose Injectable 200 milliGRAM(s) IV Push every 24 hours  pantoprazole  Injectable 40 milliGRAM(s) IV Push every 12 hours    Vital Signs Last 24 Hrs  T(C): 36.4 (24 Feb 2024 11:17), Max: 36.7 (23 Feb 2024 19:41)  T(F): 97.5 (24 Feb 2024 11:17), Max: 98 (23 Feb 2024 19:41)  HR: 50 (24 Feb 2024 11:17) (49 - 61)  BP: 113/62 (24 Feb 2024 11:17) (96/60 - 139/75)  BP(mean): --  RR: 19 (24 Feb 2024 11:17) (19 - 20)  SpO2: 97% (24 Feb 2024 11:17) (92% - 97%)    Parameters below as of 24 Feb 2024 11:17  Patient On (Oxygen Delivery Method): room air          CONSTITUTIONAL: NAD, obese  RESPIRATORY: Normal respiratory effort; lungs are clear to auscultation bilaterally  CARDIOVASCULAR: Regular rate and rhythm, normal S1 and S2, ; No lower extremity edema;  ABDOMEN: Nontender to palpation, normoactive bowel sounds, no rebound/guarding;   MUSCLOSKELETAL:  decreased rom in right shoulder  NEUROLOGY: CN 2-12 are intact and symmetric; no gross sensory deficits;                             8.7    4.98  )-----------( 239      ( 24 Feb 2024 06:50 )             27.5   02-24    145  |  107  |  33.8<H>  ----------------------------<  80  3.5   |  26.0  |  0.85    Ca    8.4      24 Feb 2024 06:50  Phos  4.1     02-23  Mg     2.4     02-24    TPro  5.6<L>  /  Alb  3.1<L>  /  TBili  1.0  /  DBili  x   /  AST  18  /  ALT  16  /  AlkPhos  148<H>  02-24      LABS:                        7.2    x     )-----------( x        ( 23 Feb 2024 07:49 )             22.5     02-23    144  |  105  |  28.1<H>  ----------------------------<  91  3.8   |  28.0  |  1.04    Ca    8.6      23 Feb 2024 02:15  Phos  4.1     02-23  Mg     2.2     02-23    TPro  5.5<L>  /  Alb  3.1<L>  /  TBili  0.9  /  DBili  x   /  AST  17  /  ALT  17  /  AlkPhos  147<H>  02-23    PT/INR - ( 23 Feb 2024 02:15 )   PT: 15.4 sec;   INR: 1.40 ratio         PTT - ( 23 Feb 2024 02:15 )  PTT:49.9 sec      Urinalysis Basic - ( 23 Feb 2024 02:15 )    Color: x / Appearance: x / SG: x / pH: x  Gluc: 91 mg/dL / Ketone: x  / Bili: x / Urobili: x   Blood: x / Protein: x / Nitrite: x   Leuk Esterase: x / RBC: x / WBC x   Sq Epi: x / Non Sq Epi: x / Bacteria: x      CAPILLARY BLOOD GLUCOSE      POCT Blood Glucose.: 96 mg/dL (24 Feb 2024 08:54)  POCT Blood Glucose.: 100 mg/dL (23 Feb 2024 21:35)  POCT Blood Glucose.: 129 mg/dL (23 Feb 2024 17:30)  POCT Blood Glucose.: 118 mg/dL (23 Feb 2024 12:08)

## 2024-02-24 NOTE — PROGRESS NOTE ADULT - ASSESSMENT
78 year old female with Hypertension, Dyslipidemia, Diabetes, COPD and Atrial Fibrillation on anticoagulation presents with Right arm pain and swelling and bruising. Daily ASA, Xarelto and now with Motrin for right shoulder pain  Anemia (Hgb 7.6), Elevated . Chest X-Ray with increased interstitial marking as well as inferior dislocation of right shoulder. EKG Sinus bradycardia    1- Anemia, likely due to slow ongoing upper GI loss  Iron deficiency  s/p blood tx cont to monitor   tx if needed   EGD on Monday   cont PPI pantaprazole bid iv   - EGD cancelled due to hypothermia on 2/23   - cont to hold  Rivaroxaban and ASA   - No NSAIDS  - GI following    2- Sinus bradycardia with h/o PAF   sop metoprolol   monitor on tele     3-Hypothermia overnight  - follow blood cx  resolved     4- Dislocation, Right Shoulder  - Acetaminophen 975mg q8  - Ortho consulted, [place], [facility], follow-up outpatient with Dr. Hernández, No acute orthopedic surgical intervention  d/w orthopedics pet daughter request today re relocation ; multiple attempt with pain meds 2/22 no success   may be cronic and may need open reduction anesthesia which is not urgent       5- T2 Diabetes Mellitus, borderline   controlled   - Hold oral hypoglycemic agent  - Blood glucose monitoring with sliding scale Lispro  - A1c 5.0%    6- Dyslipidemia  - Statin    7 COPD  - PRN MING    VTE prophylaxis  - VCD    Dc in few days pending EGD clinical progress

## 2024-02-24 NOTE — PROGRESS NOTE ADULT - SUBJECTIVE AND OBJECTIVE BOX
S: Patient denies chest pain or dyspnea    TELEMETRY: sr    MEDICATIONS  (STANDING):  ARIPiprazole 30 milliGRAM(s) Oral daily  atorvastatin 40 milliGRAM(s) Oral at bedtime  clonazePAM  Tablet 0.5 milliGRAM(s) Oral at bedtime  dextrose 5%. 1000 milliLiter(s) (50 mL/Hr) IV Continuous <Continuous>  dextrose 5%. 1000 milliLiter(s) (100 mL/Hr) IV Continuous <Continuous>  dextrose 50% Injectable 12.5 Gram(s) IV Push once  dextrose 50% Injectable 25 Gram(s) IV Push once  dextrose 50% Injectable 25 Gram(s) IV Push once  enalapril 5 milliGRAM(s) Oral two times a day  glucagon  Injectable 1 milliGRAM(s) IntraMuscular once  influenza  Vaccine (HIGH DOSE) 0.7 milliLiter(s) IntraMuscular once  insulin lispro (ADMELOG) corrective regimen sliding scale   SubCutaneous three times a day before meals  insulin lispro (ADMELOG) corrective regimen sliding scale   SubCutaneous at bedtime  iron sucrose Injectable 200 milliGRAM(s) IV Push every 24 hours  pantoprazole  Injectable 40 milliGRAM(s) IV Push every 12 hours    MEDICATIONS  (PRN):  acetaminophen     Tablet .. 975 milliGRAM(s) Oral every 8 hours PRN Mild Pain (1 - 3), Moderate Pain (4 - 6)  albuterol    90 MICROgram(s) HFA Inhaler 2 Puff(s) Inhalation every 6 hours PRN Shortness of Breath and/or Wheezing  dextrose Oral Gel 15 Gram(s) Oral once PRN Blood Glucose LESS THAN 70 milliGRAM(s)/deciliter        Vital Signs Last 24 Hrs  T(C): 36.4 (24 Feb 2024 08:00), Max: 36.7 (23 Feb 2024 19:41)  T(F): 97.5 (24 Feb 2024 08:00), Max: 98 (23 Feb 2024 19:41)  HR: 56 (24 Feb 2024 08:00) (49 - 61)  BP: 122/59 (24 Feb 2024 08:00) (96/60 - 139/75)  BP(mean): --  RR: 19 (24 Feb 2024 08:00) (19 - 20)  SpO2: 93% (24 Feb 2024 08:00) (92% - 94%)    Parameters below as of 24 Feb 2024 08:00  Patient On (Oxygen Delivery Method): room air        Daily     Daily     I&O's Detail      PHYSICAL EXAM:  Appearance: In NAD  Neck: No JVD,   Cardiovascular: Normal S1 S2  Respiratory: Lungs clear to auscultation	  Gastrointestinal:  Soft, Non-tender, + BS, no bruits	  Neurologic: Grossly non-focal.  Extremities: No edema    LABS:                        8.7    4.98  )-----------( 239      ( 24 Feb 2024 06:50 )             27.5     02-24    145  |  107  |  33.8<H>  ----------------------------<  80  3.5   |  26.0  |  0.85    Ca    8.4      24 Feb 2024 06:50  Phos  4.1     02-23  Mg     2.4     02-24    TPro  5.6<L>  /  Alb  3.1<L>  /  TBili  1.0  /  DBili  x   /  AST  18  /  ALT  16  /  AlkPhos  148<H>  02-24        PT/INR - ( 23 Feb 2024 02:15 )   PT: 15.4 sec;   INR: 1.40 ratio         PTT - ( 23 Feb 2024 02:15 )  PTT:49.9 sec  Urinalysis Basic - ( 24 Feb 2024 06:50 )    Color: x / Appearance: x / SG: x / pH: x  Gluc: 80 mg/dL / Ketone: x  / Bili: x / Urobili: x   Blood: x / Protein: x / Nitrite: x   Leuk Esterase: x / RBC: x / WBC x   Sq Epi: x / Non Sq Epi: x / Bacteria: x

## 2024-02-24 NOTE — PROGRESS NOTE ADULT - ASSESSMENT
78 year old female former smoker with  PMH of Hypertension, HLD, Diabetes, COPD and Atrial Fibrillation (S/p A-fib ablation with Dr Crowley 6/2023) with continued episodes of paroxysmal A-Fib and remains on Xarelto  Office Echo 2/28/2023 shows Atrial Fibrillation, Normal LVEF 55-60%, Mild-Moderate MR, Mild TR, LHC 3/2023  shows Normal LVEF 55%, Mild to moderate stenosis of the MID LAD and OM3, No AS, Very Mild MR  She was admitted  02/22/2024  1.  Dislocation, Right Shoulder  2. Anemia, s/p transfusion  3. Transient hypothermia  4. Paroxysmal atrial fibrillation currently with sinus bradycardia      Plan:   - EGD cancelled due to hypothermia, EGD/colonoscopy when medically stable  - Hod Rivaroxaban and ASA for now.  - Echo pending  - No NSAIDS  - Hold CCB and Amiodarone due to bradycardia  - Ortho consulted, [place], [facility], follow-up outpatient with Dr. Hernández, No acute orthopedic surgical intervention  Dr. Yang to resume care Monday AM

## 2024-02-25 ENCOUNTER — TRANSCRIPTION ENCOUNTER (OUTPATIENT)
Age: 79
End: 2024-02-25

## 2024-02-25 LAB
APPEARANCE UR: CLEAR — SIGNIFICANT CHANGE UP
BACTERIA # UR AUTO: ABNORMAL /HPF
BILIRUB UR-MCNC: ABNORMAL
CAST: 0 /LPF — SIGNIFICANT CHANGE UP (ref 0–4)
COLOR SPEC: SIGNIFICANT CHANGE UP
DIFF PNL FLD: NEGATIVE — SIGNIFICANT CHANGE UP
GLUCOSE BLDC GLUCOMTR-MCNC: 101 MG/DL — HIGH (ref 70–99)
GLUCOSE BLDC GLUCOMTR-MCNC: 144 MG/DL — HIGH (ref 70–99)
GLUCOSE BLDC GLUCOMTR-MCNC: 94 MG/DL — SIGNIFICANT CHANGE UP (ref 70–99)
GLUCOSE UR QL: NEGATIVE MG/DL — SIGNIFICANT CHANGE UP
KETONES UR-MCNC: NEGATIVE MG/DL — SIGNIFICANT CHANGE UP
LEUKOCYTE ESTERASE UR-ACNC: ABNORMAL
NITRITE UR-MCNC: NEGATIVE — SIGNIFICANT CHANGE UP
PH UR: 6.5 — SIGNIFICANT CHANGE UP (ref 5–8)
PROT UR-MCNC: SIGNIFICANT CHANGE UP MG/DL
RBC CASTS # UR COMP ASSIST: 2 /HPF — SIGNIFICANT CHANGE UP (ref 0–4)
SP GR SPEC: 1.02 — SIGNIFICANT CHANGE UP (ref 1–1.03)
SQUAMOUS # UR AUTO: 10 /HPF — HIGH (ref 0–5)
TSH SERPL-MCNC: 8.75 UIU/ML — HIGH (ref 0.27–4.2)
UROBILINOGEN FLD QL: 2 MG/DL (ref 0.2–1)
WBC UR QL: 1 /HPF — SIGNIFICANT CHANGE UP (ref 0–5)

## 2024-02-25 PROCEDURE — 99232 SBSQ HOSP IP/OBS MODERATE 35: CPT

## 2024-02-25 RX ORDER — NYSTATIN CREAM 100000 [USP'U]/G
1 CREAM TOPICAL
Refills: 0 | Status: DISCONTINUED | OUTPATIENT
Start: 2024-02-25 | End: 2024-03-02

## 2024-02-25 RX ADMIN — Medication 5 MILLIGRAM(S): at 05:29

## 2024-02-25 RX ADMIN — PANTOPRAZOLE SODIUM 40 MILLIGRAM(S): 20 TABLET, DELAYED RELEASE ORAL at 18:34

## 2024-02-25 RX ADMIN — ARIPIPRAZOLE 30 MILLIGRAM(S): 15 TABLET ORAL at 18:34

## 2024-02-25 RX ADMIN — NYSTATIN CREAM 1 APPLICATION(S): 100000 CREAM TOPICAL at 15:48

## 2024-02-25 RX ADMIN — PANTOPRAZOLE SODIUM 40 MILLIGRAM(S): 20 TABLET, DELAYED RELEASE ORAL at 05:31

## 2024-02-25 NOTE — PROGRESS NOTE ADULT - ASSESSMENT
78 year old female former smoker with  PMH of Hypertension, HLD, Diabetes, COPD and Atrial Fibrillation (S/p A-fib ablation with Dr Crowley 6/2023) with continued episodes of paroxysmal A-Fib and remains on Xarelto  Office Echo 2/28/2023 shows Atrial Fibrillation, Normal LVEF 55-60%, Mild-Moderate MR, Mild TR, LHC 3/2023  shows Normal LVEF 55%, Mild to moderate stenosis of the MID LAD and OM3, No AS, Very Mild MR  She was admitted  02/22/2024  1.  Dislocation, Right Shoulder  2. Anemia, s/p transfusion  3. Transient hypothermia  4. Paroxysmal atrial fibrillation currently with sinus bradycardia  5. Normal LV function. EF - 67% basal IL hypokinesis, Normal RV function and size. TROY severely dilated. Mild to mod MR. RVSP - 58 mmHg c/s mod PHTN.       Plan:   - EGD cancelled due to hypothermia  Patient can proceed with EGD/colonoscopy from a cardiac viewpoint when medically stable  - Hold Rivaroxaban and ASA for now.  - No NSAIDS  - Hold CCB and Amiodarone due to bradycardia  - Ortho consulted, [place], [facility]IN[facility] PERE[facility], follow-up outpatient with Dr. Hernández, No acute orthopedic surgical intervention  Dr. Yang to resume care Monday AM

## 2024-02-25 NOTE — PROGRESS NOTE ADULT - SUBJECTIVE AND OBJECTIVE BOX
[facility] Division of Hospital Medicine    Chief Complaint:  right shoulder pain    SUBJECTIVE / OVERNIGHT EVENTS:    Pt seen and examined at bedside. Pt c/o right shoulder pain. Denies CP, SOB, N/V/D, abd pain, fever, chills. Rest of ROS (-).     MEDICATIONS  (STANDING):  ARIPiprazole 30 milliGRAM(s) Oral daily  atorvastatin 40 milliGRAM(s) Oral at bedtime  clonazePAM  Tablet 0.5 milliGRAM(s) Oral at bedtime  dextrose 5%. 1000 milliLiter(s) (50 mL/Hr) IV Continuous <Continuous>  dextrose 5%. 1000 milliLiter(s) (100 mL/Hr) IV Continuous <Continuous>  dextrose 50% Injectable 25 Gram(s) IV Push once  dextrose 50% Injectable 12.5 Gram(s) IV Push once  dextrose 50% Injectable 25 Gram(s) IV Push once  enalapril 5 milliGRAM(s) Oral daily  glucagon  Injectable 1 milliGRAM(s) IntraMuscular once  influenza  Vaccine (HIGH DOSE) 0.7 milliLiter(s) IntraMuscular once  insulin lispro (ADMELOG) corrective regimen sliding scale   SubCutaneous three times a day before meals  insulin lispro (ADMELOG) corrective regimen sliding scale   SubCutaneous at bedtime  pantoprazole  Injectable 40 milliGRAM(s) IV Push every 12 hours    MEDICATIONS  (PRN):  acetaminophen     Tablet .. 975 milliGRAM(s) Oral every 8 hours PRN Mild Pain (1 - 3), Moderate Pain (4 - 6)  albuterol    90 MICROgram(s) HFA Inhaler 2 Puff(s) Inhalation every 6 hours PRN Shortness of Breath and/or Wheezing  dextrose Oral Gel 15 Gram(s) Oral once PRN Blood Glucose LESS THAN 70 milliGRAM(s)/deciliter        I&O's Summary      PHYSICAL EXAM:  Vital Signs Last 24 Hrs  T(C): 36.3 (2024 10:30), Max: 36.9 (2024 05:05)  T(F): 97.4 (2024 10:30), Max: 98.4 (2024 05:05)  HR: 56 (2024 10:30) (54 - 61)  BP: 115/68 (2024 10:30) (115/68 - 144/81)  BP(mean): --  RR: 18 (2024 10:30) (18 - 19)  SpO2: 98% (2024 10:30) (95% - 99%)    Parameters below as of 2024 10:30  Patient On (Oxygen Delivery Method): room air          Physical exam:  General: Age-appearing, obese F, in no acute distress  Head: Normocephalic, atraumatic  ENMT: EOMI, no scleral icterus, neck supple, no JVD  Cardiovascular: +S1, S2; Regular rate and rhythm, no murmurs.  Respiratory: CTAB, no wheezing, no rales, no rhonchi  Gastrointestinal: soft, ND, NT, (+) BS, (-) rebound  Neuro: AAOx3, CN2-12 intact, no FND  Musculoskeletal: decreased rom in right shoulder  Skin: erythematous rash on left side of abdomen, noted in folds as well.   Psych: Calm, cooperative     LABS:                        8.7    4.98  )-----------( 239      ( 2024 06:50 )             27.5     02    145  |  107  |  33.8<H>  ----------------------------<  80  3.5   |  26.0  |  0.85    Ca    8.4      2024 06:50  Mg     2.4         TPro  5.6<L>  /  Alb  3.1<L>  /  TBili  1.0  /  DBili  x   /  AST  18  /  ALT  16  /  AlkPhos  148<H>            Urinalysis Basic - ( 2024 18:00 )    Color: Dark Yellow / Appearance: Clear / S.024 / pH: x  Gluc: x / Ketone: Negative mg/dL  / Bili: Small / Urobili: 2.0 mg/dL   Blood: x / Protein: Trace mg/dL / Nitrite: Negative   Leuk Esterase: Trace / RBC: 2 /HPF / WBC 1 /HPF   Sq Epi: x / Non Sq Epi: 10 /HPF / Bacteria: Occasional /HPF        Culture - Blood (collected 2024 02:20)  Source: .Blood Blood-Peripheral  Preliminary Report (2024 07:01):    No growth at 48 Hours    Culture - Blood (collected 2024 02:15)  Source: .Blood Blood-Peripheral  Preliminary Report (2024 07:01):    No growth at 48 Hours      CAPILLARY BLOOD GLUCOSE      POCT Blood Glucose.: 94 mg/dL (2024 08:47)  POCT Blood Glucose.: 139 mg/dL (2024 22:51)  POCT Blood Glucose.: 112 mg/dL (2024 17:42)        RADIOLOGY & ADDITIONAL TESTS:  Results Reviewed:   Imaging Personally Reviewed:  Electrocardiogram Personally Reviewed:

## 2024-02-25 NOTE — PROGRESS NOTE ADULT - ASSESSMENT
78 year old female with a history of CAD, COPD, Afib who presented with anemia      Anemia  HBG stable  No evidence of overt GIB  Plan for EGD tomorrow as long as she remains HD stable to evaluate for UGI source in light of nsaid use  NPO after midnight  HGB >8, PLT >50, INR <1.5 prior to procedure  Consider sending TSH for hypothermia   Continue PPI BID  Avoid nsaids

## 2024-02-25 NOTE — PROGRESS NOTE ADULT - ASSESSMENT
78 year old female with Hypertension, Dyslipidemia, Diabetes, COPD and Atrial Fibrillation on anticoagulation presents with Right arm pain and swelling and bruising. Daily ASA, Xarelto and now with Motrin for right shoulder pain  Anemia (Hgb 7.6), Elevated . Chest X-Ray with increased interstitial marking as well as inferior dislocation of right shoulder. EKG Sinus bradycardia    #Iron def. anemia  Likely due to GIB in setting of chronic NSAID use.  No signs of overt GI bleed.   BUN >30, likely upper source.   EGD cancelled due to hypothermia on 2/23.  - Monitor H/H  - Transfuse to keep Hb>7, if clinically indicated   - cont to hold Rivaroxaban and ASA   - Avoid NSAIDS  - C/w PPI 40mg IV q12  - GI consulted - recs noted, plan for EGD tomorrow 2/26, NPO at midnight    #Sinus bradycardia with h/o PAF   - Cardio consulted - recs noted  - Monitor on tele   - C/t hold CCB and Amio per Cardio recs    #Right shoulder dislocation  - Tylenol q8 prn   - Ortho consulted - recommending [facility], follow-up outpatient with Dr. Hernández, No acute orthopedic surgical intervention  - Family requesting to speak to Ortho for possible relocation although multiple attempts with pain meds 2/22 no success, will reach out to Ortho, may be chronic and may need open reduction anesthesia which is not urgent     #Dermatitis  Likely candida, rash involving folds/creases.   - Start Nystatin cream    #Hypothermia (2/23) - RESOLVED.   #Subclinical hypothyroidism  - Blood cx - NGTD.  - TSH is high (10.68), free T4 is normal (1.3).   - Repeat TSH and Free T4    #NIDDM2  controlled.   - Hold oral hypoglycemic agent  - Blood glucose monitoring with sliding scale Lispro  - A1c 5.0%    #Dyslipidemia  - Statin    #COPD  - PRN MING    VTE prophylaxis  - VCD    Dispo: pending EGD tomorrow       78 year old female with Hypertension, Dyslipidemia, Diabetes, COPD and Atrial Fibrillation on anticoagulation presents with Right arm pain and swelling and bruising. Daily ASA, Xarelto and now with Motrin for right shoulder pain  Anemia (Hgb 7.6), Elevated . Chest X-Ray with increased interstitial marking as well as inferior dislocation of right shoulder. EKG Sinus bradycardia    #Iron def. anemia  Likely due to GIB in setting of chronic NSAID use.  No signs of overt GI bleed.   BUN >30, likely upper source.   EGD cancelled due to hypothermia on 2/23.  - Monitor H/H  - Transfuse to keep Hb>7, if clinically indicated   - cont to hold Rivaroxaban and ASA   - Avoid NSAIDS  - C/w PPI 40mg IV q12  - Holding PO Iron supplementation until after EGD  - GI consulted - recs noted, plan for EGD tomorrow 2/26, NPO at midnight    #Sinus bradycardia with h/o PAF   - Cardio consulted - recs noted  - Monitor on tele   - C/t hold CCB and Amio per Cardio recs    #Right shoulder dislocation  - Tylenol q8 prn   - Ortho consulted - recommending [facility], follow-up outpatient with Dr. Hernández, No acute orthopedic surgical intervention  - Family requesting to speak to Ortho for possible relocation although multiple attempts with pain meds 2/22 no success, will reach out to Ortho, may be chronic and may need open reduction anesthesia which is not urgent     #Dermatitis  Likely candida, rash involving folds/creases.   - Start Nystatin cream    #Hypothermia (2/23) - RESOLVED.   #Subclinical hypothyroidism  - Blood cx - NGTD.  - TSH is high (10.68), free T4 is normal (1.3).   - Repeat TSH and Free T4    #NIDDM2  controlled.   - Hold oral hypoglycemic agent  - Blood glucose monitoring with sliding scale Lispro  - A1c 5.0%    #Dyslipidemia  - Statin    #COPD  - PRN MING    VTE prophylaxis  - VCD    Dispo: pending EGD tomorrow

## 2024-02-25 NOTE — PROGRESS NOTE ADULT - SUBJECTIVE AND OBJECTIVE BOX
TELEMETRY:    MEDICATIONS  (STANDING):  ARIPiprazole 30 milliGRAM(s) Oral daily  atorvastatin 40 milliGRAM(s) Oral at bedtime  clonazePAM  Tablet 0.5 milliGRAM(s) Oral at bedtime  dextrose 5%. 1000 milliLiter(s) (50 mL/Hr) IV Continuous <Continuous>  dextrose 5%. 1000 milliLiter(s) (100 mL/Hr) IV Continuous <Continuous>  dextrose 50% Injectable 25 Gram(s) IV Push once  dextrose 50% Injectable 12.5 Gram(s) IV Push once  dextrose 50% Injectable 25 Gram(s) IV Push once  enalapril 5 milliGRAM(s) Oral daily  glucagon  Injectable 1 milliGRAM(s) IntraMuscular once  influenza  Vaccine (HIGH DOSE) 0.7 milliLiter(s) IntraMuscular once  insulin lispro (ADMELOG) corrective regimen sliding scale   SubCutaneous three times a day before meals  insulin lispro (ADMELOG) corrective regimen sliding scale   SubCutaneous at bedtime  pantoprazole  Injectable 40 milliGRAM(s) IV Push every 12 hours    MEDICATIONS  (PRN):  acetaminophen     Tablet .. 975 milliGRAM(s) Oral every 8 hours PRN Mild Pain (1 - 3), Moderate Pain (4 - 6)  albuterol    90 MICROgram(s) HFA Inhaler 2 Puff(s) Inhalation every 6 hours PRN Shortness of Breath and/or Wheezing  dextrose Oral Gel 15 Gram(s) Oral once PRN Blood Glucose LESS THAN 70 milliGRAM(s)/deciliter        Vital Signs Last 24 Hrs  T(C): 36.9 (2024 05:05), Max: 36.9 (2024 05:05)  T(F): 98.4 (2024 05:05), Max: 98.4 (2024 05:05)  HR: 61 (2024 05:05) (50 - 61)  BP: 142/84 (2024 05:05) (113/62 - 144/81)  BP(mean): --  RR: 18 (2024 05:05) (18 - 19)  SpO2: 95% (2024 05:05) (95% - 99%)    Parameters below as of 2024 05:05  Patient On (Oxygen Delivery Method): room air        Daily     Daily     I&O's Detail      PHYSICAL EXAM:  Appearance: In NAD  Neck: No JVD,   Cardiovascular: Normal S1 S2  Respiratory: Lungs clear to auscultation	  Gastrointestinal:  Soft, Non-tender, + BS, no bruits	  Neurologic: Grossly non-focal.  Extremities: No edema    LABS:                        8.7    4.98  )-----------( 239      ( 2024 06:50 )             27.5         145  |  107  |  33.8<H>  ----------------------------<  80  3.5   |  26.0  |  0.85    Ca    8.4      2024 06:50  Mg     2.4         TPro  5.6<L>  /  Alb  3.1<L>  /  TBili  1.0  /  DBili  x   /  AST  18  /  ALT  16  /  AlkPhos  148<H>            Urinalysis Basic - ( 2024 18:00 )    Color: Dark Yellow / Appearance: Clear / S.024 / pH: x  Gluc: x / Ketone: Negative mg/dL  / Bili: Small / Urobili: 2.0 mg/dL   Blood: x / Protein: Trace mg/dL / Nitrite: Negative   Leuk Esterase: Trace / RBC: 2 /HPF / WBC 1 /HPF   Sq Epi: x / Non Sq Epi: 10 /HPF / Bacteria: Occasional /HPF      Echo (2024)  CONCLUSIONS:  1. Left ventricular cavity is mildly dilated. Left ventricular systolic function is normal with an ejection   fraction of 67 % by Cleaning's method of disks. Regional wall motion abnormalities present.  2. Basal inferolateral segment is abnormal.   3. The left ventricular diastolic function is indeterminate, with elevated filling pressure.  4. Normal right ventricular cavity size and normal systolic function.  5. Right ventricular free wall strain is --28 %. Right ventricular four chamber strain is --20.9 %.  6. The left atrium is moderately dilated.  7. The right atrium is severely dilated.  8. Mild to moderate mitral regurgitation.  9. No pericardial effusion seen.  10. Estimated pulmonary artery systolic pressure is 58 mmHg, consistent with moderate pulmonary   hypertension.  11. Left ventricular global longitudinal strain is -20.3 % which is normal (< -18%). Images were acquired on   a [employer] ultrasound system and processed on the ultrasound machine with a heart rate of 48 bpm and a   blood pressure of 95/58 mmHg.  12. Mild to moderate pulmonic regurgitation.  13. Moderate tricuspid regurgitation. Normal hepatic vein flow.  14. Severe left ventricular hypertrophy.  15. The inferior vena cava is dilated measuring 2.40 cm in diameter, (dilated >2.1cm) with normal   inspiratory collapse (normal >50%) consistent with mildly elevated right atrial pressure (~8, range 5-  10mmHg).

## 2024-02-25 NOTE — PROGRESS NOTE ADULT - SUBJECTIVE AND OBJECTIVE BOX
Chief Complaint:  Patient is a 78y old  Female who presents with a chief complaint of My arm hurts and is bruised and swollen  Anemia (2024 09:56)      Interval Events / Subjective: Patient seen and examined at bedside. She reports mild LLQ pain and states she has not had a bowel movement since admission, therefore it is unlikely that she is bleeding. She denies nausea or vomiting. She is tolerating a regular diet. VSS.         MEDICATIONS:   MEDICATIONS  (STANDING):  ARIPiprazole 30 milliGRAM(s) Oral daily  atorvastatin 40 milliGRAM(s) Oral at bedtime  clonazePAM  Tablet 0.5 milliGRAM(s) Oral at bedtime  dextrose 5%. 1000 milliLiter(s) (50 mL/Hr) IV Continuous <Continuous>  dextrose 5%. 1000 milliLiter(s) (100 mL/Hr) IV Continuous <Continuous>  dextrose 50% Injectable 25 Gram(s) IV Push once  dextrose 50% Injectable 12.5 Gram(s) IV Push once  dextrose 50% Injectable 25 Gram(s) IV Push once  enalapril 5 milliGRAM(s) Oral daily  glucagon  Injectable 1 milliGRAM(s) IntraMuscular once  influenza  Vaccine (HIGH DOSE) 0.7 milliLiter(s) IntraMuscular once  insulin lispro (ADMELOG) corrective regimen sliding scale   SubCutaneous three times a day before meals  insulin lispro (ADMELOG) corrective regimen sliding scale   SubCutaneous at bedtime  pantoprazole  Injectable 40 milliGRAM(s) IV Push every 12 hours    MEDICATIONS  (PRN):  acetaminophen     Tablet .. 975 milliGRAM(s) Oral every 8 hours PRN Mild Pain (1 - 3), Moderate Pain (4 - 6)  albuterol    90 MICROgram(s) HFA Inhaler 2 Puff(s) Inhalation every 6 hours PRN Shortness of Breath and/or Wheezing  dextrose Oral Gel 15 Gram(s) Oral once PRN Blood Glucose LESS THAN 70 milliGRAM(s)/deciliter      ALLERGIES:   Allergies    penicillins (Unknown)  doxycycline (Unknown)    Intolerances        VITAL SIGNS:   Vital Signs Last 24 Hrs  T(C): 36.9 (2024 05:05), Max: 36.9 (2024 05:05)  T(F): 98.4 (2024 05:05), Max: 98.4 (2024 05:05)  HR: 61 (2024 05:05) (50 - 61)  BP: 142/84 (2024 05:05) (113/62 - 144/81)  BP(mean): --  RR: 18 (2024 05:05) (18 - 19)  SpO2: 95% (2024 05:05) (95% - 99%)    Parameters below as of 2024 05:05  Patient On (Oxygen Delivery Method): room air      I&O's Summary      PHYSICAL EXAM:   GENERAL:  No acute distress  HEENT:  NC/AT,  conjunctivae clear, sclera -anicteric  CHEST:  Full & symmetric excursion, no increased effort, breath sounds clear  HEART:  Regular rate  ABDOMEN:  Soft, non-tender, non-distended, normoactive bowel sounds,  no rebound or guarding  EXTREMITIES:  edema  NEURO:  calm, cooperative    LABS:  CBC Full  -  ( 2024 06:50 )  WBC Count : 4.98 K/uL  RBC Count : 3.06 M/uL  Hemoglobin : 8.7 g/dL  Hematocrit : 27.5 %  Platelet Count - Automated : 239 K/uL  Mean Cell Volume : 89.9 fl  Mean Cell Hemoglobin : 28.4 pg  Mean Cell Hemoglobin Concentration : 31.6 gm/dL  Auto Neutrophil # : 3.21 K/uL  Auto Lymphocyte # : 0.77 K/uL  Auto Monocyte # : 0.72 K/uL  Auto Eosinophil # : 0.20 K/uL  Auto Basophil # : 0.04 K/uL  Auto Neutrophil % : 64.4 %  Auto Lymphocyte % : 15.5 %  Auto Monocyte % : 14.5 %  Auto Eosinophil % : 4.0 %  Auto Basophil % : 0.8 %        145  |  107  |  33.8<H>  ----------------------------<  80  3.5   |  26.0  |  0.85    Ca    8.4      2024 06:50  Mg     2.4     -    TPro  5.6<L>  /  Alb  3.1<L>  /  TBili  1.0  /  DBili  x   /  AST  18  /  ALT  16  /  AlkPhos  148<H>      LIVER FUNCTIONS - ( 2024 06:50 )  Alb: 3.1 g/dL / Pro: 5.6 g/dL / ALK PHOS: 148 U/L / ALT: 16 U/L / AST: 18 U/L / GGT: x             Urinalysis Basic - ( 2024 18:00 )    Color: Dark Yellow / Appearance: Clear / S.024 / pH: x  Gluc: x / Ketone: Negative mg/dL  / Bili: Small / Urobili: 2.0 mg/dL   Blood: x / Protein: Trace mg/dL / Nitrite: Negative   Leuk Esterase: Trace / RBC: 2 /HPF / WBC 1 /HPF   Sq Epi: x / Non Sq Epi: 10 /HPF / Bacteria: Occasional /HPF        Culture - Blood (collected 2024 02:20)  Source: .Blood Blood-Peripheral  Preliminary Report (2024 07:01):    No growth at 48 Hours    Culture - Blood (collected 2024 02:15)  Source: .Blood Blood-Peripheral  Preliminary Report (2024 07:01):    No growth at 48 Hours        RADIOLOGY & ADDITIONAL STUDIES (The following images were personally reviewed):

## 2024-02-26 LAB
ANION GAP SERPL CALC-SCNC: 10 MMOL/L — SIGNIFICANT CHANGE UP (ref 5–17)
BUN SERPL-MCNC: 23.9 MG/DL — HIGH (ref 8–20)
CALCIUM SERPL-MCNC: 8.7 MG/DL — SIGNIFICANT CHANGE UP (ref 8.4–10.5)
CHLORIDE SERPL-SCNC: 108 MMOL/L — SIGNIFICANT CHANGE UP (ref 96–108)
CO2 SERPL-SCNC: 28 MMOL/L — SIGNIFICANT CHANGE UP (ref 22–29)
CREAT SERPL-MCNC: 0.58 MG/DL — SIGNIFICANT CHANGE UP (ref 0.5–1.3)
EGFR: 94 ML/MIN/1.73M2 — SIGNIFICANT CHANGE UP
GLUCOSE BLDC GLUCOMTR-MCNC: 101 MG/DL — HIGH (ref 70–99)
GLUCOSE BLDC GLUCOMTR-MCNC: 102 MG/DL — HIGH (ref 70–99)
GLUCOSE BLDC GLUCOMTR-MCNC: 108 MG/DL — HIGH (ref 70–99)
GLUCOSE BLDC GLUCOMTR-MCNC: 89 MG/DL — SIGNIFICANT CHANGE UP (ref 70–99)
GLUCOSE SERPL-MCNC: 86 MG/DL — SIGNIFICANT CHANGE UP (ref 70–99)
HCT VFR BLD CALC: 29.3 % — LOW (ref 34.5–45)
HGB BLD-MCNC: 9 G/DL — LOW (ref 11.5–15.5)
INR BLD: 1.04 RATIO — SIGNIFICANT CHANGE UP (ref 0.85–1.18)
MCHC RBC-ENTMCNC: 28 PG — SIGNIFICANT CHANGE UP (ref 27–34)
MCHC RBC-ENTMCNC: 30.7 GM/DL — LOW (ref 32–36)
MCV RBC AUTO: 91 FL — SIGNIFICANT CHANGE UP (ref 80–100)
PLATELET # BLD AUTO: 254 K/UL — SIGNIFICANT CHANGE UP (ref 150–400)
POTASSIUM SERPL-MCNC: 3.9 MMOL/L — SIGNIFICANT CHANGE UP (ref 3.5–5.3)
POTASSIUM SERPL-SCNC: 3.9 MMOL/L — SIGNIFICANT CHANGE UP (ref 3.5–5.3)
PROTHROM AB SERPL-ACNC: 11.5 SEC — SIGNIFICANT CHANGE UP (ref 9.5–13)
RBC # BLD: 3.22 M/UL — LOW (ref 3.8–5.2)
RBC # FLD: 16.2 % — HIGH (ref 10.3–14.5)
SODIUM SERPL-SCNC: 146 MMOL/L — HIGH (ref 135–145)
T4 FREE SERPL-MCNC: 1.2 NG/DL — SIGNIFICANT CHANGE UP (ref 0.9–1.8)
WBC # BLD: 6.41 K/UL — SIGNIFICANT CHANGE UP (ref 3.8–10.5)
WBC # FLD AUTO: 6.41 K/UL — SIGNIFICANT CHANGE UP (ref 3.8–10.5)

## 2024-02-26 PROCEDURE — 43235 EGD DIAGNOSTIC BRUSH WASH: CPT

## 2024-02-26 PROCEDURE — 99232 SBSQ HOSP IP/OBS MODERATE 35: CPT

## 2024-02-26 RX ORDER — SOD SULF/SODIUM/NAHCO3/KCL/PEG
4000 SOLUTION, RECONSTITUTED, ORAL ORAL ONCE
Refills: 0 | Status: COMPLETED | OUTPATIENT
Start: 2024-02-26 | End: 2024-02-26

## 2024-02-26 RX ADMIN — ATORVASTATIN CALCIUM 40 MILLIGRAM(S): 80 TABLET, FILM COATED ORAL at 23:04

## 2024-02-26 RX ADMIN — Medication 0.5 MILLIGRAM(S): at 23:06

## 2024-02-26 RX ADMIN — Medication 0.5 MILLIGRAM(S): at 00:10

## 2024-02-26 RX ADMIN — PANTOPRAZOLE SODIUM 40 MILLIGRAM(S): 20 TABLET, DELAYED RELEASE ORAL at 17:58

## 2024-02-26 RX ADMIN — NYSTATIN CREAM 1 APPLICATION(S): 100000 CREAM TOPICAL at 05:48

## 2024-02-26 RX ADMIN — Medication 4000 MILLILITER(S): at 17:04

## 2024-02-26 RX ADMIN — ATORVASTATIN CALCIUM 40 MILLIGRAM(S): 80 TABLET, FILM COATED ORAL at 00:10

## 2024-02-26 RX ADMIN — NYSTATIN CREAM 1 APPLICATION(S): 100000 CREAM TOPICAL at 18:00

## 2024-02-26 RX ADMIN — PANTOPRAZOLE SODIUM 40 MILLIGRAM(S): 20 TABLET, DELAYED RELEASE ORAL at 05:47

## 2024-02-26 RX ADMIN — Medication 5 MILLIGRAM(S): at 05:47

## 2024-02-26 RX ADMIN — ARIPIPRAZOLE 30 MILLIGRAM(S): 15 TABLET ORAL at 13:41

## 2024-02-26 NOTE — PROGRESS NOTE ADULT - ASSESSMENT
78 year old female with Hypertension, Dyslipidemia, Diabetes, COPD and Atrial Fibrillation on anticoagulation presents with Right arm pain and swelling and bruising. Daily ASA, Xarelto and now with Motrin for right shoulder pain  Anemia (Hgb 7.6), Elevated . Chest X-Ray with increased interstitial marking as well as inferior dislocation of right shoulder. EKG Sinus bradycardia    #Iron def. anemia/ Likely due to GIB in setting of chronic NSAID use.  -No signs of overt GI bleed.   -BUN >30, likely upper source.   -EGD cancelled due to hypothermia on 2/23. now temp nl   - Monitor H/H  - Transfuse to keep Hb>7, if clinically indicated   - cont to hold Rivaroxaban and ASA   - Avoid NSAIDS  - C/w PPI 40mg IV q12  - Holding PO Iron supplementation until after EGD  - GI consulted - recs noted, plan for EGD today  , keep NPO     #Sinus bradycardia with h/o PAF   - Cardio consulted - recs noted  - Monitor on tele   - C/t hold CCB and Amio per Cardio recs    #Right shoulder dislocation  - Tylenol q8 prn   - Ortho consulted - recommending sling, NWDOMINIC PEREZ, follow-up outpatient with Dr. Hernández, No acute orthopedic surgical intervention  - Family requesting  for possible reduction and relocation of shoulder, ortho follow up requested     #Dermatitis  -Likely candida, rash involving folds/creases.   - Start Nystatin cream    #Hypothermia (2/23) - RESOLVED.   #Subclinical hypothyroidism  - Blood cx - NGTD.  - TSH is high (10.68), free T4 is normal (1.3).   - Repeat TSH and Free T4 as op in couple of weeks     #NIDDM2  - A1c 5.0%  - Hold oral hypoglycemic agent  - Blood glucose monitoring with sliding scale Lispro    #Dyslipidemia  - Statin    #COPD  - PRN MING    VTE prophylaxis  - VCD    Dispo: pending EGD today

## 2024-02-26 NOTE — PROGRESS NOTE ADULT - SUBJECTIVE AND OBJECTIVE BOX
INTERVAL HISTORY:  Feeling better  Having Prep for colonoscopy  	  MEDICATIONS:  enalapril 5 milliGRAM(s) Oral daily      albuterol    90 MICROgram(s) HFA Inhaler 2 Puff(s) Inhalation every 6 hours PRN    acetaminophen     Tablet .. 975 milliGRAM(s) Oral every 8 hours PRN  ARIPiprazole 30 milliGRAM(s) Oral daily  clonazePAM  Tablet 0.5 milliGRAM(s) Oral at bedtime    pantoprazole  Injectable 40 milliGRAM(s) IV Push every 12 hours    atorvastatin 40 milliGRAM(s) Oral at bedtime  dextrose 50% Injectable 25 Gram(s) IV Push once  dextrose 50% Injectable 12.5 Gram(s) IV Push once  dextrose 50% Injectable 25 Gram(s) IV Push once  dextrose Oral Gel 15 Gram(s) Oral once PRN  glucagon  Injectable 1 milliGRAM(s) IntraMuscular once  insulin lispro (ADMELOG) corrective regimen sliding scale   SubCutaneous three times a day before meals  insulin lispro (ADMELOG) corrective regimen sliding scale   SubCutaneous at bedtime    dextrose 5%. 1000 milliLiter(s) IV Continuous <Continuous>  dextrose 5%. 1000 milliLiter(s) IV Continuous <Continuous>  influenza  Vaccine (HIGH DOSE) 0.7 milliLiter(s) IntraMuscular once  nystatin Cream 1 Application(s) Topical two times a day        PHYSICAL EXAM:    T(C): 36.4 (02-26-24 @ 20:25), Max: 36.4 (02-26-24 @ 05:39)  HR: 58 (02-26-24 @ 20:25) (58 - 72)  BP: 143/69 (02-26-24 @ 20:25) (128/75 - 152/77)  RR: 18 (02-26-24 @ 20:25) (18 - 19)  SpO2: 97% (02-26-24 @ 20:25) (96% - 100%)  Wt(kg): --    I&O's Summary    25 Feb 2024 07:01  -  26 Feb 2024 07:00  --------------------------------------------------------  IN: 0 mL / OUT: 200 mL / NET: -200 mL        Daily     Daily     Appearance: Normal	  HEENT:   Normal oral mucosa, PERRL, EOMI	  Cardiovascular: Normal S1 S2, No JVD, No murmurs, No edema  Respiratory: Lungs clear to auscultation	  Psychiatry: A & O x 3, Mood & affect appropriate  Gastrointestinal:  Soft, Non-tender, + BS	  Skin: No rashes, No ecchymoses, No cyanosis  Neurologic: Non-focal  Extremities: No edema                              9.0    6.41  )-----------( 254      ( 26 Feb 2024 06:19 )             29.3     02-26    146<H>  |  108  |  23.9<H>  ----------------------------<  86  3.9   |  28.0  |  0.58    Ca    8.7      26 Feb 2024 06:19      proBNP:   Lipid Profile:   HgA1c:     ASSESSMENT/PLAN: 	    8 year old female former smoker with  PMH of Hypertension, HLD, Diabetes, COPD and Atrial Fibrillation (S/p A-fib ablation with Dr Crowley 6/2023) with continued episodes of paroxysmal A-Fib and remains on Xarelto, who presents to ER today with complaints of Right arm pain and increased swelling and bruising. As per patient she hurt her arm approximately 6 weeks ago and had imaging which dtr reported was an Xray and MRI and both studies were reported to be normal. Pt also reports recent few episodes of dark tarry stool in the past few weeks.   Office Echo 2/28/2023 shows Atrial Fibrillation, Normal LVEF 55-60%, Mild-Moderate MR, Mild TR  LHC 3/2023  shows Normal LVEF 55%, Mild to moderate stenosis of the MID LAD and OM3, No AS, Very Mild MR    For colonoscopy tomorrow  No cardiac contraindications

## 2024-02-26 NOTE — PROGRESS NOTE ADULT - SUBJECTIVE AND OBJECTIVE BOX
Patient is a 78y old  Female who presents with a chief complaint of My arm hurts and is bruised and swollen  Anemia (25 Feb 2024 10:27)      Patient seen and examined at bedside. No overnight events reported. comfortable. in no acute distress. no fever     ALLERGIES:  penicillins (Unknown)  doxycycline (Unknown)    MEDICATIONS  (STANDING):  ARIPiprazole 30 milliGRAM(s) Oral daily  atorvastatin 40 milliGRAM(s) Oral at bedtime  clonazePAM  Tablet 0.5 milliGRAM(s) Oral at bedtime  dextrose 5%. 1000 milliLiter(s) (50 mL/Hr) IV Continuous <Continuous>  dextrose 5%. 1000 milliLiter(s) (100 mL/Hr) IV Continuous <Continuous>  dextrose 50% Injectable 12.5 Gram(s) IV Push once  dextrose 50% Injectable 25 Gram(s) IV Push once  dextrose 50% Injectable 25 Gram(s) IV Push once  enalapril 5 milliGRAM(s) Oral daily  glucagon  Injectable 1 milliGRAM(s) IntraMuscular once  influenza  Vaccine (HIGH DOSE) 0.7 milliLiter(s) IntraMuscular once  insulin lispro (ADMELOG) corrective regimen sliding scale   SubCutaneous three times a day before meals  insulin lispro (ADMELOG) corrective regimen sliding scale   SubCutaneous at bedtime  nystatin Cream 1 Application(s) Topical two times a day  pantoprazole  Injectable 40 milliGRAM(s) IV Push every 12 hours  polyethylene glycol/electrolyte Solution. 4000 milliLiter(s) Oral once    MEDICATIONS  (PRN):  acetaminophen     Tablet .. 975 milliGRAM(s) Oral every 8 hours PRN Mild Pain (1 - 3), Moderate Pain (4 - 6)  albuterol    90 MICROgram(s) HFA Inhaler 2 Puff(s) Inhalation every 6 hours PRN Shortness of Breath and/or Wheezing  dextrose Oral Gel 15 Gram(s) Oral once PRN Blood Glucose LESS THAN 70 milliGRAM(s)/deciliter    Vital Signs Last 24 Hrs  T(F): 97.5 (26 Feb 2024 08:35), Max: 97.8 (25 Feb 2024 16:00)  HR: 61 (26 Feb 2024 08:35) (61 - 73)  BP: 128/75 (26 Feb 2024 08:35) (128/75 - 141/78)  RR: 18 (26 Feb 2024 08:35) (18 - 19)  SpO2: 100% (26 Feb 2024 08:35) (95% - 100%)  I&O's Summary    25 Feb 2024 07:01  -  26 Feb 2024 07:00  --------------------------------------------------------  IN: 0 mL / OUT: 200 mL / NET: -200 mL      PHYSICAL EXAM:  General: NAD, A/O x 3, comfortable, in no acute distress.   ENT: MMM, no thrush  Neck: Supple, No JVD  Lungs: Clear to auscultation bilaterally, good air entry, non-labored breathing  Cardio: RRR, S1/S2, No murmur  Abdomen: Soft, Nontender, Nondistended; Bowel sounds present  Extremities: rt shoulder deformity noted with limited ROM     LABS:                        9.0    6.41  )-----------( 254      ( 26 Feb 2024 06:19 )             29.3     02-26    146  |  108  |  23.9  ----------------------------<  86  3.9   |  28.0  |  0.58    Ca    8.7      26 Feb 2024 06:19  Mg     2.4     02-24    TPro  5.6  /  Alb  3.1  /  TBili  1.0  /  DBili  x   /  AST  18  /  ALT  16  /  AlkPhos  148  02-24          PT/INR - ( 26 Feb 2024 06:19 )   PT: 11.5 sec;   INR: 1.04 ratio                     TSH 8.75   TSH with FT4 reflex --  Total T3 --              POCT Blood Glucose.: 101 mg/dL (26 Feb 2024 08:39)  POCT Blood Glucose.: 102 mg/dL (26 Feb 2024 00:16)  POCT Blood Glucose.: 101 mg/dL (25 Feb 2024 18:39)  POCT Blood Glucose.: 144 mg/dL (25 Feb 2024 14:51)      Urinalysis Basic - ( 26 Feb 2024 06:19 )    Color: x / Appearance: x / SG: x / pH: x  Gluc: 86 mg/dL / Ketone: x  / Bili: x / Urobili: x   Blood: x / Protein: x / Nitrite: x   Leuk Esterase: x / RBC: x / WBC x   Sq Epi: x / Non Sq Epi: x / Bacteria: x        Culture - Blood (collected 23 Feb 2024 02:20)  Source: .Blood Blood-Peripheral  Preliminary Report (26 Feb 2024 07:01):    No growth at 72 Hours    Culture - Blood (collected 23 Feb 2024 02:15)  Source: .Blood Blood-Peripheral  Preliminary Report (26 Feb 2024 07:01):    No growth at 72 Hours        RADIOLOGY & ADDITIONAL TESTS:    Care Discussed with Consultants/Other Providers:

## 2024-02-27 ENCOUNTER — APPOINTMENT (OUTPATIENT)
Dept: ORTHOPEDIC SURGERY | Facility: CLINIC | Age: 79
End: 2024-02-27
Payer: MEDICARE

## 2024-02-27 LAB
ALBUMIN SERPL ELPH-MCNC: 3.4 G/DL — SIGNIFICANT CHANGE UP (ref 3.3–5.2)
ALP SERPL-CCNC: 174 U/L — HIGH (ref 40–120)
ALT FLD-CCNC: 20 U/L — SIGNIFICANT CHANGE UP
ANION GAP SERPL CALC-SCNC: 13 MMOL/L — SIGNIFICANT CHANGE UP (ref 5–17)
AST SERPL-CCNC: 22 U/L — SIGNIFICANT CHANGE UP
BILIRUB SERPL-MCNC: 1.1 MG/DL — SIGNIFICANT CHANGE UP (ref 0.4–2)
BLD GP AB SCN SERPL QL: SIGNIFICANT CHANGE UP
BUN SERPL-MCNC: 17.2 MG/DL — SIGNIFICANT CHANGE UP (ref 8–20)
CALCIUM SERPL-MCNC: 9 MG/DL — SIGNIFICANT CHANGE UP (ref 8.4–10.5)
CHLORIDE SERPL-SCNC: 105 MMOL/L — SIGNIFICANT CHANGE UP (ref 96–108)
CO2 SERPL-SCNC: 27 MMOL/L — SIGNIFICANT CHANGE UP (ref 22–29)
CREAT SERPL-MCNC: 0.59 MG/DL — SIGNIFICANT CHANGE UP (ref 0.5–1.3)
EGFR: 93 ML/MIN/1.73M2 — SIGNIFICANT CHANGE UP
GLUCOSE BLDC GLUCOMTR-MCNC: 103 MG/DL — HIGH (ref 70–99)
GLUCOSE BLDC GLUCOMTR-MCNC: 86 MG/DL — SIGNIFICANT CHANGE UP (ref 70–99)
GLUCOSE BLDC GLUCOMTR-MCNC: 86 MG/DL — SIGNIFICANT CHANGE UP (ref 70–99)
GLUCOSE BLDC GLUCOMTR-MCNC: 92 MG/DL — SIGNIFICANT CHANGE UP (ref 70–99)
GLUCOSE SERPL-MCNC: 79 MG/DL — SIGNIFICANT CHANGE UP (ref 70–99)
HCT VFR BLD CALC: 31.6 % — LOW (ref 34.5–45)
HGB BLD-MCNC: 9.6 G/DL — LOW (ref 11.5–15.5)
INR BLD: 1.04 RATIO — SIGNIFICANT CHANGE UP (ref 0.85–1.18)
MAGNESIUM SERPL-MCNC: 2.1 MG/DL — SIGNIFICANT CHANGE UP (ref 1.6–2.6)
MCHC RBC-ENTMCNC: 28.1 PG — SIGNIFICANT CHANGE UP (ref 27–34)
MCHC RBC-ENTMCNC: 30.4 GM/DL — LOW (ref 32–36)
MCV RBC AUTO: 92.4 FL — SIGNIFICANT CHANGE UP (ref 80–100)
PLATELET # BLD AUTO: 232 K/UL — SIGNIFICANT CHANGE UP (ref 150–400)
POTASSIUM SERPL-MCNC: 3.9 MMOL/L — SIGNIFICANT CHANGE UP (ref 3.5–5.3)
POTASSIUM SERPL-SCNC: 3.9 MMOL/L — SIGNIFICANT CHANGE UP (ref 3.5–5.3)
PROT SERPL-MCNC: 6.4 G/DL — LOW (ref 6.6–8.7)
PROTHROM AB SERPL-ACNC: 11.5 SEC — SIGNIFICANT CHANGE UP (ref 9.5–13)
RBC # BLD: 3.42 M/UL — LOW (ref 3.8–5.2)
RBC # FLD: 16.9 % — HIGH (ref 10.3–14.5)
SODIUM SERPL-SCNC: 145 MMOL/L — SIGNIFICANT CHANGE UP (ref 135–145)
WBC # BLD: 7.3 K/UL — SIGNIFICANT CHANGE UP (ref 3.8–10.5)
WBC # FLD AUTO: 7.3 K/UL — SIGNIFICANT CHANGE UP (ref 3.8–10.5)

## 2024-02-27 PROCEDURE — 99443: CPT | Mod: 93

## 2024-02-27 PROCEDURE — 99233 SBSQ HOSP IP/OBS HIGH 50: CPT

## 2024-02-27 PROCEDURE — 73030 X-RAY EXAM OF SHOULDER: CPT | Mod: 26,RT

## 2024-02-27 RX ORDER — POVIDONE-IODINE 5 %
1 AEROSOL (ML) TOPICAL ONCE
Refills: 0 | Status: DISCONTINUED | OUTPATIENT
Start: 2024-02-27 | End: 2024-02-29

## 2024-02-27 RX ORDER — SOD SULF/SODIUM/NAHCO3/KCL/PEG
2000 SOLUTION, RECONSTITUTED, ORAL ORAL ONCE
Refills: 0 | Status: COMPLETED | OUTPATIENT
Start: 2024-02-27 | End: 2024-02-27

## 2024-02-27 RX ORDER — CEFAZOLIN SODIUM 1 G
2000 VIAL (EA) INJECTION ONCE
Refills: 0 | Status: DISCONTINUED | OUTPATIENT
Start: 2024-02-28 | End: 2024-02-29

## 2024-02-27 RX ORDER — MUPIROCIN 20 MG/G
1 OINTMENT TOPICAL
Refills: 0 | Status: DISCONTINUED | OUTPATIENT
Start: 2024-02-27 | End: 2024-03-02

## 2024-02-27 RX ORDER — VANCOMYCIN HCL 1 G
1500 VIAL (EA) INTRAVENOUS ONCE
Refills: 0 | Status: DISCONTINUED | OUTPATIENT
Start: 2024-02-28 | End: 2024-02-29

## 2024-02-27 RX ORDER — CHLORHEXIDINE GLUCONATE 213 G/1000ML
1 SOLUTION TOPICAL EVERY 12 HOURS
Refills: 0 | Status: COMPLETED | OUTPATIENT
Start: 2024-02-27 | End: 2024-02-28

## 2024-02-27 RX ADMIN — PANTOPRAZOLE SODIUM 40 MILLIGRAM(S): 20 TABLET, DELAYED RELEASE ORAL at 18:50

## 2024-02-27 RX ADMIN — NYSTATIN CREAM 1 APPLICATION(S): 100000 CREAM TOPICAL at 18:59

## 2024-02-27 RX ADMIN — Medication 5 MILLIGRAM(S): at 05:58

## 2024-02-27 RX ADMIN — NYSTATIN CREAM 1 APPLICATION(S): 100000 CREAM TOPICAL at 06:07

## 2024-02-27 RX ADMIN — MUPIROCIN 1 APPLICATION(S): 20 OINTMENT TOPICAL at 18:52

## 2024-02-27 RX ADMIN — CHLORHEXIDINE GLUCONATE 1 APPLICATION(S): 213 SOLUTION TOPICAL at 22:11

## 2024-02-27 RX ADMIN — Medication 2000 MILLILITER(S): at 22:24

## 2024-02-27 RX ADMIN — ARIPIPRAZOLE 30 MILLIGRAM(S): 15 TABLET ORAL at 11:38

## 2024-02-27 RX ADMIN — Medication 0.5 MILLIGRAM(S): at 22:11

## 2024-02-27 RX ADMIN — PANTOPRAZOLE SODIUM 40 MILLIGRAM(S): 20 TABLET, DELAYED RELEASE ORAL at 05:59

## 2024-02-27 RX ADMIN — ATORVASTATIN CALCIUM 40 MILLIGRAM(S): 80 TABLET, FILM COATED ORAL at 22:11

## 2024-02-27 NOTE — CHART NOTE - NSCHARTNOTEFT_GEN_A_CORE
Due to staffing and scheduling issues in [facility], we can't perform colonoscopy today.   Please give 1 more Movieprep and keep on clears, NPO after midnight.   We will try to do her colonoscopy first case tomorrow am. Due to staffing and scheduling issues in [facility], we can't perform colonoscopy today.   Please give 1 more Moviprep and keep on clears, NPO after midnight.   We will try to do her colonoscopy first case tomorrow am.

## 2024-02-27 NOTE — PROGRESS NOTE ADULT - SUBJECTIVE AND OBJECTIVE BOX
INTERVAL HPI/OVERNIGHT EVENTS:    CC: anemia sec GI bleed, shoulder dislocation, hyperlipidemia      Chart and course reviewed  states she feels ok  waiting for colonoscopy      Vital Signs Last 24 Hrs  T(C): 36.6 (27 Feb 2024 08:36), Max: 36.6 (27 Feb 2024 04:38)  T(F): 97.8 (27 Feb 2024 08:36), Max: 97.9 (27 Feb 2024 04:38)  HR: 61 (27 Feb 2024 08:36) (58 - 69)  BP: 157/83 (27 Feb 2024 08:36) (137/78 - 157/83)  BP(mean): --  RR: 18 (27 Feb 2024 08:36) (18 - 18)  SpO2: 100% (27 Feb 2024 08:36) (97% - 100%)    Parameters below as of 27 Feb 2024 08:36  Patient On (Oxygen Delivery Method): nasal cannula  O2 Flow (L/min): 2      PHYSICAL EXAM:    GENERAL: alert, not in distress  CHEST/LUNG: b/l air entry  HEART: reg  ABDOMEN: soft, bs+, non tender  EXTREMITIES:  no edema, tenderness    MEDICATIONS  (STANDING):  ARIPiprazole 30 milliGRAM(s) Oral daily  atorvastatin 40 milliGRAM(s) Oral at bedtime  chlorhexidine 2% Cloths 1 Application(s) Topical every 12 hours  clonazePAM  Tablet 0.5 milliGRAM(s) Oral at bedtime  dextrose 5%. 1000 milliLiter(s) (50 mL/Hr) IV Continuous <Continuous>  dextrose 5%. 1000 milliLiter(s) (100 mL/Hr) IV Continuous <Continuous>  dextrose 50% Injectable 25 Gram(s) IV Push once  dextrose 50% Injectable 12.5 Gram(s) IV Push once  dextrose 50% Injectable 25 Gram(s) IV Push once  enalapril 5 milliGRAM(s) Oral daily  glucagon  Injectable 1 milliGRAM(s) IntraMuscular once  influenza  Vaccine (HIGH DOSE) 0.7 milliLiter(s) IntraMuscular once  insulin lispro (ADMELOG) corrective regimen sliding scale   SubCutaneous three times a day before meals  insulin lispro (ADMELOG) corrective regimen sliding scale   SubCutaneous at bedtime  mupirocin 2% Ointment 1 Application(s) Both Nostrils two times a day  nystatin Cream 1 Application(s) Topical two times a day  pantoprazole  Injectable 40 milliGRAM(s) IV Push every 12 hours  povidone iodine 5% Nasal Swab 1 Application(s) Both Nostrils once    MEDICATIONS  (PRN):  acetaminophen     Tablet .. 975 milliGRAM(s) Oral every 8 hours PRN Mild Pain (1 - 3), Moderate Pain (4 - 6)  albuterol    90 MICROgram(s) HFA Inhaler 2 Puff(s) Inhalation every 6 hours PRN Shortness of Breath and/or Wheezing  dextrose Oral Gel 15 Gram(s) Oral once PRN Blood Glucose LESS THAN 70 milliGRAM(s)/deciliter      Allergies    penicillins (Unknown)  doxycycline (Unknown)    Intolerances          LABS:                          9.6    7.30  )-----------( 232      ( 27 Feb 2024 06:23 )             31.6     02-27    145  |  105  |  17.2  ----------------------------<  79  3.9   |  27.0  |  0.59    Ca    9.0      27 Feb 2024 06:23  Mg     2.1     02-27    TPro  6.4<L>  /  Alb  3.4  /  TBili  1.1  /  DBili  x   /  AST  22  /  ALT  20  /  AlkPhos  174<H>  02-27    PT/INR - ( 27 Feb 2024 06:23 )   PT: 11.5 sec;   INR: 1.04 ratio           Urinalysis Basic - ( 27 Feb 2024 06:23 )    Color: x / Appearance: x / SG: x / pH: x  Gluc: 79 mg/dL / Ketone: x  / Bili: x / Urobili: x   Blood: x / Protein: x / Nitrite: x   Leuk Esterase: x / RBC: x / WBC x   Sq Epi: x / Non Sq Epi: x / Bacteria: x        RADIOLOGY & ADDITIONAL TESTS:

## 2024-02-27 NOTE — PROGRESS NOTE ADULT - ASSESSMENT
78 year old female with Hypertension, Dyslipidemia, Diabetes, COPD and Atrial Fibrillation on anticoagulation presents with Right arm pain and swelling and bruising. Daily ASA, Xarelto and now with Motrin for right shoulder pain  Anemia (Hgb 7.6), Elevated . Chest X-Ray with increased interstitial marking as well as inferior dislocation of right shoulder. EKG Sinus bradycardia. Received PRBC for anemia. GI consulted for EGD and colonoscopy. EGD revealed non erosive gastritis. Colonoscopy scheduled for 2/27. Ortho consulted for shoulder dislocation.     #Iron def. anemia/ Likely due to GIB in setting of chronic NSAID use.  -No signs of overt GI bleed.   - Monitor H/H  - Hb stable, s/p 1 unit of PRBC  - cont to hold Rivaroxaban and ASA   - Avoid NSAIDS  - C/w PPI 40mg IV q12  - EGD with non erosive gastritis, colonoscopy scheduled for today.    #Sinus bradycardia with h/o PAF   - Cardio consulted - recs noted  - Monitor on tele   - C/t hold CCB and Amio     #Right shoulder dislocation  - Tylenol q8 prn   - Ortho follow up noted  - plan for possible open vs closed reduction in am    #Dermatitis  -Likely candida, rash involving folds/creases.   - continue Nystatin cream    #Hypothermia (2/23) - RESOLVED.   #Subclinical hypothyroidism  - Blood cx - NGTD.  - TSH is high (10.68), free T4 is normal (1.3).   - Repeat TSH and Free T4 as outpatient    #NIDDM2  - A1c 5.0%  - Hold oral hypoglycemic agent  - Blood glucose monitoring with sliding scale Lispro    #Dyslipidemia  - Statin    #COPD  - PRN MING    VTE prophylaxis  SCDs    Discussed with patient and RN.

## 2024-02-27 NOTE — PROGRESS NOTE ADULT - SUBJECTIVE AND OBJECTIVE BOX
INTERVAL HISTORY:  NO chest pain  Breathing is improved  	  MEDICATIONS:  enalapril 5 milliGRAM(s) Oral daily      albuterol    90 MICROgram(s) HFA Inhaler 2 Puff(s) Inhalation every 6 hours PRN    acetaminophen     Tablet .. 975 milliGRAM(s) Oral every 8 hours PRN  ARIPiprazole 30 milliGRAM(s) Oral daily  clonazePAM  Tablet 0.5 milliGRAM(s) Oral at bedtime    pantoprazole  Injectable 40 milliGRAM(s) IV Push every 12 hours    atorvastatin 40 milliGRAM(s) Oral at bedtime  dextrose 50% Injectable 25 Gram(s) IV Push once  dextrose 50% Injectable 12.5 Gram(s) IV Push once  dextrose 50% Injectable 25 Gram(s) IV Push once  dextrose Oral Gel 15 Gram(s) Oral once PRN  glucagon  Injectable 1 milliGRAM(s) IntraMuscular once  insulin lispro (ADMELOG) corrective regimen sliding scale   SubCutaneous three times a day before meals  insulin lispro (ADMELOG) corrective regimen sliding scale   SubCutaneous at bedtime    chlorhexidine 2% Cloths 1 Application(s) Topical every 12 hours  dextrose 5%. 1000 milliLiter(s) IV Continuous <Continuous>  dextrose 5%. 1000 milliLiter(s) IV Continuous <Continuous>  influenza  Vaccine (HIGH DOSE) 0.7 milliLiter(s) IntraMuscular once  mupirocin 2% Ointment 1 Application(s) Both Nostrils two times a day  nystatin Cream 1 Application(s) Topical two times a day  povidone iodine 5% Nasal Swab 1 Application(s) Both Nostrils once        PHYSICAL EXAM:    T(C): 36.9 (02-27-24 @ 19:58), Max: 36.9 (02-27-24 @ 19:58)  HR: 70 (02-27-24 @ 19:58) (59 - 70)  BP: 146/81 (02-27-24 @ 19:58) (137/78 - 157/83)  RR: 18 (02-27-24 @ 19:58) (18 - 18)  SpO2: 96% (02-27-24 @ 19:58) (96% - 100%)  Wt(kg): --    I&O's Summary    27 Feb 2024 07:01  -  27 Feb 2024 22:59  --------------------------------------------------------  IN: 480 mL / OUT: 300 mL / NET: 180 mL        Daily     Daily     Appearance: Normal	  HEENT:   Normal oral mucosa, PERRL, EOMI	  Cardiovascular: Normal S1 S2, No JVD, No murmurs, No edema  Respiratory: Lungs clear to auscultation	  Psychiatry: A & O x 3, Mood & affect appropriate  Gastrointestinal:  Soft, Non-tender, + BS	  Skin: No rashes, No ecchymoses, No cyanosis  Neurologic: Non-focal  Extremities: No edema                          9.6    7.30  )-----------( 232      ( 27 Feb 2024 06:23 )             31.6     02-27    145  |  105  |  17.2  ----------------------------<  79  3.9   |  27.0  |  0.59    Ca    9.0      27 Feb 2024 06:23  Mg     2.1     02-27    TPro  6.4<L>  /  Alb  3.4  /  TBili  1.1  /  DBili  x   /  AST  22  /  ALT  20  /  AlkPhos  174<H>  02-27    proBNP:   Lipid Profile:   HgA1c:     ASSESSMENT/PLAN: 	  78 year old female former smoker with  PMH of Hypertension, HLD, Diabetes, COPD and Atrial Fibrillation (S/p A-fib ablation with Dr Crowley 6/2023) with continued episodes of paroxysmal A-Fib and remains on Xarelto, who presented to ER with complaints of Right arm pain and increased swelling and bruising. As per patient she hurt her arm approximately 6 weeks ago and had imaging which dtr reported was an Xray and MRI and both studies were reported to be normal. Pt also reports recent few episodes of dark tarry stool in the past few weeks.   Office Echo 2/28/2023 shows Atrial Fibrillation, Normal LVEF 55-60%, Mild-Moderate MR, Mild TR  C 3/2023  shows Normal LVEF 55%, Mild to moderate stenosis of the MID LAD and OM3, No AS, Very Mild MR    No cardiac contraindication to orthopedic procedure/surgery

## 2024-02-27 NOTE — DIETITIAN INITIAL EVALUATION ADULT - NS FNS DIET ORDER
Diet, NPO after Midnight:      NPO Start Date: 27-Feb-2024,   NPO Start Time: 23:59 (02-27-24 @ 12:47)  Diet, NPO after Midnight:      NPO Start Date: 26-Feb-2024,   NPO Start Time: 23:59  Except Medications (02-26-24 @ 12:00)  Diet, DASH/TLC:   Sodium & Cholesterol Restricted  Consistent Carbohydrate {Evening Snack} (CSTCHOSN)  1200mL Fluid Restriction ([id number]) (02-22-24 @ 12:01)

## 2024-02-27 NOTE — PROGRESS NOTE ADULT - SUBJECTIVE AND OBJECTIVE BOX
Pt Name: NOE SANDHU    MRN: [id number]      Patient is a being followed for right shoulder dislocation. Patient is a poor hisotrian, unsure of when shoulder pain began. denies recent trauma or fall. Patient complaining of right shoulder pain and limited ROM. previous XR demonstrate dislocated shoulder. Patient denies acute motor or sensory changes.       PAST MEDICAL & SURGICAL HISTORY:  PAST MEDICAL & SURGICAL HISTORY:  Permanent atrial fibrillation      Hypertension      Hyperlipemia      Diabetes      COPD (chronic obstructive pulmonary disease)      Former smoker      H/O: hysterectomy      H/O lumpectomy          Allergies: penicillins (Unknown)  doxycycline (Unknown)      Medications: acetaminophen     Tablet .. 975 milliGRAM(s) Oral every 8 hours PRN  albuterol    90 MICROgram(s) HFA Inhaler 2 Puff(s) Inhalation every 6 hours PRN  ARIPiprazole 30 milliGRAM(s) Oral daily  atorvastatin 40 milliGRAM(s) Oral at bedtime  clonazePAM  Tablet 0.5 milliGRAM(s) Oral at bedtime  dextrose 5%. 1000 milliLiter(s) IV Continuous <Continuous>  dextrose 5%. 1000 milliLiter(s) IV Continuous <Continuous>  dextrose 50% Injectable 25 Gram(s) IV Push once  dextrose 50% Injectable 12.5 Gram(s) IV Push once  dextrose 50% Injectable 25 Gram(s) IV Push once  dextrose Oral Gel 15 Gram(s) Oral once PRN  enalapril 5 milliGRAM(s) Oral daily  glucagon  Injectable 1 milliGRAM(s) IntraMuscular once  influenza  Vaccine (HIGH DOSE) 0.7 milliLiter(s) IntraMuscular once  insulin lispro (ADMELOG) corrective regimen sliding scale   SubCutaneous three times a day before meals  insulin lispro (ADMELOG) corrective regimen sliding scale   SubCutaneous at bedtime  nystatin Cream 1 Application(s) Topical two times a day  pantoprazole  Injectable 40 milliGRAM(s) IV Push every 12 hours        Ambulation: Walking independently [ ] With Cane [x] With Walker [ ]  Bedbound [ ]                           9.6    7.30  )-----------( 232      ( 27 Feb 2024 06:23 )             31.6     02-27    145  |  105  |  17.2  ----------------------------<  79  3.9   |  27.0  |  0.59    Ca    9.0      27 Feb 2024 06:23  Mg     2.1     02-27    TPro  6.4<L>  /  Alb  3.4  /  TBili  1.1  /  DBili  x   /  AST  22  /  ALT  20  /  AlkPhos  174<H>  02-27      PHYSICAL EXAM:    Vital Signs Last 24 Hrs  T(C): 36.6 (27 Feb 2024 08:36), Max: 36.6 (27 Feb 2024 04:38)  T(F): 97.8 (27 Feb 2024 08:36), Max: 97.9 (27 Feb 2024 04:38)  HR: 61 (27 Feb 2024 08:36) (58 - 72)  BP: 157/83 (27 Feb 2024 08:36) (137/78 - 157/83)  BP(mean): --  RR: 18 (27 Feb 2024 08:36) (18 - 19)  SpO2: 100% (27 Feb 2024 08:36) (97% - 100%)    Parameters below as of 27 Feb 2024 08:36  Patient On (Oxygen Delivery Method): nasal cannula  O2 Flow (L/min): 2    Daily     Daily     Appearance: Alert, responsive, in no acute distress.    Musculoskeletal:         Left Upper Extremity: Patient able to push self up out of bed with right arm. + ecchymosis noted to posterior shoulder extending to elbow. limited ROM of shoulder due to pain. elbow/wrist/hand/digits ROM intact 5/5. radial pulse 2+. SILT. compartments soft and compressible       A/P:  Pt is a  78y Female with right shoulder dislocation    PLAN:   * case d/w DR. [place]  * plan for OR tomorrow 2/28/24-open vs closed reduction  * medical team aware for optimization   * repeat XR right shoulder ordered

## 2024-02-27 NOTE — DIETITIAN INITIAL EVALUATION ADULT - OTHER INFO
78 year old female with Hypertension, Dyslipidemia, Diabetes, COPD and Atrial Fibrillation on anticoagulation presents with Right arm pain and swelling and bruising. Daily ASA, Xarelto and now with Motrin for right shoulder pain. Anemia (Hgb 7.6), Elevated . Chest X-Ray with increased interstitial marking as well as inferior dislocation of right shoulder. EKG Sinus bradycardia. Plan for OR tomorrow for open vs closed reduction.

## 2024-02-27 NOTE — DIETITIAN INITIAL EVALUATION ADULT - PERTINENT LABORATORY DATA
02-27    145  |  105  |  17.2  ----------------------------<  79  3.9   |  27.0  |  0.59    Ca    9.0      27 Feb 2024 06:23  Mg     2.1     02-27    TPro  6.4<L>  /  Alb  3.4  /  TBili  1.1  /  DBili  x   /  AST  22  /  ALT  20  /  AlkPhos  174<H>  02-27  POCT Blood Glucose.: 103 mg/dL (02-27-24 @ 11:35)  A1C with Estimated Average Glucose Result: 5.0 % (02-23-24 @ 02:15)

## 2024-02-27 NOTE — DIETITIAN INITIAL EVALUATION ADULT - PERTINENT MEDS FT
MEDICATIONS  (STANDING):  ARIPiprazole 30 milliGRAM(s) Oral daily  atorvastatin 40 milliGRAM(s) Oral at bedtime  chlorhexidine 2% Cloths 1 Application(s) Topical every 12 hours  clonazePAM  Tablet 0.5 milliGRAM(s) Oral at bedtime  dextrose 5%. 1000 milliLiter(s) (50 mL/Hr) IV Continuous <Continuous>  dextrose 5%. 1000 milliLiter(s) (100 mL/Hr) IV Continuous <Continuous>  insulin lispro (ADMELOG) corrective regimen sliding scale   SubCutaneous three times a day before meals  insulin lispro (ADMELOG) corrective regimen sliding scale   SubCutaneous at bedtime  pantoprazole  Injectable 40 milliGRAM(s) IV Push every 12 hours

## 2024-02-27 NOTE — DIETITIAN INITIAL EVALUATION ADULT - ORAL INTAKE PTA/DIET HISTORY
Nutrition assessment completed. Pt currently NPO after midnight for coloscopy today. Pt reports good appetite/po intake prior to admission and prior to NPO status. States she tries to eat lower sodium foods at home but does have times when she eats high sodium foods (potato chips, pretzels). Pt reports she gained 50 lbs x 5 years (not significant) ago due to her  passing away. Provided with meal planning with plate method and heart failure nutrition therapy. RD to follow up as feasible.

## 2024-02-28 ENCOUNTER — APPOINTMENT (OUTPATIENT)
Dept: ORTHOPEDIC SURGERY | Facility: CLINIC | Age: 79
End: 2024-02-28
Payer: MEDICARE

## 2024-02-28 ENCOUNTER — TRANSCRIPTION ENCOUNTER (OUTPATIENT)
Age: 79
End: 2024-02-28

## 2024-02-28 DIAGNOSIS — M25.511 PAIN IN RIGHT SHOULDER: ICD-10-CM

## 2024-02-28 LAB
ACANTHOCYTES BLD QL SMEAR: SIGNIFICANT CHANGE UP
ANION GAP SERPL CALC-SCNC: 9 MMOL/L — SIGNIFICANT CHANGE UP (ref 5–17)
ANISOCYTOSIS BLD QL: SLIGHT — SIGNIFICANT CHANGE UP
BASOPHILS # BLD AUTO: 0.02 K/UL — SIGNIFICANT CHANGE UP (ref 0–0.2)
BASOPHILS NFR BLD AUTO: 0.4 % — SIGNIFICANT CHANGE UP (ref 0–2)
BUN SERPL-MCNC: 11.8 MG/DL — SIGNIFICANT CHANGE UP (ref 8–20)
CALCIUM SERPL-MCNC: 8.8 MG/DL — SIGNIFICANT CHANGE UP (ref 8.4–10.5)
CHLORIDE SERPL-SCNC: 106 MMOL/L — SIGNIFICANT CHANGE UP (ref 96–108)
CO2 SERPL-SCNC: 29 MMOL/L — SIGNIFICANT CHANGE UP (ref 22–29)
CREAT SERPL-MCNC: 0.53 MG/DL — SIGNIFICANT CHANGE UP (ref 0.5–1.3)
CULTURE RESULTS: SIGNIFICANT CHANGE UP
CULTURE RESULTS: SIGNIFICANT CHANGE UP
EGFR: 96 ML/MIN/1.73M2 — SIGNIFICANT CHANGE UP
ELLIPTOCYTES BLD QL SMEAR: SLIGHT — SIGNIFICANT CHANGE UP
EOSINOPHIL # BLD AUTO: 0.12 K/UL — SIGNIFICANT CHANGE UP (ref 0–0.5)
EOSINOPHIL NFR BLD AUTO: 2.6 % — SIGNIFICANT CHANGE UP (ref 0–6)
GLUCOSE BLDC GLUCOMTR-MCNC: 100 MG/DL — HIGH (ref 70–99)
GLUCOSE BLDC GLUCOMTR-MCNC: 102 MG/DL — HIGH (ref 70–99)
GLUCOSE BLDC GLUCOMTR-MCNC: 104 MG/DL — HIGH (ref 70–99)
GLUCOSE BLDC GLUCOMTR-MCNC: 81 MG/DL — SIGNIFICANT CHANGE UP (ref 70–99)
GLUCOSE BLDC GLUCOMTR-MCNC: 86 MG/DL — SIGNIFICANT CHANGE UP (ref 70–99)
GLUCOSE SERPL-MCNC: 72 MG/DL — SIGNIFICANT CHANGE UP (ref 70–99)
HCT VFR BLD CALC: 28.4 % — LOW (ref 34.5–45)
HGB BLD-MCNC: 9.1 G/DL — LOW (ref 11.5–15.5)
IMM GRANULOCYTES NFR BLD AUTO: 0.2 % — SIGNIFICANT CHANGE UP (ref 0–0.9)
LYMPHOCYTES # BLD AUTO: 0.38 K/UL — LOW (ref 1–3.3)
LYMPHOCYTES # BLD AUTO: 8.2 % — LOW (ref 13–44)
MAGNESIUM SERPL-MCNC: 1.9 MG/DL — SIGNIFICANT CHANGE UP (ref 1.6–2.6)
MANUAL SMEAR VERIFICATION: SIGNIFICANT CHANGE UP
MCHC RBC-ENTMCNC: 29.4 PG — SIGNIFICANT CHANGE UP (ref 27–34)
MCHC RBC-ENTMCNC: 32 GM/DL — SIGNIFICANT CHANGE UP (ref 32–36)
MCV RBC AUTO: 91.6 FL — SIGNIFICANT CHANGE UP (ref 80–100)
MONOCYTES # BLD AUTO: 0.51 K/UL — SIGNIFICANT CHANGE UP (ref 0–0.9)
MONOCYTES NFR BLD AUTO: 11 % — SIGNIFICANT CHANGE UP (ref 2–14)
NEUTROPHILS # BLD AUTO: 3.58 K/UL — SIGNIFICANT CHANGE UP (ref 1.8–7.4)
NEUTROPHILS NFR BLD AUTO: 77.6 % — HIGH (ref 43–77)
OVALOCYTES BLD QL SMEAR: SLIGHT — SIGNIFICANT CHANGE UP
PHOSPHATE SERPL-MCNC: 3.5 MG/DL — SIGNIFICANT CHANGE UP (ref 2.4–4.7)
PLAT MORPH BLD: NORMAL — SIGNIFICANT CHANGE UP
PLATELET # BLD AUTO: 208 K/UL — SIGNIFICANT CHANGE UP (ref 150–400)
POIKILOCYTOSIS BLD QL AUTO: SIGNIFICANT CHANGE UP
POLYCHROMASIA BLD QL SMEAR: SLIGHT — SIGNIFICANT CHANGE UP
POTASSIUM SERPL-MCNC: 3.7 MMOL/L — SIGNIFICANT CHANGE UP (ref 3.5–5.3)
POTASSIUM SERPL-SCNC: 3.7 MMOL/L — SIGNIFICANT CHANGE UP (ref 3.5–5.3)
RBC # BLD: 3.1 M/UL — LOW (ref 3.8–5.2)
RBC # FLD: 16.9 % — HIGH (ref 10.3–14.5)
RBC BLD AUTO: ABNORMAL
SODIUM SERPL-SCNC: 144 MMOL/L — SIGNIFICANT CHANGE UP (ref 135–145)
SPECIMEN SOURCE: SIGNIFICANT CHANGE UP
SPECIMEN SOURCE: SIGNIFICANT CHANGE UP
WBC # BLD: 4.62 K/UL — SIGNIFICANT CHANGE UP (ref 3.8–10.5)
WBC # FLD AUTO: 4.62 K/UL — SIGNIFICANT CHANGE UP (ref 3.8–10.5)

## 2024-02-28 PROCEDURE — 99233 SBSQ HOSP IP/OBS HIGH 50: CPT

## 2024-02-28 PROCEDURE — 45378 DIAGNOSTIC COLONOSCOPY: CPT

## 2024-02-28 PROCEDURE — 99443: CPT | Mod: 93

## 2024-02-28 PROCEDURE — 73200 CT UPPER EXTREMITY W/O DYE: CPT | Mod: 26,RT

## 2024-02-28 DEVICE — NAIL OSTEO 1.5X16MM STRL: Type: IMPLANTABLE DEVICE | Status: FUNCTIONAL

## 2024-02-28 RX ORDER — IRON SUCROSE 20 MG/ML
100 INJECTION, SOLUTION INTRAVENOUS EVERY 24 HOURS
Refills: 0 | Status: COMPLETED | OUTPATIENT
Start: 2024-02-28 | End: 2024-03-01

## 2024-02-28 RX ADMIN — ATORVASTATIN CALCIUM 40 MILLIGRAM(S): 80 TABLET, FILM COATED ORAL at 21:53

## 2024-02-28 RX ADMIN — Medication 0.5 MILLIGRAM(S): at 21:53

## 2024-02-28 RX ADMIN — Medication 5 MILLIGRAM(S): at 12:20

## 2024-02-28 RX ADMIN — Medication 975 MILLIGRAM(S): at 17:34

## 2024-02-28 RX ADMIN — PANTOPRAZOLE SODIUM 40 MILLIGRAM(S): 20 TABLET, DELAYED RELEASE ORAL at 05:43

## 2024-02-28 RX ADMIN — MUPIROCIN 1 APPLICATION(S): 20 OINTMENT TOPICAL at 05:44

## 2024-02-28 RX ADMIN — IRON SUCROSE 210 MILLIGRAM(S): 20 INJECTION, SOLUTION INTRAVENOUS at 17:35

## 2024-02-28 RX ADMIN — NYSTATIN CREAM 1 APPLICATION(S): 100000 CREAM TOPICAL at 05:44

## 2024-02-28 RX ADMIN — NYSTATIN CREAM 1 APPLICATION(S): 100000 CREAM TOPICAL at 17:34

## 2024-02-28 RX ADMIN — CHLORHEXIDINE GLUCONATE 1 APPLICATION(S): 213 SOLUTION TOPICAL at 05:43

## 2024-02-28 RX ADMIN — MUPIROCIN 1 APPLICATION(S): 20 OINTMENT TOPICAL at 17:36

## 2024-02-28 RX ADMIN — PANTOPRAZOLE SODIUM 40 MILLIGRAM(S): 20 TABLET, DELAYED RELEASE ORAL at 17:34

## 2024-02-28 RX ADMIN — ARIPIPRAZOLE 30 MILLIGRAM(S): 15 TABLET ORAL at 12:21

## 2024-02-28 NOTE — PROGRESS NOTE ADULT - ASSESSMENT
78 year old female with Hypertension, Dyslipidemia, Diabetes, COPD and Atrial Fibrillation on anticoagulation presents with Right arm pain and swelling and bruising. Daily ASA, Xarelto and now with Motrin for right shoulder pain  Anemia (Hgb 7.6), Elevated . Chest X-Ray with increased interstitial marking as well as inferior dislocation of right shoulder. EKG Sinus bradycardia. Received PRBC for anemia. GI consulted for EGD and colonoscopy. EGD revealed non erosive gastritis. Colonoscopy scheduled for 2/27. Ortho consulted for shoulder dislocation.     #Iron def. anemia/ Likely due to GIB in setting of chronic NSAID use.  -No signs of overt GI bleed.   - Monitor H/H  - Hb stable, s/p 1 unit of PRBC  - cont to hold Rivaroxaban and ASA   - Avoid NSAIDS  - C/w PPI 40mg IV q12  - EGD with non erosive gastritis, colonoscopy done, with diverticulosis. CTE rec by GI  - low iron stores. will order Venofer.    #Sinus bradycardia with h/o PAF   - Cardio consulted - recs noted  - Monitor on tele   - C/t hold CCB and Amio     #Right shoulder dislocation  - Tylenol q8 prn   - Ortho follow up noted  - plan for possible open vs closed reduction   - no medical contraindications for procedure.    #Dermatitis  -Likely candida, rash involving folds/creases.   - continue Nystatin cream    #Hypothermia (2/23) - RESOLVED.   #Subclinical hypothyroidism  - Blood cx - NGTD.  - TSH is high (10.68), free T4 is normal (1.3).   - Repeat TSH and Free T4 as outpatient    #NIDDM2  - A1c 5.0%  - Hold oral hypoglycemic agent  - Blood glucose monitoring with sliding scale Lispro    #Dyslipidemia  - Statin    #COPD  - PRN MING    VTE prophylaxis  SCDs    Discussed with patient and RN.   updated daughter at bedside.

## 2024-02-28 NOTE — PROGRESS NOTE ADULT - SUBJECTIVE AND OBJECTIVE BOX
INTERVAL HISTORY:  Feeling well  No chest pain or shortness of breath  	  MEDICATIONS:  enalapril 5 milliGRAM(s) Oral daily    ceFAZolin  Injectable. 2000 milliGRAM(s) IV Push once  vancomycin  IVPB 1500 milliGRAM(s) IV Intermittent once    albuterol    90 MICROgram(s) HFA Inhaler 2 Puff(s) Inhalation every 6 hours PRN    acetaminophen     Tablet .. 975 milliGRAM(s) Oral every 8 hours PRN  ARIPiprazole 30 milliGRAM(s) Oral daily  clonazePAM  Tablet 0.5 milliGRAM(s) Oral at bedtime    pantoprazole  Injectable 40 milliGRAM(s) IV Push every 12 hours    atorvastatin 40 milliGRAM(s) Oral at bedtime  dextrose 50% Injectable 25 Gram(s) IV Push once  dextrose 50% Injectable 12.5 Gram(s) IV Push once  dextrose 50% Injectable 25 Gram(s) IV Push once  dextrose Oral Gel 15 Gram(s) Oral once PRN  glucagon  Injectable 1 milliGRAM(s) IntraMuscular once  insulin lispro (ADMELOG) corrective regimen sliding scale   SubCutaneous three times a day before meals  insulin lispro (ADMELOG) corrective regimen sliding scale   SubCutaneous at bedtime    dextrose 5%. 1000 milliLiter(s) IV Continuous <Continuous>  dextrose 5%. 1000 milliLiter(s) IV Continuous <Continuous>  influenza  Vaccine (HIGH DOSE) 0.7 milliLiter(s) IntraMuscular once  iron sucrose IVPB 100 milliGRAM(s) IV Intermittent every 24 hours  mupirocin 2% Ointment 1 Application(s) Both Nostrils two times a day  nystatin Cream 1 Application(s) Topical two times a day  povidone iodine 5% Nasal Swab 1 Application(s) Both Nostrils once        PHYSICAL EXAM:    T(C): 36.1 (24 @ 21:59), Max: 36.7 (24 @ 04:21)  HR: 59 (24 @ 21:59) (50 - 59)  BP: 137/80 (24 @ 21:59) (101/63 - 152/70)  RR: 18 (24 @ 21:59) (18 - 18)  SpO2: 99% (24 @ 21:59) (98% - 100%)  Wt(kg): --    I&O's Summary    2024 07:01  -  2024 07:00  --------------------------------------------------------  IN: 480 mL / OUT: 300 mL / NET: 180 mL        Daily     Daily Weight in k.2 (2024 04:21)    Appearance: Normal	  HEENT:   Normal oral mucosa, PERRL, EOMI	  Cardiovascular: Normal S1 S2, No JVD, No murmurs, No edema  Respiratory: Lungs clear to auscultation	  Psychiatry: A & O x 3, Mood & affect appropriate  Gastrointestinal:  Soft, Non-tender, + BS	  Skin: No rashes, No ecchymoses, No cyanosis  Neurologic: Non-focal  Extremities: No edema                          9.1    4.62  )-----------( 208      ( 2024 07:35 )             28.4         144  |  106  |  11.8  ----------------------------<  72  3.7   |  29.0  |  0.53    Ca    8.8      2024 07:35  Phos  3.5       Mg     1.9         TPro  6.4<L>  /  Alb  3.4  /  TBili  1.1  /  DBili  x   /  AST  22  /  ALT  20  /  AlkPhos  174<H>      proBNP:   Lipid Profile:   HgA1c:     ASSESSMENT/PLAN: 	  78 year old female former smoker with  PMH of Hypertension, HLD, Diabetes, COPD and Atrial Fibrillation (S/p A-fib ablation with Dr Crowley 2023) with continued episodes of paroxysmal A-Fib and remains on Xarelto, who presented to ER with complaints of Right arm pain and increased swelling and bruising. As per patient she hurt her arm approximately 6 weeks ago and had imaging which dtr reported was an Xray and MRI and both studies were reported to be normal. Pt also reports recent few episodes of dark tarry stool in the past few weeks.   Office Echo 2023 shows Atrial Fibrillation, Normal LVEF 55-60%, Mild-Moderate MR, Mild TR  [facility] 3/2023  shows Normal LVEF 55%, Mild to moderate stenosis of the MID LAD and OM3, No AS, Very Mild MR    No cardiac contraindication to orthopedic procedure/surgery

## 2024-02-28 NOTE — CHART NOTE - NSCHARTNOTEFT_GEN_A_CORE
I spoke to daughter Nu .   EGD- mild gastritis   colon showed diverticulosis, hemorrhoids  Solid diet now. NPO after midnight  CT Enterography  in am

## 2024-02-28 NOTE — PROGRESS NOTE ADULT - SUBJECTIVE AND OBJECTIVE BOX
INTERVAL HPI/OVERNIGHT EVENTS:     CC: anemia sec GI bleed, shoulder dislocation, hyperlipidemia    No overnight events  per ortho confused this am  evaluated at bedside, noted to be AOx 3  daughter at bedside.     Vital Signs Last 24 Hrs  T(C): 36.7 (28 Feb 2024 04:21), Max: 36.9 (27 Feb 2024 19:58)  T(F): 98.1 (28 Feb 2024 04:21), Max: 98.4 (27 Feb 2024 19:58)  HR: 58 (28 Feb 2024 04:21) (58 - 70)  BP: 101/63 (28 Feb 2024 04:21) (101/63 - 148/83)  BP(mean): --  RR: 18 (28 Feb 2024 04:21) (18 - 18)  SpO2: 98% (28 Feb 2024 04:21) (96% - 100%)    Parameters below as of 28 Feb 2024 04:21  Patient On (Oxygen Delivery Method): nasal cannula        PHYSICAL EXAM:    GENERAL: alert, not in distress, oriented x 3  CHEST/LUNG: b/l air entry  HEART: reg  ABDOMEN: soft, bs+  EXTREMITIES:  no edema, tenderness    MEDICATIONS  (STANDING):  ARIPiprazole 30 milliGRAM(s) Oral daily  atorvastatin 40 milliGRAM(s) Oral at bedtime  ceFAZolin  Injectable. 2000 milliGRAM(s) IV Push once  clonazePAM  Tablet 0.5 milliGRAM(s) Oral at bedtime  dextrose 5%. 1000 milliLiter(s) (50 mL/Hr) IV Continuous <Continuous>  dextrose 5%. 1000 milliLiter(s) (100 mL/Hr) IV Continuous <Continuous>  dextrose 50% Injectable 25 Gram(s) IV Push once  dextrose 50% Injectable 12.5 Gram(s) IV Push once  dextrose 50% Injectable 25 Gram(s) IV Push once  enalapril 5 milliGRAM(s) Oral daily  glucagon  Injectable 1 milliGRAM(s) IntraMuscular once  influenza  Vaccine (HIGH DOSE) 0.7 milliLiter(s) IntraMuscular once  insulin lispro (ADMELOG) corrective regimen sliding scale   SubCutaneous three times a day before meals  insulin lispro (ADMELOG) corrective regimen sliding scale   SubCutaneous at bedtime  mupirocin 2% Ointment 1 Application(s) Both Nostrils two times a day  nystatin Cream 1 Application(s) Topical two times a day  pantoprazole  Injectable 40 milliGRAM(s) IV Push every 12 hours  povidone iodine 5% Nasal Swab 1 Application(s) Both Nostrils once  vancomycin  IVPB 1500 milliGRAM(s) IV Intermittent once    MEDICATIONS  (PRN):  acetaminophen     Tablet .. 975 milliGRAM(s) Oral every 8 hours PRN Mild Pain (1 - 3), Moderate Pain (4 - 6)  albuterol    90 MICROgram(s) HFA Inhaler 2 Puff(s) Inhalation every 6 hours PRN Shortness of Breath and/or Wheezing  dextrose Oral Gel 15 Gram(s) Oral once PRN Blood Glucose LESS THAN 70 milliGRAM(s)/deciliter      Allergies    penicillins (Unknown)  doxycycline (Unknown)    Intolerances          LABS:                          9.1    4.62  )-----------( 208      ( 28 Feb 2024 07:35 )             28.4     02-28    144  |  106  |  11.8  ----------------------------<  72  3.7   |  29.0  |  0.53    Ca    8.8      28 Feb 2024 07:35  Phos  3.5     02-28  Mg     1.9     02-28    TPro  6.4<L>  /  Alb  3.4  /  TBili  1.1  /  DBili  x   /  AST  22  /  ALT  20  /  AlkPhos  174<H>  02-27    PT/INR - ( 27 Feb 2024 06:23 )   PT: 11.5 sec;   INR: 1.04 ratio           Urinalysis Basic - ( 28 Feb 2024 07:35 )    Color: x / Appearance: x / SG: x / pH: x  Gluc: 72 mg/dL / Ketone: x  / Bili: x / Urobili: x   Blood: x / Protein: x / Nitrite: x   Leuk Esterase: x / RBC: x / WBC x   Sq Epi: x / Non Sq Epi: x / Bacteria: x        RADIOLOGY & ADDITIONAL TESTS:

## 2024-02-28 NOTE — PROGRESS NOTE ADULT - SUBJECTIVE AND OBJECTIVE BOX
Pt Name: NOE SANDHU  MRN: [id number]    Patient is a being followed for right dislocated shoulder of unknown chronicity. Patient scheduled for OR tomorrow with Dr. Toledo for closed vs open reduction of right shoulder. Patient admits to pain and inability to move shoulder. Denies numbness or tingling to the RUE. Patient knows who she is and her birthday but unsure of where she is and what month/year we are in. Patient admits to feeling confused.     PAST MEDICAL & SURGICAL HISTORY:  Permanent atrial fibrillation  Hypertension  Hyperlipemia  Diabetes  COPD (chronic obstructive pulmonary disease)  Former smoker  H/O: hysterectomy  H/O lumpectomy    Allergies: penicillins (Unknown)  doxycycline (Unknown)    Medications: acetaminophen     Tablet .. 975 milliGRAM(s) Oral every 8 hours PRN  albuterol    90 MICROgram(s) HFA Inhaler 2 Puff(s) Inhalation every 6 hours PRN  ARIPiprazole 30 milliGRAM(s) Oral daily  atorvastatin 40 milliGRAM(s) Oral at bedtime  ceFAZolin  Injectable. 2000 milliGRAM(s) IV Push once  clonazePAM  Tablet 0.5 milliGRAM(s) Oral at bedtime  dextrose 5%. 1000 milliLiter(s) IV Continuous <Continuous>  dextrose 5%. 1000 milliLiter(s) IV Continuous <Continuous>  dextrose 50% Injectable 25 Gram(s) IV Push once  dextrose 50% Injectable 12.5 Gram(s) IV Push once  dextrose 50% Injectable 25 Gram(s) IV Push once  dextrose Oral Gel 15 Gram(s) Oral once PRN  enalapril 5 milliGRAM(s) Oral daily  glucagon  Injectable 1 milliGRAM(s) IntraMuscular once  influenza  Vaccine (HIGH DOSE) 0.7 milliLiter(s) IntraMuscular once  insulin lispro (ADMELOG) corrective regimen sliding scale   SubCutaneous three times a day before meals  insulin lispro (ADMELOG) corrective regimen sliding scale   SubCutaneous at bedtime  mupirocin 2% Ointment 1 Application(s) Both Nostrils two times a day  nystatin Cream 1 Application(s) Topical two times a day  pantoprazole  Injectable 40 milliGRAM(s) IV Push every 12 hours  povidone iodine 5% Nasal Swab 1 Application(s) Both Nostrils once  vancomycin  IVPB 1500 milliGRAM(s) IV Intermittent once                        9.6    7.30  )-----------( 232      ( 2024 06:23 )             31.6         145  |  105  |  17.2  ----------------------------<  79  3.9   |  27.0  |  0.59    Ca    9.0      2024 06:23  Mg     2.1         TPro  6.4<L>  /  Alb  3.4  /  TBili  1.1  /  DBili  x   /  AST  22  /  ALT  20  /  AlkPhos  174<H>      PHYSICAL EXAM:  Vital Signs Last 24 Hrs  T(C): 36.7 (2024 04:21), Max: 36.9 (2024 19:58)  T(F): 98.1 (2024 04:21), Max: 98.4 (2024 19:58)  HR: 58 (2024 04:21) (58 - 70)  BP: 101/63 (2024 04:21) (101/63 - 157/83)  BP(mean): --  RR: 18 (2024 04:21) (18 - 18)  SpO2: 98% (2024 04:21) (96% - 100%)  Parameters below as of 2024 04:21  Patient On (Oxygen Delivery Method): nasal cannula  Daily Weight in k.2 (2024 04:21)    Appearance: responsive, in no acute distress.  Neurological: Sensation is grossly intact to light touch to the RUE   Vascular: Radial pulse intact RUE. Cap refill < 2 sec. No cyanosis.  Musculoskeletal:  Right Upper Extremity - TTP to the anterior aspect of the shoulder. Limited ROM of shoulder secondary to pain. Full ROM of elbow, wrist, and fingers. Sensation intact to distal extremity.     A/P:  Pt is a  78y Female with right shoulder dislocation    PLAN:   * Notify medicine of confusion  * OR with Dr. Toledo 24  * NPO after midnight  * NWB of RUE  Pt Name: NOE SANDHU  MRN: [id number]    Patient is a being followed for right dislocated shoulder of unknown chronicity. Patient scheduled for OR tomorrow with Dr. Toledo for closed vs open reduction of right shoulder. Patient admits to pain and inability to move shoulder. Denies numbness or tingling to the RUE. Patient knows who she is and her birthday but unsure of where she is and what month/year we are in. Patient admits to feeling confused.     PAST MEDICAL & SURGICAL HISTORY:  Permanent atrial fibrillation  Hypertension  Hyperlipemia  Diabetes  COPD (chronic obstructive pulmonary disease)  Former smoker  H/O: hysterectomy  H/O lumpectomy    Allergies: penicillins (Unknown)  doxycycline (Unknown)    Medications: acetaminophen     Tablet .. 975 milliGRAM(s) Oral every 8 hours PRN  albuterol    90 MICROgram(s) HFA Inhaler 2 Puff(s) Inhalation every 6 hours PRN  ARIPiprazole 30 milliGRAM(s) Oral daily  atorvastatin 40 milliGRAM(s) Oral at bedtime  ceFAZolin  Injectable. 2000 milliGRAM(s) IV Push once  clonazePAM  Tablet 0.5 milliGRAM(s) Oral at bedtime  dextrose 5%. 1000 milliLiter(s) IV Continuous <Continuous>  dextrose 5%. 1000 milliLiter(s) IV Continuous <Continuous>  dextrose 50% Injectable 25 Gram(s) IV Push once  dextrose 50% Injectable 12.5 Gram(s) IV Push once  dextrose 50% Injectable 25 Gram(s) IV Push once  dextrose Oral Gel 15 Gram(s) Oral once PRN  enalapril 5 milliGRAM(s) Oral daily  glucagon  Injectable 1 milliGRAM(s) IntraMuscular once  influenza  Vaccine (HIGH DOSE) 0.7 milliLiter(s) IntraMuscular once  insulin lispro (ADMELOG) corrective regimen sliding scale   SubCutaneous three times a day before meals  insulin lispro (ADMELOG) corrective regimen sliding scale   SubCutaneous at bedtime  mupirocin 2% Ointment 1 Application(s) Both Nostrils two times a day  nystatin Cream 1 Application(s) Topical two times a day  pantoprazole  Injectable 40 milliGRAM(s) IV Push every 12 hours  povidone iodine 5% Nasal Swab 1 Application(s) Both Nostrils once  vancomycin  IVPB 1500 milliGRAM(s) IV Intermittent once                        9.6    7.30  )-----------( 232      ( 2024 06:23 )             31.6         145  |  105  |  17.2  ----------------------------<  79  3.9   |  27.0  |  0.59    Ca    9.0      2024 06:23  Mg     2.1         TPro  6.4<L>  /  Alb  3.4  /  TBili  1.1  /  DBili  x   /  AST  22  /  ALT  20  /  AlkPhos  174<H>      PHYSICAL EXAM:  Vital Signs Last 24 Hrs  T(C): 36.7 (2024 04:21), Max: 36.9 (2024 19:58)  T(F): 98.1 (2024 04:21), Max: 98.4 (2024 19:58)  HR: 58 (2024 04:21) (58 - 70)  BP: 101/63 (2024 04:21) (101/63 - 157/83)  BP(mean): --  RR: 18 (2024 04:21) (18 - 18)  SpO2: 98% (2024 04:21) (96% - 100%)  Parameters below as of 2024 04:21  Patient On (Oxygen Delivery Method): nasal cannula  Daily Weight in k.2 (2024 04:21)    Appearance: responsive, in no acute distress.  Neurological: Sensation is grossly intact to light touch to the RUE   Vascular: Radial pulse intact RUE. Cap refill < 2 sec. No cyanosis.  Musculoskeletal:  Right Upper Extremity - TTP to the anterior aspect of the shoulder. Limited ROM of shoulder secondary to pain. Full ROM of elbow, wrist, and fingers. Sensation intact to distal extremity.     A/P:  Pt is a  78y Female with right shoulder dislocation    PLAN:   * Medical team made aware of patient confusion  * OR with Dr. Toledo 24  * NPO after midnight  * NWB of RUE

## 2024-02-29 LAB
ANION GAP SERPL CALC-SCNC: 12 MMOL/L — SIGNIFICANT CHANGE UP (ref 5–17)
BASOPHILS # BLD AUTO: 0.03 K/UL — SIGNIFICANT CHANGE UP (ref 0–0.2)
BASOPHILS NFR BLD AUTO: 0.6 % — SIGNIFICANT CHANGE UP (ref 0–2)
BUN SERPL-MCNC: 11.1 MG/DL — SIGNIFICANT CHANGE UP (ref 8–20)
CALCIUM SERPL-MCNC: 8.8 MG/DL — SIGNIFICANT CHANGE UP (ref 8.4–10.5)
CHLORIDE SERPL-SCNC: 103 MMOL/L — SIGNIFICANT CHANGE UP (ref 96–108)
CO2 SERPL-SCNC: 28 MMOL/L — SIGNIFICANT CHANGE UP (ref 22–29)
CREAT SERPL-MCNC: 0.54 MG/DL — SIGNIFICANT CHANGE UP (ref 0.5–1.3)
EGFR: 95 ML/MIN/1.73M2 — SIGNIFICANT CHANGE UP
EOSINOPHIL # BLD AUTO: 0.19 K/UL — SIGNIFICANT CHANGE UP (ref 0–0.5)
EOSINOPHIL NFR BLD AUTO: 3.7 % — SIGNIFICANT CHANGE UP (ref 0–6)
GLUCOSE BLDC GLUCOMTR-MCNC: 100 MG/DL — HIGH (ref 70–99)
GLUCOSE BLDC GLUCOMTR-MCNC: 102 MG/DL — HIGH (ref 70–99)
GLUCOSE BLDC GLUCOMTR-MCNC: 109 MG/DL — HIGH (ref 70–99)
GLUCOSE BLDC GLUCOMTR-MCNC: 82 MG/DL — SIGNIFICANT CHANGE UP (ref 70–99)
GLUCOSE BLDC GLUCOMTR-MCNC: 90 MG/DL — SIGNIFICANT CHANGE UP (ref 70–99)
GLUCOSE SERPL-MCNC: 100 MG/DL — HIGH (ref 70–99)
HCT VFR BLD CALC: 30.3 % — LOW (ref 34.5–45)
HGB BLD-MCNC: 9.5 G/DL — LOW (ref 11.5–15.5)
IMM GRANULOCYTES NFR BLD AUTO: 1 % — HIGH (ref 0–0.9)
LYMPHOCYTES # BLD AUTO: 0.54 K/UL — LOW (ref 1–3.3)
LYMPHOCYTES # BLD AUTO: 10.6 % — LOW (ref 13–44)
MAGNESIUM SERPL-MCNC: 2 MG/DL — SIGNIFICANT CHANGE UP (ref 1.6–2.6)
MCHC RBC-ENTMCNC: 28.9 PG — SIGNIFICANT CHANGE UP (ref 27–34)
MCHC RBC-ENTMCNC: 31.4 GM/DL — LOW (ref 32–36)
MCV RBC AUTO: 92.1 FL — SIGNIFICANT CHANGE UP (ref 80–100)
MONOCYTES # BLD AUTO: 0.51 K/UL — SIGNIFICANT CHANGE UP (ref 0–0.9)
MONOCYTES NFR BLD AUTO: 10 % — SIGNIFICANT CHANGE UP (ref 2–14)
NEUTROPHILS # BLD AUTO: 3.78 K/UL — SIGNIFICANT CHANGE UP (ref 1.8–7.4)
NEUTROPHILS NFR BLD AUTO: 74.1 % — SIGNIFICANT CHANGE UP (ref 43–77)
PHOSPHATE SERPL-MCNC: 3.4 MG/DL — SIGNIFICANT CHANGE UP (ref 2.4–4.7)
PLATELET # BLD AUTO: 228 K/UL — SIGNIFICANT CHANGE UP (ref 150–400)
POTASSIUM SERPL-MCNC: 3.7 MMOL/L — SIGNIFICANT CHANGE UP (ref 3.5–5.3)
POTASSIUM SERPL-SCNC: 3.7 MMOL/L — SIGNIFICANT CHANGE UP (ref 3.5–5.3)
RBC # BLD: 3.29 M/UL — LOW (ref 3.8–5.2)
RBC # FLD: 16.6 % — HIGH (ref 10.3–14.5)
SODIUM SERPL-SCNC: 143 MMOL/L — SIGNIFICANT CHANGE UP (ref 135–145)
WBC # BLD: 5.1 K/UL — SIGNIFICANT CHANGE UP (ref 3.8–10.5)
WBC # FLD AUTO: 5.1 K/UL — SIGNIFICANT CHANGE UP (ref 3.8–10.5)

## 2024-02-29 PROCEDURE — 99232 SBSQ HOSP IP/OBS MODERATE 35: CPT

## 2024-02-29 PROCEDURE — 99233 SBSQ HOSP IP/OBS HIGH 50: CPT

## 2024-02-29 RX ORDER — SODIUM CHLORIDE 9 MG/ML
1000 INJECTION, SOLUTION INTRAVENOUS
Refills: 0 | Status: DISCONTINUED | OUTPATIENT
Start: 2024-02-29 | End: 2024-03-02

## 2024-02-29 RX ADMIN — PANTOPRAZOLE SODIUM 40 MILLIGRAM(S): 20 TABLET, DELAYED RELEASE ORAL at 19:35

## 2024-02-29 RX ADMIN — NYSTATIN CREAM 1 APPLICATION(S): 100000 CREAM TOPICAL at 19:34

## 2024-02-29 RX ADMIN — NYSTATIN CREAM 1 APPLICATION(S): 100000 CREAM TOPICAL at 05:11

## 2024-02-29 RX ADMIN — ATORVASTATIN CALCIUM 40 MILLIGRAM(S): 80 TABLET, FILM COATED ORAL at 22:04

## 2024-02-29 RX ADMIN — PANTOPRAZOLE SODIUM 40 MILLIGRAM(S): 20 TABLET, DELAYED RELEASE ORAL at 05:10

## 2024-02-29 RX ADMIN — IRON SUCROSE 210 MILLIGRAM(S): 20 INJECTION, SOLUTION INTRAVENOUS at 19:08

## 2024-02-29 RX ADMIN — MUPIROCIN 1 APPLICATION(S): 20 OINTMENT TOPICAL at 05:10

## 2024-02-29 RX ADMIN — Medication 0.5 MILLIGRAM(S): at 22:05

## 2024-02-29 RX ADMIN — Medication 5 MILLIGRAM(S): at 05:17

## 2024-02-29 RX ADMIN — MUPIROCIN 1 APPLICATION(S): 20 OINTMENT TOPICAL at 19:14

## 2024-02-29 NOTE — PROGRESS NOTE ADULT - SUBJECTIVE AND OBJECTIVE BOX
ORTHOPEDIC PROGRESS NOTE:    Name: NOE SANDHU    MR #: [id number]        78F is being followed for right shoulder dislocation. Patient seen and evaluated at bedside this AM. Patient complains of shoulder discomfort and decreased ROM. No additional orthopedic complaints are expressed at this time. Patient denies numbness, tingling, or weakness.                Vital Signs Last 24 Hrs  T(C): 37.1 (02-29-24 @ 04:05), Max: 37.1 (02-29-24 @ 04:05)  T(F): 98.7 (02-29-24 @ 04:05), Max: 98.7 (02-29-24 @ 04:05)  HR: 58 (02-29-24 @ 04:05) (58 - 58)  BP: 155/78 (02-29-24 @ 04:05) (155/78 - 155/78)  BP(mean): --  RR: 18 (02-29-24 @ 04:05) (18 - 18)  SpO2: 98% (02-29-24 @ 04:05) (98% - 98%)      General Exam:    General: Pt Alert and oriented, NAD.    Right upper extremity:    Skin warm, dry, and intact. No erythema.    +TTP about the shoulder.     Pulses: 2+ dorsalis radial pulse. Cap refill < 2 sec.    Sensation: Grossly intact to light touch without deficit. Axillary nerve sensation intact.     Motor: No motor weaknesses found. AIN/PIN/Ulnar motor intact.         A/P: 78y Female with right shoulder dislocation.     - Pain Control PRN.   - SCDs.   - NWB right arm.   - Imaging reviewed.  - NPO.  - Pre-op labs.  - Plan for OR today for open vs closed reduction.  ORTHOPEDIC PROGRESS NOTE:    Name: NOE SANDHU    MR #: [id number]        78F is being followed for right shoulder dislocation. Patient seen and evaluated at bedside this AM. Patient complains of shoulder discomfort and decreased ROM. No additional orthopedic complaints are expressed at this time. Patient denies numbness, tingling, or weakness.                Vital Signs Last 24 Hrs  T(C): 37.1 (02-29-24 @ 04:05), Max: 37.1 (02-29-24 @ 04:05)  T(F): 98.7 (02-29-24 @ 04:05), Max: 98.7 (02-29-24 @ 04:05)  HR: 58 (02-29-24 @ 04:05) (58 - 58)  BP: 155/78 (02-29-24 @ 04:05) (155/78 - 155/78)  BP(mean): --  RR: 18 (02-29-24 @ 04:05) (18 - 18)  SpO2: 98% (02-29-24 @ 04:05) (98% - 98%)      General Exam:    General: Pt Alert and oriented, NAD.    Right upper extremity:    Skin warm, dry, and intact. No erythema.    +TTP about the shoulder.     Pulses: 2+ radial pulse. Cap refill < 2 sec.    Sensation: Grossly intact to light touch without deficit. Axillary nerve sensation intact.     Motor: No motor weaknesses found. AIN/PIN/Ulnar motor intact.         A/P: 78y Female with right shoulder dislocation.     - Pain Control PRN.   - SCDs.   - NWB right arm.   - Imaging reviewed.  - NPO.  - Pre-op labs.  - Plan for OR today for open vs closed reduction.

## 2024-02-29 NOTE — PROGRESS NOTE ADULT - SUBJECTIVE AND OBJECTIVE BOX
INTERVAL HISTORY:  Awaiting going to the OR  Feels well  No chest pain or shortness of breath  	  MEDICATIONS:  enalapril 5 milliGRAM(s) Oral daily    ceFAZolin  Injectable. 2000 milliGRAM(s) IV Push once  vancomycin  IVPB 1500 milliGRAM(s) IV Intermittent once    albuterol    90 MICROgram(s) HFA Inhaler 2 Puff(s) Inhalation every 6 hours PRN    acetaminophen     Tablet .. 975 milliGRAM(s) Oral every 8 hours PRN  ARIPiprazole 30 milliGRAM(s) Oral daily  clonazePAM  Tablet 0.5 milliGRAM(s) Oral at bedtime    pantoprazole  Injectable 40 milliGRAM(s) IV Push every 12 hours    atorvastatin 40 milliGRAM(s) Oral at bedtime  dextrose 50% Injectable 25 Gram(s) IV Push once  dextrose 50% Injectable 12.5 Gram(s) IV Push once  dextrose 50% Injectable 25 Gram(s) IV Push once  dextrose Oral Gel 15 Gram(s) Oral once PRN  glucagon  Injectable 1 milliGRAM(s) IntraMuscular once  insulin lispro (ADMELOG) corrective regimen sliding scale   SubCutaneous three times a day before meals  insulin lispro (ADMELOG) corrective regimen sliding scale   SubCutaneous at bedtime    dextrose 5% + sodium chloride 0.45%. 1000 milliLiter(s) IV Continuous <Continuous>  dextrose 5%. 1000 milliLiter(s) IV Continuous <Continuous>  dextrose 5%. 1000 milliLiter(s) IV Continuous <Continuous>  influenza  Vaccine (HIGH DOSE) 0.7 milliLiter(s) IntraMuscular once  iron sucrose IVPB 100 milliGRAM(s) IV Intermittent every 24 hours  mupirocin 2% Ointment 1 Application(s) Both Nostrils two times a day  nystatin Cream 1 Application(s) Topical two times a day  povidone iodine 5% Nasal Swab 1 Application(s) Both Nostrils once        PHYSICAL EXAM:    T(C): 36.3 (02-29-24 @ 09:08), Max: 37.1 (02-29-24 @ 04:05)  HR: 50 (02-29-24 @ 09:08) (50 - 59)  BP: 141/64 (02-29-24 @ 09:08) (130/67 - 155/78)  RR: 18 (02-29-24 @ 09:08) (18 - 18)  SpO2: 100% (02-29-24 @ 09:08) (97% - 100%)  Wt(kg): --    I&O's Summary    28 Feb 2024 07:01  -  29 Feb 2024 07:00  --------------------------------------------------------  IN: 480 mL / OUT: 0 mL / NET: 480 mL        Daily     Daily     Appearance: Normal	  HEENT:   Normal oral mucosa, PERRL, EOMI	  Cardiovascular: Normal S1 S2, No JVD, No murmurs, No edema  Respiratory: Lungs clear to auscultation	  Psychiatry: A & O x 3, Mood & affect appropriate  Gastrointestinal:  Soft, Non-tender, + BS	  Skin: No rashes, No ecchymoses, No cyanosis  Neurologic: Non-focal  Extremities: Edematous right arm                              9.5    5.10  )-----------( 228      ( 29 Feb 2024 05:55 )             30.3     02-29    143  |  103  |  11.1  ----------------------------<  100<H>  3.7   |  28.0  |  0.54    Ca    8.8      29 Feb 2024 05:55  Phos  3.4     02-29  Mg     2.0     02-29      proBNP:   Lipid Profile:   HgA1c:     ASSESSMENT/PLAN: 	  78 year old female former smoker with  PMH of Hypertension, HLD, Diabetes, COPD and Atrial Fibrillation (S/p A-fib ablation with Dr Crowley 6/2023) with continued episodes of paroxysmal A-Fib and remains on Xarelto, who presented to ER with complaints of Right arm pain and increased swelling and bruising. As per patient she hurt her arm approximately 6 weeks ago and had imaging which dtr reported was an Xray and MRI and both studies were reported to be normal. Pt also reports recent few episodes of dark tarry stool in the past few weeks.   Office Echo 2/28/2023 shows Atrial Fibrillation, Normal LVEF 55-60%, Mild-Moderate MR, Mild TR  LHC 3/2023  shows Normal LVEF 55%, Mild to moderate stenosis of the MID LAD and OM3, No AS, Very Mild MR    No cardiac contraindication to orthopedic procedure/surgery

## 2024-02-29 NOTE — PROGRESS NOTE ADULT - SUBJECTIVE AND OBJECTIVE BOX
Chief Complaint:  Patient is a 78y old  Female who presents with a chief complaint of My arm hurts and is bruised and swollen  Anemia (2024 08:17)    HPI/ 24 hr events: Patient seen and examined at bedside. S/p colonoscopy yesterday with findings of moderate diverticulosis of the sigmoid colon and a lipoma noted in the proximal transverse colon. Patient feeling well this morning. No reports of bleeding of bleeding overnight. Vitals are overall stable, no leukocytosis, Hgb 9.5.    REVIEW OF SYSTEMS:   General: Negative  HEENT: Negative  CV: Negative  Respiratory: Negative  GI: See HPI  : Negative  MSK: Negative  Hematologic: Negative  Skin: Negative    MEDICATIONS:   MEDICATIONS  (STANDING):  ARIPiprazole 30 milliGRAM(s) Oral daily  atorvastatin 40 milliGRAM(s) Oral at bedtime  ceFAZolin  Injectable. 2000 milliGRAM(s) IV Push once  clonazePAM  Tablet 0.5 milliGRAM(s) Oral at bedtime  dextrose 5%. 1000 milliLiter(s) (50 mL/Hr) IV Continuous <Continuous>  dextrose 5%. 1000 milliLiter(s) (100 mL/Hr) IV Continuous <Continuous>  dextrose 50% Injectable 25 Gram(s) IV Push once  dextrose 50% Injectable 12.5 Gram(s) IV Push once  dextrose 50% Injectable 25 Gram(s) IV Push once  enalapril 5 milliGRAM(s) Oral daily  glucagon  Injectable 1 milliGRAM(s) IntraMuscular once  influenza  Vaccine (HIGH DOSE) 0.7 milliLiter(s) IntraMuscular once  insulin lispro (ADMELOG) corrective regimen sliding scale   SubCutaneous three times a day before meals  insulin lispro (ADMELOG) corrective regimen sliding scale   SubCutaneous at bedtime  iron sucrose IVPB 100 milliGRAM(s) IV Intermittent every 24 hours  mupirocin 2% Ointment 1 Application(s) Both Nostrils two times a day  nystatin Cream 1 Application(s) Topical two times a day  pantoprazole  Injectable 40 milliGRAM(s) IV Push every 12 hours  povidone iodine 5% Nasal Swab 1 Application(s) Both Nostrils once  vancomycin  IVPB 1500 milliGRAM(s) IV Intermittent once    MEDICATIONS  (PRN):  acetaminophen     Tablet .. 975 milliGRAM(s) Oral every 8 hours PRN Mild Pain (1 - 3), Moderate Pain (4 - 6)  albuterol    90 MICROgram(s) HFA Inhaler 2 Puff(s) Inhalation every 6 hours PRN Shortness of Breath and/or Wheezing  dextrose Oral Gel 15 Gram(s) Oral once PRN Blood Glucose LESS THAN 70 milliGRAM(s)/deciliter      Packed Red Cells Order:  1 Unit  Indication: Hgb <8 gm/dL -Stable Cardiovascular Disease or Acute Co  Infuse Unit : 3 Hours (24 @ 13:36)        DIET:  Diet, NPO after Midnight:      NPO Start Date: 2024,   NPO Start Time: 23:59  Except Medications  With Ice Chips/Sips of Water (24 @ 18:30) [Active]  Diet, NPO after Midnight:      NPO Start Date: 2024,   NPO Start Time: 23:59 (24 @ 10:00) [Active]          ALLERGIES:   Allergies    penicillins (Unknown)  doxycycline (Unknown)    Intolerances        VITAL SIGNS:   Vital Signs Last 24 Hrs  T(C): 37.1 (2024 04:05), Max: 37.1 (2024 04:05)  T(F): 98.7 (2024 04:05), Max: 98.7 (2024 04:05)  HR: 58 (2024 04:05) (50 - 59)  BP: 155/78 (2024 04:05) (130/67 - 155/78)  BP(mean): --  RR: 18 (2024 04:05) (18 - 18)  SpO2: 98% (2024 04:05) (97% - 100%)    Parameters below as of 2024 04:05  Patient On (Oxygen Delivery Method): nasal cannula  O2 Flow (L/min): 2    I&O's Summary    2024 07:01  -  2024 07:00  --------------------------------------------------------  IN: 480 mL / OUT: 0 mL / NET: 480 mL        PHYSICAL EXAM:   GENERAL:  No acute distress  HEENT:  NC/AT, conjunctiva clear, sclera anicteric  CHEST:  diminished bilaterally, on supplemental oxygen    HEART:  Regular rate  ABDOMEN:  Soft, non-tender, non-distended, normoactive bowel sounds, no rebound or guarding  EXTREMITIES: + pedal edema  SKIN:  Warm, dry  NEURO:  Calm, cooperative    LABS:                        9.5    5.10  )-----------( 228      ( 2024 05:55 )             30.3     Hemoglobin: 9.5 g/dL (24 @ 05:55)  Hemoglobin: 9.1 g/dL (24 @ 07:35)  Hemoglobin: 9.6 g/dL (24 @ 06:23)        143  |  103  |  11.1  ----------------------------<  100<H>  3.7   |  28.0  |  0.54    Ca    8.8      2024 05:55  Phos  3.4       Mg     2.0         RADIOLOGY & ADDITIONAL STUDIES:          Colonoscopy Report        Date: 2024 8:53 AM        Patient Name: NOE SANDHU        MRN: [id number]        Account Number:        [id number]        Gender: Female         (age): 1945 (78)        Instrument(s):        [facility] [id number]        Attending/Fellow:        Jennifer Childress MD, DO                Procedure Room #:        PROCEDURE ROOM 2            ASA Class:        P2 - 2024 9:45 AM Jennifer Childress MD,         Administered Medications:        As per Anesthesiology Record        Indications:        Anemia: 285.9 - D64.9        Procedure:        This is a average risk patient  undergoing a diagnostic colonoscopy. Prior    colonoscopy was performed on 2024.        The procedure, indications, preparation and potential complications were    explained to the patient, who indicated understanding and signed the    corresponding consent forms. MAC was administered by the anesthesiologist.    Continuous pulse oximetry and blood pressure monitoring were used throughout the    procedure. Supplemental oxygen was used. The quality of preparation was 6-9 on    the BBP scale/adequate. Patient was placed in left lateral decubitus position.    Digital exam was normal. The colonoscope was introduced through the rectum and    advanced under direct visualization until cecum was reached. The appendiceal    orifice and the ileocecal valve were identified. The terminal ileum was    identified. Careful visualization was performed as the instrument was withdrawn.    Retroflexion in the rectum was performed successfully and there were no    remarkable findings grade II hemorrhoids Patient tolerance to procedure was    excellent. The procedure was not difficult. Blood loss was none.. Withdrawal    time was 6 minutes..        Norman Bowel Preparation Score:        Using the Norman Bowel Preparation Score, each segment of the colon was graded    as follows:        Left Colon: Excellent, 3 | Transverse Colon: Excellent, 3  | Right Colon:    Excellent, 3        Limitations/Complications:        There were no apparent limitations or complications        Findings:        Excavated lesions Several non-bleeding diverticula with medium openings were    seen in the sigmoid colon. Diverticulosis appeared to be of moderate severity.        Additional findings - There was a lipoma noted in the proximal transverse colon.            Impressions:        Moderate diverticulosis of the sigmoid colon.        - There was a lipoma noted in the proximal transverse colon.        Plan:        High Fiber Diet        Additional Notes:        will order CTE        lipoma in the proximal transverse colon        Jennifer Childress MD, DO        Version 2, Electronically signed on 2024 11:48:00 AM by Jennifer Childress MD,    DO        < from: EGD (24 @ 00:00) >    < from: CT Shoulder No Cont, Right (24 @ 16:38) >  ACC: [id number] EXAM:  CT SHOULDER ONLY RT   ORDERED BY: RUSLAN KHAN     PROCEDURE DATE:  2024          INTERPRETATION:  HISTORY: Right shoulder dislocation.    Helical CT imaging of the right shoulder was performed without   intravenous contrast. Sagittal and coronal reformats were provided. 3-D   reformats were performed on a separate workstation.    Correlation is made with radiographs from same day.    FINDINGS:    There is anterior dislocation of the humerus relative to the glenoid.   There is marked flattening of the posterolateral aspect of the humeral   head consistent with a Hill-Sachs deformity. This measures up to 1.9 cm   in craniocaudal dimension and approximately 2.3 cm in transverse   dimension. There is marked flattening and remodeling of the glenoid   articular surface with acquired anteversion. Numerous areas of   heterotopic bone formation is seen within the surrounding joint capsule   and subacromial/subdeltoid bursa.  Imaging features are suggestive ofa   chronic dislocation. There is remodeling along the undersurface of the   coracoid likely related to pseudoarticulation with the dislocated humeral   head. There is a large glenohumeral joint effusion with fluid extending   into the subacromial/subdeltoid bursa.    There is mild acromial clavicular joint arthrosis. Partially imaged   advanced cervical spondylosis.    There is high-grade fatty infiltration of the subscapularis,   supraspinatus, and infraspinatus muscles. There is edema surrounding the   deltoid and right pectoralis major muscles which may be related to muscle   strain in an acute on chronic setting.    There is atherosclerotic disease. Emphysematous changes are seen at the   right lung apex.    IMPRESSION:    Anterior dislocation of the humerus relative to the glenoid. Associated   flattening and remodeling throughout the glenoid articular surface with   acquired anteversion. Large Hill-Sachs deformity. Imaging features are   suggestive of a chronic dislocation. There is remodeling along the   undersurface of the coracoid likely related to pseudoarticulation of the   humeral head. Large glenohumeral joint effusion decompressing into the   subacromial/subdeltoid bursa.    Edema surrounding the deltoid muscle right pectoralis major muscles which   may be related to muscle strain the setting of an acute on chronic injury.    Extensive fatty infiltration within the supraspinatus, infraspinatus, and   subscapularis tendons.    --- End of Report ---            NHAN RODRIGUEZ MD; Attending Radiologist  This document has been electronically signed. 2024  5:31PM    < end of copied text >

## 2024-02-29 NOTE — PROGRESS NOTE ADULT - SUBJECTIVE AND OBJECTIVE BOX
INTERVAL HPI/OVERNIGHT EVENTS:    CC: anemia sec GI bleed, shoulder dislocation, a fib    No overnight events  awaiting OR  denies complaints    Vital Signs Last 24 Hrs  T(C): 36.3 (29 Feb 2024 09:08), Max: 37.1 (29 Feb 2024 04:05)  T(F): 97.3 (29 Feb 2024 09:08), Max: 98.7 (29 Feb 2024 04:05)  HR: 50 (29 Feb 2024 09:08) (50 - 59)  BP: 141/64 (29 Feb 2024 09:08) (130/67 - 155/78)  BP(mean): --  RR: 18 (29 Feb 2024 09:08) (18 - 18)  SpO2: 100% (29 Feb 2024 09:08) (97% - 100%)    Parameters below as of 29 Feb 2024 09:08  Patient On (Oxygen Delivery Method): nasal cannula        PHYSICAL EXAM:    GENERAL: alert, not in distress, obese  CHEST/LUNG: b/l air entry  HEART: reg  ABDOMEN: soft, bs+  EXTREMITIES:  no edema, tenderness    MEDICATIONS  (STANDING):  ARIPiprazole 30 milliGRAM(s) Oral daily  atorvastatin 40 milliGRAM(s) Oral at bedtime  ceFAZolin  Injectable. 2000 milliGRAM(s) IV Push once  clonazePAM  Tablet 0.5 milliGRAM(s) Oral at bedtime  dextrose 5% + sodium chloride 0.45%. 1000 milliLiter(s) (70 mL/Hr) IV Continuous <Continuous>  dextrose 5%. 1000 milliLiter(s) (50 mL/Hr) IV Continuous <Continuous>  dextrose 5%. 1000 milliLiter(s) (100 mL/Hr) IV Continuous <Continuous>  dextrose 50% Injectable 25 Gram(s) IV Push once  dextrose 50% Injectable 12.5 Gram(s) IV Push once  dextrose 50% Injectable 25 Gram(s) IV Push once  enalapril 5 milliGRAM(s) Oral daily  glucagon  Injectable 1 milliGRAM(s) IntraMuscular once  influenza  Vaccine (HIGH DOSE) 0.7 milliLiter(s) IntraMuscular once  insulin lispro (ADMELOG) corrective regimen sliding scale   SubCutaneous three times a day before meals  insulin lispro (ADMELOG) corrective regimen sliding scale   SubCutaneous at bedtime  iron sucrose IVPB 100 milliGRAM(s) IV Intermittent every 24 hours  mupirocin 2% Ointment 1 Application(s) Both Nostrils two times a day  nystatin Cream 1 Application(s) Topical two times a day  pantoprazole  Injectable 40 milliGRAM(s) IV Push every 12 hours  povidone iodine 5% Nasal Swab 1 Application(s) Both Nostrils once  vancomycin  IVPB 1500 milliGRAM(s) IV Intermittent once    MEDICATIONS  (PRN):  acetaminophen     Tablet .. 975 milliGRAM(s) Oral every 8 hours PRN Mild Pain (1 - 3), Moderate Pain (4 - 6)  albuterol    90 MICROgram(s) HFA Inhaler 2 Puff(s) Inhalation every 6 hours PRN Shortness of Breath and/or Wheezing  dextrose Oral Gel 15 Gram(s) Oral once PRN Blood Glucose LESS THAN 70 milliGRAM(s)/deciliter      Allergies    penicillins (Unknown)  doxycycline (Unknown)    Intolerances          LABS:                          9.5    5.10  )-----------( 228      ( 29 Feb 2024 05:55 )             30.3     02-29    143  |  103  |  11.1  ----------------------------<  100<H>  3.7   |  28.0  |  0.54    Ca    8.8      29 Feb 2024 05:55  Phos  3.4     02-29  Mg     2.0     02-29        Urinalysis Basic - ( 29 Feb 2024 05:55 )    Color: x / Appearance: x / SG: x / pH: x  Gluc: 100 mg/dL / Ketone: x  / Bili: x / Urobili: x   Blood: x / Protein: x / Nitrite: x   Leuk Esterase: x / RBC: x / WBC x   Sq Epi: x / Non Sq Epi: x / Bacteria: x        RADIOLOGY & ADDITIONAL TESTS:

## 2024-02-29 NOTE — PROGRESS NOTE ADULT - ASSESSMENT
78-year-old female who is a poor historian with history of HTN, HLD, DM, COPD (no home oxygen), former smoker, atrial fibrillation on Xarelto presenting with increased edema to bilateral upper and lower extremities. Patient states that she was told to stop taking furosemide with last dose 4 days ago. Right shoulder dislocation. GI consulted for anemia.    #anemia  - Hemoglobin 9.5, no overt signs of GIB  - Right CT shoulder: There is anterior dislocation of the humerus relative to the glenoid.   - 2/26/24 EGD: non-erosive gastritis.  - 2/28/24 Colonoscopy: Excavated lesions Several non-bleeding diverticula with medium openings were seen in the sigmoid colon. Diverticulosis appeared to be of moderate severity.  * There was a lipoma noted in the proximal transverse colon.  - Trend CBC, transfuse as needed. Monitor for signs of bleeding.   - Avoid NSAIDs  - IV PPI BID for GI mucosal cytoprotection  - High fiber diet  - CTE ordered  - Plan for OR today for open vs closed reduction  _________________________________________________________________  Assessment and recommendations are final when note is signed by the attending physician.      78-year-old female who is a poor historian with history of HTN, HLD, DM, COPD (no home oxygen), former smoker, atrial fibrillation on Xarelto presenting with increased edema to bilateral upper and lower extremities. Patient states that she was told to stop taking furosemide with last dose 4 days ago. Right shoulder dislocation. GI consulted for anemia.    #anemia  - Right CT shoulder: There is anterior dislocation of the humerus relative to the glenoid.   - 2/26/24 EGD: non-erosive gastritis.  - 2/28/24 Colonoscopy: Excavated lesions Several non-bleeding diverticula with medium openings were seen in the sigmoid colon. Diverticulosis appeared to be of moderate severity.  * There was a lipoma noted in the proximal transverse colon.  - Hemoglobin 9.5, no overt signs of GIB  - Trend CBC, transfuse as needed. Monitor for signs of bleeding.   - Avoid NSAIDs  - IV PPI BID for GI mucosal cytoprotection  - High fiber diet  - CTE ordered- will be performed tomorrow- should be NPO 4 hours prior  - Plan for OR today for open vs closed reduction  _________________________________________________________________  Assessment and recommendations are final when note is signed by the attending physician.

## 2024-02-29 NOTE — PROGRESS NOTE ADULT - ASSESSMENT
78 yr old female with hypertension, hyperlipidemia, diabetes mellitus, COPD, atrial fibrillation on Xarelto, KAJAL on CPAP, schizophrenia presented with complaints of right arm pain and swelling for few weeks after hurting herself at home, ? fall. Also noted to be on Motrin intermittently for pain. Labs revealed Hb 7.6, . She endorsed symptoms of heartburn, dark stools. She was noted to have bradycardia and shoulder imaging revealed Medial right shoulder dislocation. Xarelto and aspirin were held, cardiology, GI and orthopedics consulted. Conscious sedation could not be obtained in ED for reduction, NWB recommended by orthopedics. She was also noted to have bradycardia, hence Amiodarone and calcium channel blockers were held. EGD recommended by GI, she was cleared by cardiology, TTE was ordered. She was given a unit of PRBC. She was noted to have iron deficiency anemia. EGD was done, revealed non erosive gastritis, colonoscopy with moderate diverticulosis and hemorrhoids. CT enterography was recommended by GI. Her CBC remained stable. Orthopedics recommended close vs open reduction for shoulder dislocation.    1. Anemia sec GI bleed, suspect iron deficiency anemia, hemorrhoids, NSAIDS use:  S/p EGD and colonoscopy  continue PPI, bowel regimen  CTE pending  Hb stable  Venofer ordered x 3 doses  follow up path  monitor CBC  avoid NSAIDs  resume AC when cleared by orthopedics and GI    2. Shoulder dislocation:  Plan for OR today  ortho following    3. Atrial fibrillation, hypertension, hyperlipidemia:  Cardiology following  rate controlled  off Xarelto, will resume pending results of CTE, GI and ortho clearance  continue Ator 40 mg QHS and Enalapril 5 mg QD  off CCB and Amiodarone since admission for bradycardia    4. Schizophrenia, anxiety:  On Abilify 30 mg QD and Klonopin 0.5 mg QHS    5. KAJAL:  On CPAP    6. Dermatitis:  Topical Nystatin    7. Diabetes mellitus:  Monitor FS  sliding scale coverage  IVF while waiting for OR    8. DVT ppx:  SCDs    Discussed with patient.  78 yr old female with hypertension, hyperlipidemia, diabetes mellitus, COPD, atrial fibrillation on Xarelto, KAJAL on CPAP, schizophrenia presented with complaints of right arm pain and swelling for few weeks after hurting herself at home, ? fall. Also noted to be on Motrin intermittently for pain. Labs revealed Hb 7.6, . She endorsed symptoms of heartburn, dark stools. She was noted to have bradycardia and shoulder imaging revealed Medial right shoulder dislocation. Xarelto and aspirin were held, cardiology, GI and orthopedics consulted. Conscious sedation could not be obtained in ED for reduction, NWB recommended by orthopedics. She was also noted to have bradycardia, hence Amiodarone and calcium channel blockers were held. EGD recommended by GI, she was cleared by cardiology, TTE was ordered. She was given a unit of PRBC. She was noted to have iron deficiency anemia. EGD was done, revealed non erosive gastritis, colonoscopy with moderate diverticulosis and hemorrhoids. CT enterography was recommended by GI. Her CBC remained stable. Orthopedics recommended close vs open reduction for shoulder dislocation.    1. Anemia sec GI bleed, suspect iron deficiency anemia, hemorrhoids, NSAIDS use:  S/p EGD and colonoscopy  continue PPI, bowel regimen  CTE pending  Hb stable  Venofer ordered x 3 doses  follow up path  monitor CBC  avoid NSAIDs  resume AC when cleared by orthopedics and GI    2. Shoulder dislocation:  Plan for OR today  ortho following    3. Atrial fibrillation, hypertension, hyperlipidemia:  Cardiology following  rate controlled  off Xarelto, will resume pending results of CTE, GI and ortho clearance  continue Ator 40 mg QHS and Enalapril 5 mg QD  off CCB and Amiodarone since admission for bradycardia    4. Schizophrenia, anxiety:  On Abilify 30 mg QD and Klonopin 0.5 mg QHS    5. KAJAL:  On CPAP    6. Dermatitis:  Topical Nystatin    7. Diabetes mellitus:  Monitor FS  sliding scale coverage  IVF while waiting for OR    8. DVT ppx:  SCDs    9. Elevated TSH:  Unclear if related to acute illness vs hypothyroid  will repeat TFTs in am for now    Discussed with patient.

## 2024-03-01 ENCOUNTER — TRANSCRIPTION ENCOUNTER (OUTPATIENT)
Age: 79
End: 2024-03-01

## 2024-03-01 LAB
ANION GAP SERPL CALC-SCNC: 10 MMOL/L — SIGNIFICANT CHANGE UP (ref 5–17)
BASOPHILS # BLD AUTO: 0.03 K/UL — SIGNIFICANT CHANGE UP (ref 0–0.2)
BASOPHILS NFR BLD AUTO: 0.6 % — SIGNIFICANT CHANGE UP (ref 0–2)
BUN SERPL-MCNC: 8.4 MG/DL — SIGNIFICANT CHANGE UP (ref 8–20)
CALCIUM SERPL-MCNC: 8.9 MG/DL — SIGNIFICANT CHANGE UP (ref 8.4–10.5)
CHLORIDE SERPL-SCNC: 105 MMOL/L — SIGNIFICANT CHANGE UP (ref 96–108)
CO2 SERPL-SCNC: 29 MMOL/L — SIGNIFICANT CHANGE UP (ref 22–29)
CREAT SERPL-MCNC: 0.62 MG/DL — SIGNIFICANT CHANGE UP (ref 0.5–1.3)
EGFR: 92 ML/MIN/1.73M2 — SIGNIFICANT CHANGE UP
EOSINOPHIL # BLD AUTO: 0.21 K/UL — SIGNIFICANT CHANGE UP (ref 0–0.5)
EOSINOPHIL NFR BLD AUTO: 3.9 % — SIGNIFICANT CHANGE UP (ref 0–6)
GLUCOSE BLDC GLUCOMTR-MCNC: 100 MG/DL — HIGH (ref 70–99)
GLUCOSE BLDC GLUCOMTR-MCNC: 106 MG/DL — HIGH (ref 70–99)
GLUCOSE BLDC GLUCOMTR-MCNC: 107 MG/DL — HIGH (ref 70–99)
GLUCOSE BLDC GLUCOMTR-MCNC: 128 MG/DL — HIGH (ref 70–99)
GLUCOSE SERPL-MCNC: 91 MG/DL — SIGNIFICANT CHANGE UP (ref 70–99)
HCT VFR BLD CALC: 30.3 % — LOW (ref 34.5–45)
HGB BLD-MCNC: 9.4 G/DL — LOW (ref 11.5–15.5)
IMM GRANULOCYTES NFR BLD AUTO: 0.6 % — SIGNIFICANT CHANGE UP (ref 0–0.9)
LYMPHOCYTES # BLD AUTO: 0.57 K/UL — LOW (ref 1–3.3)
LYMPHOCYTES # BLD AUTO: 10.6 % — LOW (ref 13–44)
MAGNESIUM SERPL-MCNC: 1.9 MG/DL — SIGNIFICANT CHANGE UP (ref 1.6–2.6)
MCHC RBC-ENTMCNC: 28.5 PG — SIGNIFICANT CHANGE UP (ref 27–34)
MCHC RBC-ENTMCNC: 31 GM/DL — LOW (ref 32–36)
MCV RBC AUTO: 91.8 FL — SIGNIFICANT CHANGE UP (ref 80–100)
MONOCYTES # BLD AUTO: 0.55 K/UL — SIGNIFICANT CHANGE UP (ref 0–0.9)
MONOCYTES NFR BLD AUTO: 10.3 % — SIGNIFICANT CHANGE UP (ref 2–14)
NEUTROPHILS # BLD AUTO: 3.97 K/UL — SIGNIFICANT CHANGE UP (ref 1.8–7.4)
NEUTROPHILS NFR BLD AUTO: 74 % — SIGNIFICANT CHANGE UP (ref 43–77)
PHOSPHATE SERPL-MCNC: 3.3 MG/DL — SIGNIFICANT CHANGE UP (ref 2.4–4.7)
PLATELET # BLD AUTO: 229 K/UL — SIGNIFICANT CHANGE UP (ref 150–400)
POTASSIUM SERPL-MCNC: 3.6 MMOL/L — SIGNIFICANT CHANGE UP (ref 3.5–5.3)
POTASSIUM SERPL-SCNC: 3.6 MMOL/L — SIGNIFICANT CHANGE UP (ref 3.5–5.3)
RBC # BLD: 3.3 M/UL — LOW (ref 3.8–5.2)
RBC # FLD: 17 % — HIGH (ref 10.3–14.5)
SODIUM SERPL-SCNC: 144 MMOL/L — SIGNIFICANT CHANGE UP (ref 135–145)
T3 SERPL-MCNC: 97 NG/DL — SIGNIFICANT CHANGE UP (ref 80–200)
T4 AB SER-ACNC: 9.7 UG/DL — SIGNIFICANT CHANGE UP (ref 4.5–12)
TSH SERPL-MCNC: 11.54 UIU/ML — HIGH (ref 0.27–4.2)
WBC # BLD: 5.36 K/UL — SIGNIFICANT CHANGE UP (ref 3.8–10.5)
WBC # FLD AUTO: 5.36 K/UL — SIGNIFICANT CHANGE UP (ref 3.8–10.5)

## 2024-03-01 PROCEDURE — 99233 SBSQ HOSP IP/OBS HIGH 50: CPT

## 2024-03-01 PROCEDURE — 99232 SBSQ HOSP IP/OBS MODERATE 35: CPT

## 2024-03-01 PROCEDURE — 74177 CT ABD & PELVIS W/CONTRAST: CPT | Mod: 26

## 2024-03-01 RX ORDER — CLONAZEPAM 1 MG
0.5 TABLET ORAL AT BEDTIME
Refills: 0 | Status: DISCONTINUED | OUTPATIENT
Start: 2024-03-01 | End: 2024-03-02

## 2024-03-01 RX ORDER — RIVAROXABAN 15 MG-20MG
20 KIT ORAL
Refills: 0 | Status: DISCONTINUED | OUTPATIENT
Start: 2024-03-01 | End: 2024-03-02

## 2024-03-01 RX ADMIN — Medication 0.5 MILLIGRAM(S): at 23:19

## 2024-03-01 RX ADMIN — PANTOPRAZOLE SODIUM 40 MILLIGRAM(S): 20 TABLET, DELAYED RELEASE ORAL at 05:00

## 2024-03-01 RX ADMIN — Medication 5 MILLIGRAM(S): at 05:01

## 2024-03-01 RX ADMIN — ARIPIPRAZOLE 30 MILLIGRAM(S): 15 TABLET ORAL at 11:41

## 2024-03-01 RX ADMIN — ATORVASTATIN CALCIUM 40 MILLIGRAM(S): 80 TABLET, FILM COATED ORAL at 23:19

## 2024-03-01 RX ADMIN — RIVAROXABAN 20 MILLIGRAM(S): KIT at 18:34

## 2024-03-01 RX ADMIN — PANTOPRAZOLE SODIUM 40 MILLIGRAM(S): 20 TABLET, DELAYED RELEASE ORAL at 17:53

## 2024-03-01 RX ADMIN — NYSTATIN CREAM 1 APPLICATION(S): 100000 CREAM TOPICAL at 18:44

## 2024-03-01 RX ADMIN — MUPIROCIN 1 APPLICATION(S): 20 OINTMENT TOPICAL at 05:01

## 2024-03-01 RX ADMIN — NYSTATIN CREAM 1 APPLICATION(S): 100000 CREAM TOPICAL at 05:01

## 2024-03-01 RX ADMIN — IRON SUCROSE 210 MILLIGRAM(S): 20 INJECTION, SOLUTION INTRAVENOUS at 18:06

## 2024-03-01 RX ADMIN — MUPIROCIN 1 APPLICATION(S): 20 OINTMENT TOPICAL at 17:54

## 2024-03-01 NOTE — DISCHARGE NOTE NURSING/CASE MANAGEMENT/SOCIAL WORK - NSDCFUADDAPPT_GEN_ALL_CORE_FT
******* STAR CHF*******  patient agreeable to Home Care  Agreeable to ViVo meds to bed  Declined Cardiologist Appointment.

## 2024-03-01 NOTE — PROGRESS NOTE ADULT - PROVIDER SPECIALTY LIST ADULT
Gastroenterology
Hospitalist
Internal Medicine
Intervent Cardiology
Intervent Cardiology
Orthopedics
Cardiology
Gastroenterology
Gastroenterology
Hospitalist
Intervent Cardiology
Intervent Cardiology
Orthopedics
Orthopedics
Cardiology
Intervent Cardiology
Orthopedics
Gastroenterology
Hospitalist

## 2024-03-01 NOTE — PROGRESS NOTE ADULT - SUBJECTIVE AND OBJECTIVE BOX
Chief Complaint:  Patient is a 78y old  Female who presents with a chief complaint of My arm hurts and is bruised and swollen Anemia (2024 16:23)    Interval HPI/ 24 hr events: Patient seen and examined at bedside. Patient feeling well this morning. No complaints this morning. Denies nausea, vomiting, diarrhea, abdominal pain, hematemesis, hematochezia, melena. Vitals are overall stable, no leukocytosis, Hgb 9.4.      REVIEW OF SYSTEMS:   General: Negative  HEENT: Negative  CV: Negative  Respiratory: Negative  GI: See HPI  : Negative  MSK: Negative  Hematologic: Negative  Skin: Negative    MEDICATIONS:   MEDICATIONS  (STANDING):  ARIPiprazole 30 milliGRAM(s) Oral daily  atorvastatin 40 milliGRAM(s) Oral at bedtime  dextrose 5% + sodium chloride 0.45%. 1000 milliLiter(s) (70 mL/Hr) IV Continuous <Continuous>  dextrose 5%. 1000 milliLiter(s) (100 mL/Hr) IV Continuous <Continuous>  dextrose 5%. 1000 milliLiter(s) (50 mL/Hr) IV Continuous <Continuous>  dextrose 50% Injectable 12.5 Gram(s) IV Push once  dextrose 50% Injectable 25 Gram(s) IV Push once  dextrose 50% Injectable 25 Gram(s) IV Push once  enalapril 5 milliGRAM(s) Oral daily  glucagon  Injectable 1 milliGRAM(s) IntraMuscular once  influenza  Vaccine (HIGH DOSE) 0.7 milliLiter(s) IntraMuscular once  insulin lispro (ADMELOG) corrective regimen sliding scale   SubCutaneous three times a day before meals  insulin lispro (ADMELOG) corrective regimen sliding scale   SubCutaneous at bedtime  iron sucrose IVPB 100 milliGRAM(s) IV Intermittent every 24 hours  mupirocin 2% Ointment 1 Application(s) Both Nostrils two times a day  nystatin Cream 1 Application(s) Topical two times a day  pantoprazole  Injectable 40 milliGRAM(s) IV Push every 12 hours    MEDICATIONS  (PRN):  acetaminophen     Tablet .. 975 milliGRAM(s) Oral every 8 hours PRN Mild Pain (1 - 3), Moderate Pain (4 - 6)  albuterol    90 MICROgram(s) HFA Inhaler 2 Puff(s) Inhalation every 6 hours PRN Shortness of Breath and/or Wheezing  dextrose Oral Gel 15 Gram(s) Oral once PRN Blood Glucose LESS THAN 70 milliGRAM(s)/deciliter      Packed Red Cells Order:  1 Unit  Indication: Hgb <8 gm/dL -Stable Cardiovascular Disease or Acute Co  Infuse Unit : 3 Hours (24 @ 13:36)        DIET:  Diet, DASH/TLC:   Sodium & Cholesterol Restricted (24 @ 17:22) [Active]          ALLERGIES:   Allergies    penicillins (Unknown)  doxycycline (Unknown)    Intolerances        VITAL SIGNS:   Vital Signs Last 24 Hrs  T(C): 36.8 (01 Mar 2024 04:37), Max: 36.8 (01 Mar 2024 04:37)  T(F): 98.3 (01 Mar 2024 04:37), Max: 98.3 (01 Mar 2024 04:37)  HR: 61 (01 Mar 2024 04:37) (50 - 66)  BP: 134/64 (01 Mar 2024 04:37) (132/83 - 150/67)  BP(mean): --  RR: 18 (01 Mar 2024 04:37) (17 - 18)  SpO2: 97% (01 Mar 2024 04:37) (94% - 100%)    Parameters below as of 01 Mar 2024 04:37  Patient On (Oxygen Delivery Method): nasal cannula      I&O's Summary    2024 07:01  -  01 Mar 2024 07:00  --------------------------------------------------------  IN: 1250 mL / OUT: 0 mL / NET: 1250 mL        PHYSICAL EXAM:   GENERAL:  No acute distress  HEENT:  NC/AT, conjunctiva clear, sclera anicteric  CHEST:  on supplemental oxygen   HEART:  Regular rate  ABDOMEN:  Soft, +obese, non-tender, non-distended, normoactive bowel sounds, no rebound or guarding  EXTREMITIES: edema bilateral arms and legs  SKIN:  Warm, dry  NEURO:  Calm, cooperative    LABS:                        9.4    5.36  )-----------( 229      ( 01 Mar 2024 05:50 )             30.3     Hemoglobin: 9.4 g/dL (24 @ 05:50)  Hemoglobin: 9.5 g/dL (24 @ 05:55)  Hemoglobin: 9.1 g/dL (24 @ 07:35)        144  |  105  |  8.4  ----------------------------<  91  3.6   |  29.0  |  0.62    Ca    8.9      01 Mar 2024 05:50  Phos  3.3       Mg     1.9         RADIOLOGY & ADDITIONAL STUDIES:          Colonoscopy Report        Date: 2024 8:53 AM        Patient Name: NOE SANDHU        MRN: [id number]        Account Number:        [id number]        Gender: Female         (age): 1945 (78)        Instrument(s):        PCF [id number]        Attending/Fellow:        Jennifer Childress MD, DO                Procedure Room #:        PROCEDURE ROOM 2                        ASA Class:        P2 - 2024 9:45 AM Jennifer Childress MD, DO        Administered Medications:        As per Anesthesiology Record        Indications:        Anemia: 285.9 - D64.9        Procedure:        This is a average risk patient  undergoing a diagnostic colonoscopy. Prior    colonoscopy was performed on 2024.        The procedure, indications, preparation and potential complications were    explained to the patient, who indicated understanding and signed the    corresponding consent forms. MAC was administered by the anesthesiologist.    Continuous pulse oximetry and blood pressure monitoring were used throughout the    procedure. Supplemental oxygen was used. The quality of preparation was 6-9 on    the BBP scale/adequate. Patient was placed in left lateral decubitus position.    Digital exam was normal. The colonoscope was introduced through the rectum and    advanced under direct visualization until cecum was reached. The appendiceal    orifice and the ileocecal valve were identified. The terminal ileum was    identified. Careful visualization was performed as the instrument was withdrawn.    Retroflexion in the rectum was performed successfully and there were no    remarkable findings grade II hemorrhoids Patient tolerance to procedure was    excellent. The procedure was not difficult. Blood loss was none.. Withdrawal    time was 6 minutes..        [place] Bowel Preparation Score:        Using the [place] Bowel Preparation Score, each segment of the colon was graded    as follows:        Left Colon: Excellent, 3 | Transverse Colon: Excellent, 3  | Right Colon:    Excellent, 3        Limitations/Complications:        There were no apparent limitations or complications      Findings:      Excavated lesions Several non-bleeding diverticula with medium openings were    seen in the sigmoid colon. Diverticulosis appeared to be of moderate severity.        Additional findings - There was a lipoma noted in the proximal transverse colon.            Impressions:        Moderate diverticulosis of the sigmoid colon.        - There was a lipoma noted in the proximal transverse colon.        Plan:        High Fiber Diet        Additional Notes:        will order CTE        lipoma in the proximal transverse colon        Jennifer Childress MD, DO        Version 2, Electronically signed on 2024 11:48:00 AM by Jennifer Childress MD,    DO      < from: EGD (. @ 00:00) >

## 2024-03-01 NOTE — DISCHARGE NOTE NURSING/CASE MANAGEMENT/SOCIAL WORK - PATIENT PORTAL LINK FT
You can access the FollowMyHealth Patient Portal offered by [facility] by registering at the following website: [email or web address]. By joining [facility]’s FollowMyHealth portal, you will also be able to view your health information using other applications (apps) compatible with our system.

## 2024-03-01 NOTE — PROGRESS NOTE ADULT - NS_MD_PANP_GEN_ALL_CORE
Problem: Non-Pressure Injury Wound  Intervention: # Assess and measure wound every 7 days and if status is deteriorating.  Enter today's date indicating that the wound was measured.  This will produce a worklist task that will fire 7 days from the date entered to repeat the measurement.    RN spent time discussing Tetragrips and compression socks including when to use, care for, life expectancy of socks, and size to buy.  Patient seems to understand and accept. Patient is given an extra pain of Tetragrips and information on size (box label for size F).         
Attending and PA/NP shared services statement (NON-critical care):

## 2024-03-01 NOTE — PROGRESS NOTE ADULT - REASON FOR ADMISSION
My arm hurts and is bruised and swollen  Anemia

## 2024-03-01 NOTE — PROGRESS NOTE ADULT - SUBJECTIVE AND OBJECTIVE BOX
ORTHOPEDIC PROGRESS NOTE:    Name: NOE SANDHU    MR #: [id number]        78F is being followed for right shoulder dislocation. Patient seen and evaluated at bedside this AM. Patient complains of shoulder discomfort and decreased ROM. No additional orthopedic complaints are expressed at this time. Patient denies numbness, tingling, or weakness.  States pain has been improving.               Vital Signs Last 24 Hrs  T(C): 37.1 (02-29-24 @ 04:05), Max: 37.1 (02-29-24 @ 04:05)  T(F): 98.7 (02-29-24 @ 04:05), Max: 98.7 (02-29-24 @ 04:05)  HR: 58 (02-29-24 @ 04:05) (58 - 58)  BP: 155/78 (02-29-24 @ 04:05) (155/78 - 155/78)  BP(mean): --  RR: 18 (02-29-24 @ 04:05) (18 - 18)  SpO2: 98% (02-29-24 @ 04:05) (98% - 98%)      General Exam:    General: Pt Alert and oriented, NAD.    Right upper extremity:    Skin warm, dry, and intact. No erythema.    +TTP about the shoulder.     Pulses: 2+ radial pulse. Cap refill < 2 sec.    Sensation: Grossly intact to light touch without deficit. Axillary nerve sensation intact.     Motor: No motor weaknesses found. AIN/PIN/Ulnar motor intact.         A/P: 78y Female with right chronic shoulder dislocation.     - Pain Control PRN.   - SCDs.   - WBAT right arm.   - Imaging reviewed.  - Shoulder dislocation is chronic, would not recommend intervention at this time.   - Only options are non-op or RTSA. given currently ongoing medical issues would defer RTSA until much improved and only if patient unable to tolerate non-operative management.  - May follow up with previous orthopedist as outpatient.     Panchito Marino M.D.  Orthopaedic Trauma Surgery

## 2024-03-01 NOTE — PROGRESS NOTE ADULT - ASSESSMENT
78 yr old female with hypertension, hyperlipidemia, diabetes mellitus, COPD, atrial fibrillation on Xarelto, KAJAL on CPAP, schizophrenia presented with complaints of right arm pain and swelling for few weeks after hurting herself at home, ? fall. Also noted to be on Motrin intermittently for pain. Labs revealed Hb 7.6, . She endorsed symptoms of heartburn, dark stools. She was noted to have bradycardia and shoulder imaging revealed Medial right shoulder dislocation. Xarelto and aspirin were held, cardiology, GI and orthopedics consulted. Conscious sedation could not be obtained in ED for reduction, NWB recommended by orthopedics. She was also noted to have bradycardia, hence Amiodarone and calcium channel blockers were held. EGD recommended by GI, she was cleared by cardiology, TTE was ordered. She was given a unit of PRBC. She was noted to have iron deficiency anemia. EGD was done, revealed non erosive gastritis, colonoscopy with moderate diverticulosis and hemorrhoids. CT enterography was recommended by GI. Her CBC remained stable. Orthopedics recommended close vs open reduction for shoulder dislocation.    1. Anemia sec GI bleed, suspect iron deficiency anemia, hemorrhoids, NSAIDS use:  S/p EGD and colonoscopy  continue PPI, bowel regimen  CTE no source of bleeding  Hb stable  Venofer ordered x 3 doses  follow up path  monitor CBC  avoid NSAIDs  resume AC when cleared by GI    2. Shoulder dislocation:  Ortho follow up appreciated  rec non operative management for now  WBAT rec  PT and OT consulted.    3. Atrial fibrillation, hypertension, hyperlipidemia:  Cardiology following, ECHO with [facility], but recent cath March 2023. discussed with cardio  rec medical management and close follow up as outpatient.   rate controlled  off Xarelto, will resume if cleared by GI  continue Ator 40 mg QHS and Enalapril 5 mg QD  off CCB and Amiodarone since admission for bradycardia, rate are controlled off these medications      4. Schizophrenia, anxiety:  On Abilify 30 mg QD and Klonopin 0.5 mg QHS    5. KAJAL:  On CPAP    6. Dermatitis:  Topical Nystatin    7. Diabetes mellitus:  Monitor FS  sliding scale coverage    8. DVT ppx:  SCDs    9. Elevated TSH:  Unclear if related to acute illness vs hypothyroid  t3,t4 noted  discussed above with daughter, repeat levels in 4-6 weeks.     Discussed with patient.  Updated daughter Nu at bedside.  PT/OT eval requested.  Home oxygen evaluation.  Discharge planning in 24-48 hrs.

## 2024-03-01 NOTE — PROGRESS NOTE ADULT - NS ATTEND AMEND GEN_ALL_CORE FT
I agree with assessment and plan as above. 79 yo F with PMH COPD, DM, HTN, former smoker, Afib on Xarelto, who presented due to anasarca and shoulder dislocation. GI consulted due to anemia. There has been no overt GI bleeding. She is s/p EGD (gastritis) and colonoscopy (diverticulosis, lipoma) without obvious etiology of anemia. Hgb has been stable. She is planned for OR today for shoulder dislocation. CTE is ordered and will be performed tomorrow.
Patient with anemia, with negative EGD and Colonoscopy. CT enterography in progress. If CT is negative, GI will sign off
77 y/o F with PMHx HTN, HLD, DM, COPD (no home oxygen), former smoker, atrial fibrillation on Xarelto presenting with dislocated shoulder, unclear chronicity. Planned EGD today but T 94 overnight so observing for now. Hb 11--7 but pt also does not report any change in stool color. Distant EGD/colon >10 y ago, no results.   Plan:  will trend Hb over weekend, reassess if can have EGD Mon  tolerating regular diet  PPI IV BID  denies cough, dysuria but would benefit from infectious w/u to eval hypothermia

## 2024-03-01 NOTE — PROGRESS NOTE ADULT - SUBJECTIVE AND OBJECTIVE BOX
INTERVAL HPI/OVERNIGHT EVENTS:    CC: anemia sec GI bleed, shoulder dislocation, a fib    No overnight events  feels better  discussed results of CTE with patient and daughter  OR canceled yesterday  ortho follow up done this morning.     Vital Signs Last 24 Hrs  T(C): 36.3 (01 Mar 2024 12:27), Max: 36.8 (01 Mar 2024 04:37)  T(F): 97.4 (01 Mar 2024 12:27), Max: 98.3 (01 Mar 2024 04:37)  HR: 73 (01 Mar 2024 10:15) (61 - 73)  BP: 167/80 (01 Mar 2024 10:15) (133/76 - 167/80)  BP(mean): --  RR: 18 (01 Mar 2024 10:15) (17 - 18)  SpO2: 100% (01 Mar 2024 10:15) (94% - 100%)    Parameters below as of 01 Mar 2024 10:15  Patient On (Oxygen Delivery Method): nasal cannula  O2 Flow (L/min): 2      PHYSICAL EXAM:    GENERAL: alert, obese, not in distress  CHEST/LUNG: b/l air entry  HEART: reg  ABDOMEN: soft, bs+  EXTREMITIES:  no edema, tenderness    MEDICATIONS  (STANDING):  ARIPiprazole 30 milliGRAM(s) Oral daily  atorvastatin 40 milliGRAM(s) Oral at bedtime  clonazePAM  Tablet 0.5 milliGRAM(s) Oral at bedtime  dextrose 5% + sodium chloride 0.45%. 1000 milliLiter(s) (70 mL/Hr) IV Continuous <Continuous>  dextrose 5%. 1000 milliLiter(s) (100 mL/Hr) IV Continuous <Continuous>  dextrose 5%. 1000 milliLiter(s) (50 mL/Hr) IV Continuous <Continuous>  dextrose 50% Injectable 25 Gram(s) IV Push once  dextrose 50% Injectable 12.5 Gram(s) IV Push once  dextrose 50% Injectable 25 Gram(s) IV Push once  enalapril 5 milliGRAM(s) Oral daily  glucagon  Injectable 1 milliGRAM(s) IntraMuscular once  influenza  Vaccine (HIGH DOSE) 0.7 milliLiter(s) IntraMuscular once  insulin lispro (ADMELOG) corrective regimen sliding scale   SubCutaneous three times a day before meals  insulin lispro (ADMELOG) corrective regimen sliding scale   SubCutaneous at bedtime  iron sucrose IVPB 100 milliGRAM(s) IV Intermittent every 24 hours  mupirocin 2% Ointment 1 Application(s) Both Nostrils two times a day  nystatin Cream 1 Application(s) Topical two times a day  pantoprazole  Injectable 40 milliGRAM(s) IV Push every 12 hours    MEDICATIONS  (PRN):  acetaminophen     Tablet .. 975 milliGRAM(s) Oral every 8 hours PRN Mild Pain (1 - 3), Moderate Pain (4 - 6)  albuterol    90 MICROgram(s) HFA Inhaler 2 Puff(s) Inhalation every 6 hours PRN Shortness of Breath and/or Wheezing  dextrose Oral Gel 15 Gram(s) Oral once PRN Blood Glucose LESS THAN 70 milliGRAM(s)/deciliter      Allergies    penicillins (Unknown)  doxycycline (Unknown)    Intolerances          LABS:                          9.4    5.36  )-----------( 229      ( 01 Mar 2024 05:50 )             30.3     03-01    144  |  105  |  8.4  ----------------------------<  91  3.6   |  29.0  |  0.62    Ca    8.9      01 Mar 2024 05:50  Phos  3.3     03-01  Mg     1.9     03-01        Urinalysis Basic - ( 01 Mar 2024 05:50 )    Color: x / Appearance: x / SG: x / pH: x  Gluc: 91 mg/dL / Ketone: x  / Bili: x / Urobili: x   Blood: x / Protein: x / Nitrite: x   Leuk Esterase: x / RBC: x / WBC x   Sq Epi: x / Non Sq Epi: x / Bacteria: x        RADIOLOGY & ADDITIONAL TESTS:

## 2024-03-01 NOTE — PROGRESS NOTE ADULT - ASSESSMENT
78-year-old female who is a poor historian with history of HTN, HLD, DM, COPD (no home oxygen), former smoker, atrial fibrillation on Xarelto presenting with increased edema to bilateral upper and lower extremities. Patient states that she was told to stop taking furosemide with last dose 4 days ago. Right shoulder dislocation. GI consulted for anemia.    #anemia  - Right CT shoulder: There is anterior dislocation of the humerus relative to the glenoid.   - 2/26/24 EGD: non-erosive gastritis.  - 2/28/24 Colonoscopy: Excavated lesions Several non-bleeding diverticula with medium openings were seen in the sigmoid colon. Diverticulosis appeared to be of moderate severity.  * There was a lipoma noted in the proximal transverse colon.  - Hemoglobin 9.4, no overt signs of GIB  - Trend CBC, transfuse as needed. Monitor for signs of bleeding.   - Avoid NSAIDs  - IV PPI BID for GI mucosal cytoprotection  - High fiber diet  - CTE in progress  - Plan for OR for open vs closed reduction- patient reports she was cancelled yesterday   _________________________________________________________________  Assessment and recommendations are final when note is signed by the attending physician.  78-year-old female who is a poor historian with history of HTN, HLD, DM, COPD (no home oxygen), former smoker, atrial fibrillation on Xarelto presenting with increased edema to bilateral upper and lower extremities. Patient states that she was told to stop taking furosemide with last dose 4 days ago. Right shoulder dislocation. GI consulted for anemia.    #anemia  - Right CT shoulder: There is anterior dislocation of the humerus relative to the glenoid.   - 2/26/24 EGD: non-erosive gastritis.  - 2/28/24 Colonoscopy: Excavated lesions Several non-bleeding diverticula with medium openings were seen in the sigmoid colon. Diverticulosis appeared to be of moderate severity.  * There was a lipoma noted in the proximal transverse colon.  - Hemoglobin 9.4, no overt signs of GIB  - Trend CBC, transfuse as needed. Monitor for signs of bleeding.   - Avoid NSAIDs  - IV PPI BID for GI mucosal cytoprotection  - High fiber diet  - CTE in progress  - Plan for OR for open vs closed reduction- patient reports she was cancelled yesterday

## 2024-03-02 VITALS
RESPIRATION RATE: 18 BRPM | OXYGEN SATURATION: 100 % | DIASTOLIC BLOOD PRESSURE: 88 MMHG | HEART RATE: 64 BPM | TEMPERATURE: 97 F | SYSTOLIC BLOOD PRESSURE: 158 MMHG

## 2024-03-02 LAB
ANION GAP SERPL CALC-SCNC: 10 MMOL/L — SIGNIFICANT CHANGE UP (ref 5–17)
BASOPHILS # BLD AUTO: 0.04 K/UL — SIGNIFICANT CHANGE UP (ref 0–0.2)
BASOPHILS NFR BLD AUTO: 0.7 % — SIGNIFICANT CHANGE UP (ref 0–2)
BUN SERPL-MCNC: 10.3 MG/DL — SIGNIFICANT CHANGE UP (ref 8–20)
CALCIUM SERPL-MCNC: 8.7 MG/DL — SIGNIFICANT CHANGE UP (ref 8.4–10.5)
CHLORIDE SERPL-SCNC: 103 MMOL/L — SIGNIFICANT CHANGE UP (ref 96–108)
CO2 SERPL-SCNC: 30 MMOL/L — HIGH (ref 22–29)
CREAT SERPL-MCNC: 0.52 MG/DL — SIGNIFICANT CHANGE UP (ref 0.5–1.3)
EGFR: 96 ML/MIN/1.73M2 — SIGNIFICANT CHANGE UP
EOSINOPHIL # BLD AUTO: 0.19 K/UL — SIGNIFICANT CHANGE UP (ref 0–0.5)
EOSINOPHIL NFR BLD AUTO: 3.2 % — SIGNIFICANT CHANGE UP (ref 0–6)
GLUCOSE BLDC GLUCOMTR-MCNC: 110 MG/DL — HIGH (ref 70–99)
GLUCOSE BLDC GLUCOMTR-MCNC: 134 MG/DL — HIGH (ref 70–99)
GLUCOSE SERPL-MCNC: 95 MG/DL — SIGNIFICANT CHANGE UP (ref 70–99)
HCT VFR BLD CALC: 29.3 % — LOW (ref 34.5–45)
HGB BLD-MCNC: 9.2 G/DL — LOW (ref 11.5–15.5)
IMM GRANULOCYTES NFR BLD AUTO: 1 % — HIGH (ref 0–0.9)
LYMPHOCYTES # BLD AUTO: 0.65 K/UL — LOW (ref 1–3.3)
LYMPHOCYTES # BLD AUTO: 11 % — LOW (ref 13–44)
MCHC RBC-ENTMCNC: 29.1 PG — SIGNIFICANT CHANGE UP (ref 27–34)
MCHC RBC-ENTMCNC: 31.4 GM/DL — LOW (ref 32–36)
MCV RBC AUTO: 92.7 FL — SIGNIFICANT CHANGE UP (ref 80–100)
MONOCYTES # BLD AUTO: 0.64 K/UL — SIGNIFICANT CHANGE UP (ref 0–0.9)
MONOCYTES NFR BLD AUTO: 10.8 % — SIGNIFICANT CHANGE UP (ref 2–14)
NEUTROPHILS # BLD AUTO: 4.34 K/UL — SIGNIFICANT CHANGE UP (ref 1.8–7.4)
NEUTROPHILS NFR BLD AUTO: 73.3 % — SIGNIFICANT CHANGE UP (ref 43–77)
PLATELET # BLD AUTO: 224 K/UL — SIGNIFICANT CHANGE UP (ref 150–400)
POTASSIUM SERPL-MCNC: 3.6 MMOL/L — SIGNIFICANT CHANGE UP (ref 3.5–5.3)
POTASSIUM SERPL-SCNC: 3.6 MMOL/L — SIGNIFICANT CHANGE UP (ref 3.5–5.3)
RBC # BLD: 3.16 M/UL — LOW (ref 3.8–5.2)
RBC # FLD: 16.9 % — HIGH (ref 10.3–14.5)
SODIUM SERPL-SCNC: 143 MMOL/L — SIGNIFICANT CHANGE UP (ref 135–145)
WBC # BLD: 5.92 K/UL — SIGNIFICANT CHANGE UP (ref 3.8–10.5)
WBC # FLD AUTO: 5.92 K/UL — SIGNIFICANT CHANGE UP (ref 3.8–10.5)

## 2024-03-02 PROCEDURE — 83036 HEMOGLOBIN GLYCOSYLATED A1C: CPT

## 2024-03-02 PROCEDURE — 86901 BLOOD TYPING SEROLOGIC RH(D): CPT

## 2024-03-02 PROCEDURE — 84466 ASSAY OF TRANSFERRIN: CPT

## 2024-03-02 PROCEDURE — 73200 CT UPPER EXTREMITY W/O DYE: CPT | Mod: MC

## 2024-03-02 PROCEDURE — 84484 ASSAY OF TROPONIN QUANT: CPT

## 2024-03-02 PROCEDURE — 74177 CT ABD & PELVIS W/CONTRAST: CPT | Mod: MC

## 2024-03-02 PROCEDURE — 93306 TTE W/DOPPLER COMPLETE: CPT

## 2024-03-02 PROCEDURE — 85018 HEMOGLOBIN: CPT

## 2024-03-02 PROCEDURE — 85025 COMPLETE CBC W/AUTO DIFF WBC: CPT

## 2024-03-02 PROCEDURE — 85730 THROMBOPLASTIN TIME PARTIAL: CPT

## 2024-03-02 PROCEDURE — 86803 HEPATITIS C AB TEST: CPT

## 2024-03-02 PROCEDURE — 83880 ASSAY OF NATRIURETIC PEPTIDE: CPT

## 2024-03-02 PROCEDURE — 82728 ASSAY OF FERRITIN: CPT

## 2024-03-02 PROCEDURE — 84439 ASSAY OF FREE THYROXINE: CPT

## 2024-03-02 PROCEDURE — 82962 GLUCOSE BLOOD TEST: CPT

## 2024-03-02 PROCEDURE — 93005 ELECTROCARDIOGRAM TRACING: CPT

## 2024-03-02 PROCEDURE — 84436 ASSAY OF TOTAL THYROXINE: CPT

## 2024-03-02 PROCEDURE — P9016: CPT

## 2024-03-02 PROCEDURE — 36430 TRANSFUSION BLD/BLD COMPNT: CPT

## 2024-03-02 PROCEDURE — 84443 ASSAY THYROID STIM HORMONE: CPT

## 2024-03-02 PROCEDURE — 93356 MYOCRD STRAIN IMG SPCKL TRCK: CPT

## 2024-03-02 PROCEDURE — 86923 COMPATIBILITY TEST ELECTRIC: CPT

## 2024-03-02 PROCEDURE — 96374 THER/PROPH/DIAG INJ IV PUSH: CPT

## 2024-03-02 PROCEDURE — 83550 IRON BINDING TEST: CPT

## 2024-03-02 PROCEDURE — 83735 ASSAY OF MAGNESIUM: CPT

## 2024-03-02 PROCEDURE — 94760 N-INVAS EAR/PLS OXIMETRY 1: CPT

## 2024-03-02 PROCEDURE — 36415 COLL VENOUS BLD VENIPUNCTURE: CPT

## 2024-03-02 PROCEDURE — 71045 X-RAY EXAM CHEST 1 VIEW: CPT

## 2024-03-02 PROCEDURE — 83540 ASSAY OF IRON: CPT

## 2024-03-02 PROCEDURE — 84480 ASSAY TRIIODOTHYRONINE (T3): CPT

## 2024-03-02 PROCEDURE — 81001 URINALYSIS AUTO W/SCOPE: CPT

## 2024-03-02 PROCEDURE — 86900 BLOOD TYPING SEROLOGIC ABO: CPT

## 2024-03-02 PROCEDURE — 80048 BASIC METABOLIC PNL TOTAL CA: CPT

## 2024-03-02 PROCEDURE — 84100 ASSAY OF PHOSPHORUS: CPT

## 2024-03-02 PROCEDURE — 87040 BLOOD CULTURE FOR BACTERIA: CPT

## 2024-03-02 PROCEDURE — 83605 ASSAY OF LACTIC ACID: CPT

## 2024-03-02 PROCEDURE — 80053 COMPREHEN METABOLIC PANEL: CPT

## 2024-03-02 PROCEDURE — 85610 PROTHROMBIN TIME: CPT

## 2024-03-02 PROCEDURE — 85027 COMPLETE CBC AUTOMATED: CPT

## 2024-03-02 PROCEDURE — 86850 RBC ANTIBODY SCREEN: CPT

## 2024-03-02 PROCEDURE — 85014 HEMATOCRIT: CPT

## 2024-03-02 PROCEDURE — 99285 EMERGENCY DEPT VISIT HI MDM: CPT | Mod: 25

## 2024-03-02 PROCEDURE — 73030 X-RAY EXAM OF SHOULDER: CPT

## 2024-03-02 PROCEDURE — 99239 HOSP IP/OBS DSCHRG MGMT >30: CPT

## 2024-03-02 PROCEDURE — 94660 CPAP INITIATION&MGMT: CPT

## 2024-03-02 RX ORDER — ACETAMINOPHEN 500 MG
3 TABLET ORAL
Qty: 0 | Refills: 0 | DISCHARGE
Start: 2024-03-02

## 2024-03-02 RX ORDER — AMIODARONE HYDROCHLORIDE 400 MG/1
1 TABLET ORAL
Refills: 0 | DISCHARGE

## 2024-03-02 RX ORDER — RIVAROXABAN 15 MG-20MG
1 KIT ORAL
Qty: 30 | Refills: 0
Start: 2024-03-02 | End: 2024-03-31

## 2024-03-02 RX ORDER — PANTOPRAZOLE SODIUM 20 MG/1
1 TABLET, DELAYED RELEASE ORAL
Qty: 30 | Refills: 0
Start: 2024-03-02 | End: 2024-03-31

## 2024-03-02 RX ORDER — DILTIAZEM HCL 120 MG
1 CAPSULE, EXT RELEASE 24 HR ORAL
Refills: 0 | DISCHARGE

## 2024-03-02 RX ORDER — FERROUS SULFATE 325(65) MG
1 TABLET ORAL
Qty: 30 | Refills: 0
Start: 2024-03-02 | End: 2024-03-31

## 2024-03-02 RX ADMIN — PANTOPRAZOLE SODIUM 40 MILLIGRAM(S): 20 TABLET, DELAYED RELEASE ORAL at 06:12

## 2024-03-02 RX ADMIN — MUPIROCIN 1 APPLICATION(S): 20 OINTMENT TOPICAL at 06:12

## 2024-03-02 RX ADMIN — NYSTATIN CREAM 1 APPLICATION(S): 100000 CREAM TOPICAL at 06:12

## 2024-03-02 RX ADMIN — Medication 5 MILLIGRAM(S): at 06:12

## 2024-03-02 RX ADMIN — ARIPIPRAZOLE 30 MILLIGRAM(S): 15 TABLET ORAL at 13:18

## 2024-03-02 NOTE — OCCUPATIONAL THERAPY INITIAL EVALUATION ADULT - RANGE OF MOTION EXAMINATION, UPPER EXTREMITY
LUE grossly WFL all joints, shoulder approximately  degrees before pt noted to compensate with hiking/trunk. RUE shoulder limited 2*injury approximately 20 degrees flexion AROM, elbow 1/2 rage limited by swelling, grasp grossly WFL

## 2024-03-02 NOTE — OCCUPATIONAL THERAPY INITIAL EVALUATION ADULT - GENERAL OBSERVATIONS, REHAB EVAL
Left pt as received EOB, NAD, 2LO2NC (ok per RN to remove for session as pt not on home O2), +IV lock, +Tele/ A&Ox4 no c/o pain. Safety/precautions maintained. Left pt with CBWR, on RA satting 96%. Pt agreeable to OT evaluation

## 2024-03-02 NOTE — PHYSICAL THERAPY INITIAL EVALUATION ADULT - GENERAL OBSERVATIONS, REHAB EVAL
Patient received seated at EOB, NAD, breathing RA, +tele/. Pt agreeable to Physical Therapy evaluation.

## 2024-03-02 NOTE — OCCUPATIONAL THERAPY INITIAL EVALUATION ADULT - PLANNED THERAPY INTERVENTIONS, OT EVAL
ADL retraining/IADL retraining/fine motor coordination training/motor coordination training/neuromuscular re-education/ROM/strengthening

## 2024-03-02 NOTE — OCCUPATIONAL THERAPY INITIAL EVALUATION ADULT - ADDITIONAL COMMENTS
Pt has a shower with doors and grab bars. Pt independent PTA with use of quad cane and additional time for all ADL tasks. Pt owns RW, rollator, quad cane. Pt is R handed and drives. Pt is not on home O2.

## 2024-03-02 NOTE — PHYSICAL THERAPY INITIAL EVALUATION ADULT - PERTINENT HX OF CURRENT PROBLEM, REHAB EVAL
78 yr old female with hypertension, hyperlipidemia, diabetes mellitus, COPD, atrial fibrillation on Xarelto, KAJAL on CPAP, schizophrenia presented with complaints of right arm pain and swelling for few weeks after hurting herself at home, ? fall. Also noted to be on Motrin intermittently for pain. Labs revealed Hb 7.6,

## 2024-03-02 NOTE — PHYSICAL THERAPY INITIAL EVALUATION ADULT - AMBULATION SKILLS, REHAB EVAL
Moderately severe COPD based on PFTs   No evidence of acute exacerbation   Would recommend discharging on Spiriva 18 mcg once daily and Albuterol PRN   Pulmonary follow up as outpatient for further management  Please recall Pulmonary for any question or concerns  Rest of care as per primary and cardiology  needs device

## 2024-03-02 NOTE — OCCUPATIONAL THERAPY INITIAL EVALUATION ADULT - DIAGNOSIS, OT EVAL
78 year old Female presented with complaints of right arm pain and swelling for few weeks after hurting herself at home, ? fall. Pt was noted to have bradycardia and shoulder imaging revealed Medial right shoulder dislocation.

## 2024-03-02 NOTE — OCCUPATIONAL THERAPY INITIAL EVALUATION ADULT - LIVES WITH, PROFILE
Pt reports lives with daughter 1 ANNALEE, all needs on ground level. Daughter works from home and is available to assist 24/7 with needs./children

## 2024-03-02 NOTE — PHYSICAL THERAPY INITIAL EVALUATION ADULT - ADDITIONAL COMMENTS
Patient lives in a house with her daughter who works from home and is able to assist. She was independent prior to admission using a cane or rollator for ambulation.

## 2024-03-04 ENCOUNTER — TRANSCRIPTION ENCOUNTER (OUTPATIENT)
Age: 79
End: 2024-03-04

## 2024-03-05 ENCOUNTER — TRANSCRIPTION ENCOUNTER (OUTPATIENT)
Age: 79
End: 2024-03-05

## 2024-03-13 ENCOUNTER — TRANSCRIPTION ENCOUNTER (OUTPATIENT)
Age: 79
End: 2024-03-13

## 2024-03-20 ENCOUNTER — TRANSCRIPTION ENCOUNTER (OUTPATIENT)
Age: 79
End: 2024-03-20

## 2024-03-20 ENCOUNTER — APPOINTMENT (OUTPATIENT)
Dept: ORTHOPEDIC SURGERY | Facility: CLINIC | Age: 79
End: 2024-03-20
Payer: MEDICARE

## 2024-03-20 DIAGNOSIS — S43.004A UNSPECIFIED DISLOCATION OF RIGHT SHOULDER JOINT, INITIAL ENCOUNTER: ICD-10-CM

## 2024-03-20 PROCEDURE — 73030 X-RAY EXAM OF SHOULDER: CPT | Mod: RT

## 2024-03-20 PROCEDURE — 99214 OFFICE O/P EST MOD 30 MIN: CPT

## 2024-03-20 NOTE — PHYSICAL EXAM
[Degenerative change] : Degenerative change [Left] : left elbow [NL (0)] : extension 0 degrees [4 ___] : forward flexion 4[unfilled]/5 [4___] : abduction 4[unfilled]/5 [Right] : right shoulder [FreeTextEntry9] : mostly locked in internal rotation. [FreeTextEntry1] : shoulder dislocated [] : no swelling [TWNoteComboBox7] : flexion 130 degrees [TWNoteComboBox6] : pronation 80 degrees [de-identified] : supination 80 degrees

## 2024-03-20 NOTE — HISTORY OF PRESENT ILLNESS
[7] : 7 [0] : 0 [Sharp] : sharp [Shooting] : shooting [Rest] : rest [Meds] : meds [] : yes [de-identified] : 1/31/24:  bilateral elbow and shoulder pain after fall.   3/20/24: Here for f/up bilateral shoulders and elbows. She had dislocated her shoulder last month, unsure of etiology. She went to [facility] ER. Had attempted to relocate the shoulder twice in the ER. Was advised against reduction under anesthesia and surgery due to other medical issues.  [FreeTextEntry1] : BL shoulder/ elbows  [FreeTextEntry3] : 2 months ago [de-identified] : activity, putting seatbelt on  [FreeTextEntry5] : Pt had fallen  [de-identified] : PCP- RX diclofenac  [de-identified] : XR ag[facility]  [de-identified] : Dislocated  right shoulder on 2/29/24 went to hospital. Pt did not start PT yet.

## 2024-03-20 NOTE — ASSESSMENT
[FreeTextEntry1] : right shoulder pain increased since last visit and was admitted for shoulder dislocation with unknown source.  patient and daughter deferred surgery at hospital.  per patient, she is too risky for surgery.  had long discussion with patient and daughter regarding chronic shoulder dislocation.  some risk for nv injury. the longer is it out, the harder to fix long term. she has current dislocation and with what looks like large hill sachs lesion. she does not have much shoulder pain.  she can get her hand to her mouth.  left shoulder pain.  concern for cuff tear.  oa present and some inferior subluxation but in place. improved motion. left elbow pain.  oa present.  no acute fracture seen.  some weakness with extesion but ttp lateral epicondyle triceps feels intact. improved motion. right elbow pain.  oas present.  possible sprain. strain. no acute fracture seen.  improved motion.  diabetes. emphysema. copd.

## 2024-03-20 NOTE — DISCUSSION/SUMMARY
[de-identified] : patient and daughter defer surgical consult.  they understand risks. PT for left shoulder and bilateral elbows.  patient does not drive and request home PT.. follow up 6 weeks.

## 2024-03-22 DIAGNOSIS — M25.521 PAIN IN RIGHT ELBOW: ICD-10-CM

## 2024-12-20 ENCOUNTER — APPOINTMENT (OUTPATIENT)
Dept: PULMONOLOGY | Facility: CLINIC | Age: 79
End: 2024-12-20

## 2025-04-09 ENCOUNTER — APPOINTMENT (OUTPATIENT)
Dept: PULMONOLOGY | Facility: CLINIC | Age: 80
End: 2025-04-09
Payer: MEDICARE

## 2025-04-09 VITALS
OXYGEN SATURATION: 97 % | SYSTOLIC BLOOD PRESSURE: 120 MMHG | BODY MASS INDEX: 36.8 KG/M2 | WEIGHT: 200 LBS | RESPIRATION RATE: 16 BRPM | DIASTOLIC BLOOD PRESSURE: 79 MMHG | HEIGHT: 62 IN | HEART RATE: 68 BPM

## 2025-04-09 DIAGNOSIS — R91.1 SOLITARY PULMONARY NODULE: ICD-10-CM

## 2025-04-09 DIAGNOSIS — J44.9 CHRONIC OBSTRUCTIVE PULMONARY DISEASE, UNSPECIFIED: ICD-10-CM

## 2025-04-09 DIAGNOSIS — G47.33 OBSTRUCTIVE SLEEP APNEA (ADULT) (PEDIATRIC): ICD-10-CM

## 2025-04-09 DIAGNOSIS — R93.89 ABNORMAL FINDINGS ON DIAGNOSTIC IMAGING OF OTHER SPECIFIED BODY STRUCTURES: ICD-10-CM

## 2025-04-09 PROCEDURE — 99214 OFFICE O/P EST MOD 30 MIN: CPT

## 2025-04-09 PROCEDURE — G2211 COMPLEX E/M VISIT ADD ON: CPT

## 2025-04-09 RX ORDER — UMECLIDINIUM BROMIDE AND VILANTEROL TRIFENATATE 62.5; 25 UG/1; UG/1
62.5-25 POWDER RESPIRATORY (INHALATION)
Qty: 3 | Refills: 3 | Status: ACTIVE | COMMUNITY
Start: 2025-04-09 | End: 1900-01-01

## 2025-04-09 RX ORDER — UMECLIDINIUM BROMIDE AND VILANTEROL TRIFENATATE 62.5; 25 UG/1; UG/1
POWDER RESPIRATORY (INHALATION)
Refills: 0 | Status: ACTIVE | COMMUNITY

## 2025-04-22 ENCOUNTER — APPOINTMENT (OUTPATIENT)
Dept: CT IMAGING | Facility: CLINIC | Age: 80
End: 2025-04-22

## 2025-07-01 ENCOUNTER — APPOINTMENT (OUTPATIENT)
Dept: PULMONOLOGY | Facility: CLINIC | Age: 80
End: 2025-07-01

## 2025-07-09 ENCOUNTER — APPOINTMENT (OUTPATIENT)
Dept: PULMONOLOGY | Facility: CLINIC | Age: 80
End: 2025-07-09

## 2025-07-23 ENCOUNTER — APPOINTMENT (OUTPATIENT)
Dept: CT IMAGING | Facility: CLINIC | Age: 80
End: 2025-07-23
Payer: MEDICARE

## 2025-07-23 ENCOUNTER — OUTPATIENT (OUTPATIENT)
Dept: OUTPATIENT SERVICES | Facility: HOSPITAL | Age: 80
LOS: 1 days | End: 2025-07-23
Payer: MEDICARE

## 2025-07-23 DIAGNOSIS — Z90.710 ACQUIRED ABSENCE OF BOTH CERVIX AND UTERUS: Chronic | ICD-10-CM

## 2025-07-23 DIAGNOSIS — R91.1 SOLITARY PULMONARY NODULE: ICD-10-CM

## 2025-07-23 DIAGNOSIS — Z98.890 OTHER SPECIFIED POSTPROCEDURAL STATES: Chronic | ICD-10-CM

## 2025-07-23 PROCEDURE — 71250 CT THORAX DX C-: CPT | Mod: 26

## 2025-07-23 PROCEDURE — 71250 CT THORAX DX C-: CPT

## 2025-07-29 ENCOUNTER — NON-APPOINTMENT (OUTPATIENT)
Age: 80
End: 2025-07-29

## (undated) DEVICE — VENODYNE/SCD SLEEVE CALF MEDIUM

## (undated) DEVICE — SOL IRR BAG H2O 1000ML

## (undated) DEVICE — SOL BAG NS 0.9% 1000ML

## (undated) DEVICE — SENSOR O2 FINGER ADULT

## (undated) DEVICE — KIT DEFENDO 4 OLY 4 PC

## (undated) DEVICE — DRSG CURITY GAUZE SPONGE 4 X 4" 12-PLY NON-STERILE

## (undated) DEVICE — CATH IV SAFE BC 22G X 1" (BLUE)

## (undated) DEVICE — SOL IRR BAG NS 0.9% 1000ML

## (undated) DEVICE — DENTURE CUP PINK

## (undated) DEVICE — TUBING IV EXTENSION MACRO W CLAVE 7"

## (undated) DEVICE — UNDERPAD LINEN SAVER 23 X 36"

## (undated) DEVICE — FORCEP RADIAL JAW 4 W NDL 2.4MM 2.8MM 240CM ORANGE DISP

## (undated) DEVICE — WARMING BLANKET FULL ADULT

## (undated) DEVICE — DRSG 2X2

## (undated) DEVICE — GOWN IMPERV XL

## (undated) DEVICE — SYR LUER SLIP TIP 50CC

## (undated) DEVICE — MASK PROCED EARLOOP 50/BX LRC COVID ADD

## (undated) DEVICE — TUBING ALARIS PUMP MODULE NON-DEHP

## (undated) DEVICE — PACK IV START WITH CHG

## (undated) DEVICE — SYR IV FLUSH SALINE 10ML 30/TY

## (undated) DEVICE — BITE BLOCK ADULT 20 X 27MM (GREEN)

## (undated) DEVICE — SYR SLIP 10CC